# Patient Record
Sex: FEMALE | Race: WHITE | NOT HISPANIC OR LATINO | Employment: FULL TIME | URBAN - METROPOLITAN AREA
[De-identification: names, ages, dates, MRNs, and addresses within clinical notes are randomized per-mention and may not be internally consistent; named-entity substitution may affect disease eponyms.]

---
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
## 2018-01-10 NOTE — PROGRESS NOTES
Chief Complaint    1  Hand Problem    Post-Op  Post-Op Hand Finger [facility]:   Ana Rodriguez is status post Ganglion Cyst Excision of the left ring finger  History of Present Illness: The patient reports no nausea, no fever, no pain, no abnormal bleeding, no swelling, no numbness and no paresthesias  Physical Examination:   Surgical incision site is clean, dry and intact  The hand demonstrates no warmth, no erythema, no swelling, no induration, no ecchymosis and no tenderness  Evaluation of sensation distal to the operative site demonstrates sensation intact to light touch and motor function grossly intact  Capillary refill distal to the operative site is within normal limits (approx  2 seconds)  Assessment:   Post-op, the patient is doing well, with good pain control and without signs of an infection  Plan:     Done this visit: remove sutures/staples and steristrip wound  Medication(s): no medications were prescribed  Patient instructed to follow up if needed  Active Problems    1  Ankle Sprain (845 00)   2  Mass of hand, left (782 2) (R22 32)   3  Shoulder Sprain (840 9)   4  Tenosynovitis Of Ankle/Foot (727 06)   5  Wrist tendonitis (727 05) (M77 8)    Social History    · Daily Tea Consumption (___ Cups/Day)   · Never A Smoker   · Never Drank Alcohol    Current Meds   1  No Reported Medications Recorded    Allergies    1  No Known Drug Allergies    Future Appointments    Date/Time Provider Specialty Site   01/18/2016 08:40 AM SIOBHAN Castelan   Orthopedic [address] [facility]     Signatures   Electronically signed by : SIOBHAN Mathis ; Vijay 15 2016  6:48PM EST                       (Author)

## 2018-01-11 NOTE — PROGRESS NOTES
Assessment    1  Encounter for preventive health examination (V70 0) (Z00 00)   2  Encounter for PPD test (V74 1) (Z11 1)    Discussion/Summary    Urine screen done  rto prn  Chief Complaint  Annual PE w/ARC forms  ksd,cma      History of Present Illness  HM, 12-18 years Female (Brief): Ron Krishna presents today for routine health maintenance with her ARC PE  General Health:   Caregiver concerns:   Nutrition/Elimination:   Sleep:   Behavior:   Health Risks:   Childcare/School:   Sports Participation Questions:      Review of Systems    Constitutional: No complaints of fever or chills, feels well, no tiredness, no recent weight gain or loss  Eyes: No complaints of eye pain, no discharge, no eyesight problems, eyes do not itch, no red or dry eyes  ENT: no complaints of nasal discharge, no hoarseness, no earache, no nosebleeds, no loss of hearing, no sore throat  Cardiovascular: No complaints of chest pain, no palpitations, normal heart rate, no lower extremity edema  Respiratory: No complaints of cough, no shortness of breath, no wheezing, no leg claudication  Gastrointestinal: No complaints of abdominal pain, no nausea or vomiting, no constipation, no diarrhea or bloody stools  Genitourinary: No complaints of incontinence, no pelvic pain, no dysuria or dysmenorrhea, no abnormal vaginal bleeding or vaginal discharge  Musculoskeletal: No complaints of limb swelling or limb pain, no myalgias, no joint swelling or joint stiffness  Integumentary: No complaints of skin rash, no skin lesions or wounds, no itching, no breast pain, no breast lump  Neurological: No complaints of headache, no numbness or tingling, no confusion, no dizziness, no limb weakness, no convulsions or fainting, no difficulty walking  Psychiatric: No complaints of feeling depressed, no suicidal thoughts, no emotional problems, no anxiety, no sleep disturbances, no change in personality     Endocrine: No complaints of feeling weak, no muscle weakness, no deepening of voice, no hot flashes or proptosis  Hematologic/Lymphatic: No complaints of swollen glands, no neck swollen glands, does not bleed or bruise easily  ROS reported by the patient  Family History  Mother    · Family history of lupus anticoagulant disorder (V18 3) (Z83 2)  Father    · Family history of essential hypertension (V17 49) (Z82 49)  Paternal Grandmother    · Family history of Type 2 diabetes mellitus without complication, unspecified long term  insulin use status  Maternal Grandfather    · Family history of Type 2 diabetes mellitus without complication, unspecified long term  insulin use status  Family History    · Family history of Arthritis (V17 7)    Social History    · Daily Tea Consumption (___ Cups/Day)   · Never A Smoker   · Never Drank Alcohol    Current Meds   1  No Reported Medications Recorded    Allergies    1  No Known Drug Allergies    Vitals   Recorded: 55LYL2024 01:51LI   Systolic 606   Diastolic 80   Heart Rate 88   Respiration 16   Temperature 97 6 F   Height 5 ft 9 in   Weight 278 lb    BMI Calculated 41 05   BSA Calculated 2 38   BMI Percentile 99 %   2-20 Stature Percentile 97 %   2-20 Weight Percentile 99 %     Physical Exam    Constitutional - General appearance: No acute distress, well appearing and well nourished  morbidly obese  Head and Face - Head and face: Normocephalic, atraumatic  Eyes - Conjunctiva and lids: No injection, edema or discharge  Pupils and irises: Equal, round, reactive to light bilaterally  Ears, Nose, Mouth, and Throat - Otoscopic examination: Tympanic membranes gray, translucent with good bony landmarks and light reflex  Canals patent without erythema  Oropharynx: Moist mucosa, normal tongue and tonsils without lesions  Neck - Neck: Supple, symmetric, no masses  Pulmonary - Respiratory effort: Normal respiratory rate and rhythm, no increased work of breathing   Auscultation of lungs: Clear bilaterally  Cardiovascular - Auscultation of heart: Regular rate and rhythm, normal S1 and S2, no murmur  Examination of extremities for edema and/or varicosities: Normal    Abdomen - Abdomen: Normal bowel sounds, soft, non-tender, no masses  Musculoskeletal - Gait and station: Normal gait   Inspection/palpation of joints, bones, and muscles: Normal  Muscle strength/tone: Normal    Skin - Skin and subcutaneous tissue: Normal    Neurologic - Cranial nerves: Normal    Psychiatric - judgment and insight: Normal  Mood and affect: Normal       Signatures   Electronically signed by : SIOBHAN Winslow ; Oct 12 2016  2:23PM EST                       (Author)

## 2018-01-12 NOTE — PROGRESS NOTES
Assessment    1  Difficulty walking (719 7) (R26 2)   2  Plantar fibromatosis (728 71) (M72 2)   3  Congenital pes planus of left foot (754 61) (Q66 52)   4  Congenital pes planus of right foot (754 61) (Q66 51)    Plan    · Follow-up PRN Evaluation and Treatment  Follow-up  Status: Complete  Done:  84IVF5761 03:52PM   · We recommend that you stretch your plantar fascia using a step or stair  Do this exercise  10 times in a row, 3 times a day , and hold the stretch for 10 seconds each  time ; Status:Complete;   Done: 93ICN1267 03:52PM   · Orthotics B/L; Status:Complete;   Done: 39BQP6002 03:52PM    Discussion/Summary  Possible side effects of new medications were reviewed with the patient/guardian today  The treatment plan was reviewed with the patient/guardian  The patient/guardian understands and agrees with the treatment plan   The patient was counseled regarding instructions for management, patient and family education, importance of compliance with treatment  Chief Complaint  Feet and ankles have been painful  History of Present Illness  HPI: Asian complains of pain in her feet and arches with ambulation  Patient has no history of trauma      Review of Systems    Constitutional: No fever, no chills, feels well, no tiredness, no recent weight gain or loss  Eyes: No complaints of eyesight problems, no red eyes  ENT: no loss of hearing, no nosebleeds, no sore throat  Cardiovascular: No complaints of chest pain, no palpitations, no leg claudication or lower extremity edema  Respiratory: no compliants of shortness of breath, no wheezing, no cough  Gastrointestinal: no complaints of abdominal pain, no constipation, no nausea or diarrhea, no vomiting, no bloody stools  Genitourinary: no complaints of dysuria, no incontinence  Musculoskeletal: as noted in HPI  Integumentary: no complaints of skin rash or lesion, no itching or dry skin, no skin wounds     Neurological: no complaints of headache, no confusion, no numbness or tingling, no dizziness  Endocrine: No complaints of muscle weakness, no feelings of weakness, no frequent urination, no excessive thirst    Psychiatric: No suicidal thoughts, no anxiety, no feelings of depression  Active Problems    1  Ankle Sprain (845 00)   2  Mass of hand, left (782 2) (R22 32)   3  Shoulder Sprain (840 9)   4  Tenosynovitis Of Ankle/Foot (727 06)   5  Wrist tendonitis (727 05) (M77 8)    Past Medical History    The active problems and past medical history were reviewed and updated today  Surgical History    The surgical history was reviewed and updated today  Family History    · Family history of Arthritis (V17 7)    The family history was reviewed and updated today  Social History    · Daily Tea Consumption (___ Cups/Day)   · Never A Smoker   · Never Drank Alcohol  The social history was reviewed and updated today  Current Meds   1  No Reported Medications Recorded    The medication list was reviewed and updated today  Allergies    1  No Known Drug Allergies    Vitals   Recorded: 35XPP1692 03:20PM   Height 5 ft 10 in   2-20 Stature Percentile 99 %   Weight 232 lb    2-20 Weight Percentile 99 %   BMI Calculated 33 29   BMI Percentile 97 %   BSA Calculated 2 22     Physical Exam  Left Foot: Appearance: Normal except as noted: excessive pronation and pes planus  Tenderness: None except the medial longitudinal arch  Right Foot: Appearance: Normal except as noted: excessive pronation and pes planus  Tenderness: None except the medial longitudinal arch  Left Ankle: ROM: Full  Motor: Normal    Right Ankle: ROM: Full  Motor: Normal    Neurological Exam: performed  Light touch was intact bilaterally  Vibratory sensation was intact bilaterally  Response to monofilament test was intact bilaterally  Deep tendon reflexes: patellar reflex present bilaterally and achilles reflex present bilaterally     Vascular Exam: performed Dorsalis pedis pulses were present bilaterally  Posterior tibial pulses were present bilaterally  Elevation Pallor: absent bilaterally  Dependence rubor was absent bilaterally  Edema: none  Hyperkeratosis: present on both first toes  Shoe Gear Evaluation: performed ()  Recommendation(s): custom inlays and athletic shoes  Results/Data  Apparent limb length discrepancy noted  The right lower extremity is longer than the left  Patient will need an orthotic with a left heel lift      Procedure  Patient was examined  Patient was educated on her condition   Her feet were casted for custom molded foot orthotics      Signatures   Electronically signed by : Janel Pearson DPM; Jan 28 2016  3:52PM EST                       (Author)

## 2018-01-13 VITALS
SYSTOLIC BLOOD PRESSURE: 140 MMHG | HEIGHT: 69 IN | TEMPERATURE: 98.7 F | HEART RATE: 80 BPM | DIASTOLIC BLOOD PRESSURE: 80 MMHG | BODY MASS INDEX: 42.51 KG/M2 | WEIGHT: 287 LBS | RESPIRATION RATE: 16 BRPM

## 2018-01-15 NOTE — PROGRESS NOTES
Chief Complaint  pt here for ppd read  tc/cma      Active Problems    1  Ankle Sprain (845 00)   2  Body mass index (BMI) of 40 0 to 44 9 in adult (V85 41) (Z68 41)   3  Congenital pes planus of left foot (754 61) (Q66 52)   4  Congenital pes planus of right foot (754 61) (Q66 51)   5  Difficulty walking (719 7) (R26 2)   6  Elevated TSH (794 5) (R94 6)   7  Encounter for PPD test (V74 1) (Z11 1)   8  Mass of hand, left (782 2) (R22 32)   9  Obesity (278 00) (E66 9)   10  Pain in both feet (729 5) (M79 671,M79 672)   11  Plantar fibromatosis (728 71) (M72 2)   12  Shoulder Sprain (840 9)   13  Tenosynovitis Of Ankle/Foot (727 06)   14  Vitiligo (709 01) (L80)   15  Wrist tendonitis (727 05) (M77 8)    Current Meds   1  No Reported Medications Recorded    Allergies    1   No Known Drug Allergies    Signatures   Electronically signed by : SIOBHAN Goodrich ; Oct 14 2016  2:07PM EST                       (Author)

## 2018-09-28 ENCOUNTER — OFFICE VISIT (OUTPATIENT)
Dept: URGENT CARE | Facility: CLINIC | Age: 18
End: 2018-09-28
Payer: COMMERCIAL

## 2018-09-28 VITALS
HEIGHT: 70 IN | DIASTOLIC BLOOD PRESSURE: 80 MMHG | OXYGEN SATURATION: 100 % | BODY MASS INDEX: 39.94 KG/M2 | WEIGHT: 279 LBS | HEART RATE: 106 BPM | RESPIRATION RATE: 18 BRPM | SYSTOLIC BLOOD PRESSURE: 118 MMHG | TEMPERATURE: 98.4 F

## 2018-09-28 DIAGNOSIS — M54.9 ACUTE BILATERAL BACK PAIN, UNSPECIFIED BACK LOCATION: Primary | ICD-10-CM

## 2018-09-28 PROBLEM — R51.9 HEADACHE: Status: ACTIVE | Noted: 2017-01-31

## 2018-09-28 PROBLEM — S09.90XA RECENT HEAD TRAUMA: Status: ACTIVE | Noted: 2017-01-31

## 2018-09-28 PROCEDURE — 99213 OFFICE O/P EST LOW 20 MIN: CPT | Performed by: PHYSICIAN ASSISTANT

## 2018-09-28 RX ORDER — CYCLOBENZAPRINE HCL 10 MG
10 TABLET ORAL 3 TIMES DAILY PRN
Qty: 20 TABLET | Refills: 0 | Status: SHIPPED | OUTPATIENT
Start: 2018-09-28 | End: 2019-03-27 | Stop reason: SDUPTHER

## 2018-09-28 NOTE — PATIENT INSTRUCTIONS
Take muscle relaxant as directed  This medication will make you drowsy and should not be taken prior to performing activities that will require full attention  Do not take within 12 hours of driving  Begin ibuprofen as directed  Apply heat or ice to the area for 20-30 minutes at a time at least 3-4 times per day  Continue stretching at least twice daily  Follow up with an orthopedist in the next 3-5 days  Proceed to the ER if symptoms worsen

## 2018-10-01 ENCOUNTER — OFFICE VISIT (OUTPATIENT)
Dept: FAMILY MEDICINE CLINIC | Facility: CLINIC | Age: 18
End: 2018-10-01
Payer: COMMERCIAL

## 2018-10-01 VITALS
HEIGHT: 70 IN | WEIGHT: 276.2 LBS | SYSTOLIC BLOOD PRESSURE: 142 MMHG | BODY MASS INDEX: 39.54 KG/M2 | TEMPERATURE: 97.4 F | DIASTOLIC BLOOD PRESSURE: 80 MMHG | RESPIRATION RATE: 16 BRPM | HEART RATE: 100 BPM

## 2018-10-01 DIAGNOSIS — M25.552 LEFT HIP PAIN: Primary | ICD-10-CM

## 2018-10-01 DIAGNOSIS — M25.40 JOINT SWELLING: ICD-10-CM

## 2018-10-01 DIAGNOSIS — M54.50 LEFT-SIDED LOW BACK PAIN WITHOUT SCIATICA, UNSPECIFIED CHRONICITY: ICD-10-CM

## 2018-10-01 PROCEDURE — 99213 OFFICE O/P EST LOW 20 MIN: CPT | Performed by: FAMILY MEDICINE

## 2018-10-01 NOTE — PROGRESS NOTES
[address] [facility]        NAME: Kaleb Fuentes is a 19 y o  female  : 2000    MRN: [id number]  DATE: 2018  TIME: 3:30 PM    Assessment and Plan   Acute bilateral back pain, unspecified back location [M54 9]  1  Acute bilateral back pain, unspecified back location  cyclobenzaprine (FLEXERIL) 10 mg tablet     Patient Instructions   Take muscle relaxant as directed  This medication will make you drowsy and should not be taken prior to performing activities that will require full attention  Do not take within 12 hours of driving  Begin ibuprofen as directed  Apply heat or ice to the area for 20-30 minutes at a time at least 3-4 times per day  Continue stretching at least twice daily  Follow up with an orthopedist in the next 3-5 days  Proceed to the ER if symptoms worsen  Chief Complaint     Chief Complaint   Patient presents with    Back Pain     Pt here for back pain x 2 weeks pt states she has a hx of back  tightness,   pt states no injury  Pain   7/10  Pt used no meds,     History of Present Illness      64-year-old female presenting with complaint of back tightness x2 weeks  Patient notes that tightness initially 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  She notes she has had difficulty with her back becoming tight periodically over the past few years  She reportedly stretches twice daily, applies heat or sometimes sleeps on a heating pad, and has attempted to treat with ibuprofen and Tylenol  Pain is rated as a 7/10, is reportedly sharp, and is reportedly disrupting patient's sleep  She notes she has tried to use Benadryl and supplemental melatonin to aid in sleep but states these are not working  She notes primary problem is not falling asleep but rather staying asleep  She notes that movements in her sleep cause her to awaken out of discomfort and then she is not able to get back to sleep   She has reportedly seen a chiropractor in the past but is not currently following with this type of doctor  She denies any numbness, tingling, weakness, saddle anesthesia, or incontinence of bowel or bladder  No recent injury or trauma  Review of Systems   Review of Systems   Constitutional: Negative for appetite change, chills, diaphoresis, fatigue and fever  Respiratory: Negative for cough, chest tightness, shortness of breath and wheezing  Cardiovascular: Negative for chest pain and palpitations  Gastrointestinal: Negative for abdominal pain, diarrhea, nausea and vomiting  Musculoskeletal: Negative for arthralgias, gait problem, joint swelling, myalgias, neck pain and neck stiffness  Skin: Negative for color change, pallor, rash and wound  Neurological: Negative for dizziness, weakness, light-headedness, numbness and headaches  Current Medications       Current Outpatient Prescriptions:     cyclobenzaprine (FLEXERIL) 10 mg tablet, Take 1 tablet (10 mg total) by mouth 3 (three) times a day as needed for muscle spasms for up to 20 doses, Disp: 20 tablet, Rfl: 0    Current Allergies     Allergies as of 09/28/2018    (No Known Allergies)            The following portions of the patient's history were reviewed and updated as appropriate: allergies, current medications, past family history, past medical history, past social history, past surgical history and problem list      Past Medical History:   Diagnosis Date    Obesity 8/11/2016       Past Surgical History:   Procedure Laterality Date    HAND SURGERY      HAND SURGERY      WISDOM TOOTH EXTRACTION         Family History   Problem Relation Age of Onset    Hypertension Mother     Heart disease Mother     Heart disease Father     Hypertension Father      Medications have been verified      Objective   /80 (BP Location: Right arm, Patient Position: Sitting, Cuff Size: Large)   Pulse (!) 106   Temp 98 4 °F (36 9 °C) (Tympanic)   Resp 18   Ht 5' 10" (1 778 m)   Wt 127 kg (279 lb)   SpO2 100%   BMI 40 03 kg/m²      Physical Exam     Physical Exam   Constitutional: She is oriented to person, place, and time  She appears well-developed and well-nourished  No distress  HENT:   Head: Normocephalic and atraumatic  Eyes: Conjunctivae are normal    Cardiovascular: Normal rate, regular rhythm and normal heart sounds  Pulmonary/Chest: Effort normal and breath sounds normal  No respiratory distress  She has no decreased breath sounds  She has no wheezes  She has no rhonchi  She has no rales  Abdominal: Soft  Bowel sounds are normal  She exhibits no distension and no mass  There is no tenderness  There is no rebound and no guarding  Musculoskeletal:    No overlying skin abnormality or visible deformity of the back  No vertebral TTP  There is PSM TTP throughout the thoracic and lumbar areas  Tenderness is reportedly worse on the right  Increased PSM tone of b/l lumbar areas  No cervical paraspinal muscle TTP  FROM of the cervical and thoracic spine  Near complete ROM of the lumbar spine  Negative straight leg raise  UE and LE strength 5/5  Distal sensation intact  UE and LE reflexes +2, symmetrical    Neurological: She is alert and oriented to person, place, and time  No cranial nerve deficit  She exhibits normal muscle tone  Coordination normal    Skin: Skin is warm and dry  No rash noted  She is not diaphoretic  Psychiatric: She has a normal mood and affect  Her behavior is normal    Nursing note and vitals reviewed

## 2018-10-01 NOTE — PROGRESS NOTES
Charna Heimlich 2000 female MRN: [id number]    FAMILY PRACTICE ACUTE OFFICE VISIT  [facility] - [address]      ASSESSMENT/PLAN  Charna Heimlich is a 19 y o  female presents to the office for    Left hip pain  - pt at baseline has chronic pain, however this is an acute on chronic hip pain she feeling  Continue flexeril 10mg  (+) test for labral tear  Would encourage the patient to start PT; if symptoms worsen or don't improve, will obtain an MRI  -     Ambulatory referral to Physical Therapy; Future    Left-sided low back pain without sciatica, unspecified chronicity  - Didn't appreciate any limited ROM/ or bony tenderness  Patient is likely compensating 2/2 to the hip pain  -     Ambulatory referral to Physical Therapy; Future    Joint swelling:-     - Given mother that is (+) for cardiolipin; concern for underlining autoimmune disease given symptoms since 6years of age  KEELY Screen w/ Reflex to Titer/Pattern; Future  -     Sedimentation rate, automated; Future  -     Cardiolipin antibody; Future  -     Sedimentation rate, automated  -     Cardiolipin antibody        Disposition: RTC after results     No future appointments  SUBJECTIVE  CC: Follow-up (from [facility] for [facility] pain)      HPI:  Charna Heimlich is a 19 y o  female pmhx of plantar fibromatosis, obesity and chronic joint swelling who presents for a follow-up after being seen at [facility]  Patient was seen there for bilateral back pain however the patient explains that it is mostly on her left side  - Back pain x 2 weeks w/ left hip pain, feeling like twinging of the hip  Pain Sharp/ stabbing pain with sitting/to standing    - left foot numbness time to time  -patient states that she has chronic joint swelling of the left knee to the right knee at times since the age of 6years of age  She has chronic problems in always has been worked up for possible Lyme    She is an active  and currently has taken some time off given her pains have been worsening  Mother recently diagnosed with Cardiolite been positive and was placed on blood thinners  No other family history of lupus/autoimmune        Review of Systems   Constitutional: Negative for activity change, appetite change, chills, fatigue and fever  HENT: Negative for congestion  Eyes: Negative for visual disturbance  Respiratory: Negative for cough, chest tightness and shortness of breath  Cardiovascular: Negative for chest pain and leg swelling  Gastrointestinal: Negative for abdominal distention, abdominal pain, constipation, diarrhea, nausea and vomiting  Musculoskeletal: Positive for arthralgias, back pain, gait problem and joint swelling  Allergic/Immunologic: Negative for environmental allergies  Neurological: Negative for dizziness, light-headedness and headaches  All other systems reviewed and are negative        Historical Information   The patient history was reviewed as follows:  Past Medical History:   Diagnosis Date    Obesity 8/11/2016         Past Surgical History:   Procedure Laterality Date    HAND SURGERY      HAND SURGERY      WISDOM TOOTH EXTRACTION       Family History   Problem Relation Age of Onset    Hypertension Mother     Heart disease Mother     Heart disease Father     Hypertension Father       Social History   History   Alcohol Use No     History   Drug Use No     History   Smoking Status    Never Smoker   Smokeless Tobacco    Never Used       Medications:     Current Outpatient Prescriptions:     cyclobenzaprine (FLEXERIL) 10 mg tablet, Take 1 tablet (10 mg total) by mouth 3 (three) times a day as needed for muscle spasms for up to 20 doses, Disp: 20 tablet, Rfl: 0    No Known Allergies    OBJECTIVE  Vitals:   Vitals:    10/01/18 1017   BP: 142/80   BP Location: Left arm   Patient Position: Sitting   Cuff Size: Standard   Pulse: 100   Resp: 16   Temp: (!) 97 4 °F (36 3 °C) Weight: 125 kg (276 lb 3 2 oz)   Height: 5' 10" (1 778 m)         Physical Exam   Constitutional: She is oriented to person, place, and time  Vital signs are normal  She appears well-developed and well-nourished  HENT:   Head: Normocephalic and atraumatic  Right Ear: Hearing normal    Left Ear: Hearing normal    Eyes: Pupils are equal, round, and reactive to light  Conjunctivae and EOM are normal    Neck: Normal range of motion  Neck supple  Cardiovascular: Normal rate, regular rhythm, S1 normal, S2 normal, normal heart sounds and intact distal pulses  No murmur heard  Pulmonary/Chest: Effort normal and breath sounds normal  No respiratory distress  She has no wheezes  Musculoskeletal: She exhibits no edema  Left hip: She exhibits decreased range of motion and tenderness (In the groin area on adduction)  She exhibits no swelling  Lumbar back: Normal  She exhibits normal range of motion, no tenderness, no bony tenderness, no swelling, no edema, no pain and no spasm  Bilateral knees no swelling appreciated good range of motion  Neurological: She is alert and oriented to person, place, and time  She has normal strength  Skin: Skin is warm  No rash noted  Psychiatric: She has a normal mood and affect  Her speech is normal and behavior is normal  Judgment and thought content normal    Vitals reviewed                   Chandrakant Whitten MD,   [facility]  10/1/2018

## 2018-10-02 ENCOUNTER — EVALUATION (OUTPATIENT)
Dept: PHYSICAL THERAPY | Facility: CLINIC | Age: 18
End: 2018-10-02
Payer: COMMERCIAL

## 2018-10-02 DIAGNOSIS — M54.50 LEFT-SIDED LOW BACK PAIN WITHOUT SCIATICA, UNSPECIFIED CHRONICITY: ICD-10-CM

## 2018-10-02 DIAGNOSIS — M25.552 LEFT HIP PAIN: Primary | ICD-10-CM

## 2018-10-02 LAB
CARDIOLIPIN IGA SER IA-ACNC: <9 APL U/ML (ref 0–11)
CARDIOLIPIN IGG SER IA-ACNC: <9 GPL U/ML (ref 0–14)
CARDIOLIPIN IGM SER IA-ACNC: <9 MPL U/ML (ref 0–12)
ERYTHROCYTE [SEDIMENTATION RATE] IN BLOOD BY WESTERGREN METHOD: 16 MM/HR (ref 0–32)

## 2018-10-02 PROCEDURE — G8979 MOBILITY GOAL STATUS: HCPCS

## 2018-10-02 PROCEDURE — G8978 MOBILITY CURRENT STATUS: HCPCS

## 2018-10-02 PROCEDURE — 97161 PT EVAL LOW COMPLEX 20 MIN: CPT

## 2018-10-02 NOTE — PROGRESS NOTES
PT Evaluation     Today's date: 10/2/2018  Patient name: Tyrone Feldman  : 2000  MRN: [id number]  Referring provider: Shelby Pierce  Dx:   Encounter Diagnosis     ICD-10-CM    1  Left hip pain M25 552 Ambulatory referral to Physical Therapy   2  Left-sided low back pain without sciatica, unspecified chronicity M54 5 Ambulatory referral to Physical Therapy                  Assessment  Impairments: abnormal gait, abnormal or restricted ROM, abnormal movement, activity intolerance, impaired balance, impaired physical strength, lacks appropriate home exercise program, pain with function and poor body mechanics    Assessment details: Tyrone Feldman is a 19 y o  female who presents to physical therapy with pain, decreased LE range of motion, decreased LE strength, fair balance, impaired function and decreased activity tolerance  Patient's clinical presentation is consistent with their referring diagnosis of Left hip pain  (primary encounter diagnosis), and Left-sided low back pain without sciatica, unspecified chronicity  The pt presents with functional limitations of ADLs, recreational activities and work-related activities  Pt would benefit from physical therapy services to address these limitations and maximize function  Pt was instructed and educated on home exercise program and good sitting posture today and demonstrates understanding     Understanding of Dx/Px/POC: good   Prognosis: good    Goals  Short Term Goals (4 weeks)  Pt will be independent in basic Home Exercise Program  Pt will demonstrate good hip hinge and squat form  Pt will demonstrate improved left hip ROM by 10 degrees in all directions  Pt will be able to perform ADLs with pain no more than 3/10    Long Term Goals (6-8 weeks)  Pt will be independent in comprehensive Home Exercise Program  Pt will demonstrate improved FOTO status score by 10 points  Pt will demonstrate improved hip MMT strength to at least 4+/5  Pt will be able to return to gym routine with minimal to no limitations    Plan  Patient would benefit from: skilled PT  Referral necessary: No  Planned modality interventions: cryotherapy and thermotherapy: hydrocollator packs  Planned therapy interventions: ADL training, activity modification, balance, body mechanics training, flexibility, functional ROM exercises, gait training, graded exercise, home exercise program, transfer training, therapeutic exercise, therapeutic activities, stretching, strengthening, postural training, patient education, neuromuscular re-education, manual therapy and joint mobilization  Frequency: 2x week  Duration in weeks: 8  Treatment plan discussed with: patient        Subjective Evaluation    History of Present Illness  Mechanism of injury: Pt reports that she has been suffering from left hip and left sided LBP for the past couple of weeks  States she had an acute flare up in her low back last week and had to go to urgent care  Pt was given a script for Flexeril and instructed to follow up with PCP  She saw her PCP yesterday and was referred to PT  No imaging taken  Pt reports her pain will increase with prolonged sitting, getting up after prolonged rest, standing for several hours, lifting heavy objects, and is waking up several times a night with pain  Pt reports that her back will twinge occasionally and feels like a muscle spasm  Occasionally radiates to lateral hip and down to knee  States that her left foot can go numb after 30 min on a cardio machine     Not a recurrent problem   Pain  Current pain ratin  At best pain ratin  At worst pain ratin  Location: left hip  Quality: sharp, radiating, tight, dull ache and discomfort  Relieving factors: medications, ice and heat  Aggravating factors: standing, walking, sitting and lifting  Progression: no change    Social Support    Employment status: working (Full time student- internship social work major, and part time at [employer])  Exercise history: everyday, decreased to 1-2 times      Diagnostic Tests  No diagnostic tests performed  Treatments  Previous treatment: medication  Patient Goals  Patient goals for therapy: decreased pain and return to sport/leisure activities          Objective     Special Questions  Negative for bladder dysfunction and bowel dysfunction    Palpation   Left   No palpable tenderness to the iliopsoas  Hypertonic in the quadratus lumborum  Tenderness of the gluteus medius, lumbar paraspinals, quadratus lumborum and TFL  Right   No palpable tenderness to the iliopsoas  Tenderness     Lumbar Spine  No tenderness in the spinous process  Left Hip   Tenderness in the PSIS  Right Hip   No tenderness in the PSIS  Neurological Testing     Sensation     Lumbar   Left   Intact: light touch    Right   Intact: light touch    Active Range of Motion     Lumbar   Flexion: 90 (%) degrees   Extension: 100 (%) degrees   Left lateral flexion: 90 (%) degrees   Right lateral flexion: 90 (%) degrees   Left rotation: 90 (%) degrees   Right rotation: 90 (%) degrees   Left Hip   Flexion: 93 degrees   External rotation (90/90): 40 degrees with pain  Internal rotation (90/90): 40 degrees with pain    Right Hip   Flexion: 100 degrees   External rotation (90/90): 45 degrees   Internal rotation (90/90): 50 degrees     Additional Active Range of Motion Details  "Pulling" reported with end range flexion  Unable to reproduce radicular symptoms with repeated motions    Strength/Myotome Testing     Left Hip   Planes of Motion   Flexion: 4-  Extension: 4  Abduction: 4  Adduction: 4    Right Hip   Planes of Motion   Flexion: 4+  Extension: 4  Abduction: 4+  Adduction: 4+    Left Knee   Extension: 4+    Right Knee   Extension: 5    Left Ankle/Foot   Dorsiflexion: 5    Muscle Activation   Patient unable to activate left transverse abdominals and right transverse abdominals  Tests       Thoracic   Negative slump  Lumbar   Negative repeated flexion, repeated extension and slump  Left   Negative passive SLR  Right   Negative passive SLR  Left Hip   Positive GEORGE and scour  Negative Ely's and sign of the buttock  Modified Kota: Positive  Additional Tests Details  Flexibility: min/ mod tightness in bilateral hamstrings    Ambulation     Ambulation: Stairs   Ascend stairs: independent  Pattern: reciprocal  Railings: without rails  Descend stairs: independent  Pattern: reciprocal  Railings: without rails  Curbs: independent    Observational Gait   Decreased walking speed, left stance time and right step length  Left foot contact pattern: heel to toe  Right foot contact pattern: heel to toe    Quality of Movement During Gait     Knee    Knee (Left): Positive valgus  Knee (Right): Positive valgus  Ankle    Ankle (Left): Positive pronated  Ankle (Right): Positive pronated  Functional Assessment   Squat   Pain, left tibial anterior translation beyond toes and right tibial anterior translation beyond toes  Forward Step Up 6"   Left Leg  Valgus  Right Leg  Valgus  Single Leg Stance   Left: 30 (decreased ankle stability) seconds  Right: 30 seconds      Flowsheet Rows      Most Recent Value   PT/OT G-Codes   Current Score  56   Projected Score  71   FOTO information reviewed  Yes   Assessment Type  Evaluation   G code set  Mobility: Walking & Moving Around   Mobility: Walking and Moving Around Current Status ()  CK   Mobility: Walking and Moving Around Goal Status ()  CJ          Pt was given initial Home Exercise Program today and demonstrates understanding   Clickslide access code: [facility]    Precautions standard    Specialty Daily Treatment Diary     Manual         PROM hip        Hamstring stretch        STM                            Exercise Diary         Rec bike        clamshells        Ball squeeze        Hip abduction        bridges        Hip hinge        Heel slides Modalities        CP/MHP

## 2018-10-04 ENCOUNTER — OFFICE VISIT (OUTPATIENT)
Dept: PHYSICAL THERAPY | Facility: CLINIC | Age: 18
End: 2018-10-04
Payer: COMMERCIAL

## 2018-10-04 DIAGNOSIS — M25.552 LEFT HIP PAIN: Primary | ICD-10-CM

## 2018-10-04 DIAGNOSIS — M54.50 LEFT-SIDED LOW BACK PAIN WITHOUT SCIATICA, UNSPECIFIED CHRONICITY: ICD-10-CM

## 2018-10-04 PROCEDURE — 97110 THERAPEUTIC EXERCISES: CPT

## 2018-10-04 PROCEDURE — 97140 MANUAL THERAPY 1/> REGIONS: CPT

## 2018-10-04 NOTE — PROGRESS NOTES
Daily Note     Today's date: 10/4/2018  Patient name: Roberto Seth  : 2000  MRN: [id number]  Referring provider: Carlos Grant*  Dx:   Encounter Diagnoses   Name Primary?  Left hip pain Yes    Left-sided low back pain without sciatica, unspecified chronicity                   Subjective: Pt reports 6/10 in left knee today  States that she was at work last night and was leaning against a stool and started getting shooting pain in lateral thigh down to knee  She was unable to change symptoms with a change in position  Objective: See treatment diary below    Precautions standard    Specialty Daily Treatment Diary     Manual  10/4       PROM hip All directions       Hamstring stretch 30 sec, 1x3       STM Left QL and glute med, IASTM left quad and ITB       Hip distraction Lateral and long, gr III,IV                   Exercise Diary         Upright bike 5 min, lv 3       clamshells Black, Hold 5, 2x10        Ball squeeze Hold 5, 2x10        Hip abduction Hold 5, 3x5        bridges        HS stretch with strap  30 sec, 1x3 left       Heel slides        Dead bugs                                                                    Modalities        CP/MHP                            Assessment: Pt tolerated treatment well with minimal complaints of pain  Reviewed initial HEP with patient and demonstrates independence  Pt presents with tenderness upon palpation of left QL and glute med, decreased with STM and accupressure  Plan: Continue with plan of care to decrease pain, improve mobility, strength, and function  Progress treatment as tolerated

## 2018-10-08 ENCOUNTER — OFFICE VISIT (OUTPATIENT)
Dept: PHYSICAL THERAPY | Facility: CLINIC | Age: 18
End: 2018-10-08
Payer: COMMERCIAL

## 2018-10-08 DIAGNOSIS — M54.50 LEFT-SIDED LOW BACK PAIN WITHOUT SCIATICA, UNSPECIFIED CHRONICITY: ICD-10-CM

## 2018-10-08 DIAGNOSIS — M25.552 LEFT HIP PAIN: Primary | ICD-10-CM

## 2018-10-08 PROCEDURE — 97140 MANUAL THERAPY 1/> REGIONS: CPT

## 2018-10-08 PROCEDURE — 97110 THERAPEUTIC EXERCISES: CPT

## 2018-10-08 NOTE — PROGRESS NOTES
Daily Note     Today's date: 10/8/2018  Patient name: Torrey Rashid  : 2000  MRN: [id number]  Referring provider: Ariela Varner*  Dx:   Encounter Diagnoses   Name Primary?  Left hip pain Yes    Left-sided low back pain without sciatica, unspecified chronicity                   Subjective: Pt reports she has noticed she has been getting more frequent tingling in her left foot with sitting for about 15 min  Pain currently 2/10 in left side of low back  Objective: See treatment diary below    Precautions standard    Specialty Daily Treatment Diary     Manual  10/4 10/8      PROM hip All directions All directions      Hamstring stretch 30 sec, 1x3 30 sec, 1x3      STM Left QL and glute med, IASTM left quad and ITB Left QL and glute med, accupressure left piriformis      Hip distraction Lateral and long, gr III,IV                   Exercise Diary         Upright bike 5 min, lv 3 5 min, lv 3      clamshells Black, Hold 5, 2x10  Black, Hold 5, 2x10       Ball squeeze Hold 5, 2x10  Hold 5, 2x10      Hip abduction Hold 5, 3x5        bridges        HS stretch with strap  30 sec, 1x3 left 30 sec, 1x3 left      Heel slides  Hold 5, 2x10       Dead bugs  2x10      Left piriformis stretch  Hold 30, 1x2                                                          Modalities        CP/MHP                            Assessment: Pt taken through repeated motions in attempts to bring on radicular symptoms but unable to reproduce  Pt with significant TTP a left piriformis  Pt was instructed and educated on self muscular release for piriformis and given piriformis stretch for HEP, demonstrates understanding  Plan: Continue with plan of care to decrease pain, improve mobility, strength, and function  Progress treatment as tolerated

## 2018-10-11 ENCOUNTER — OFFICE VISIT (OUTPATIENT)
Dept: PHYSICAL THERAPY | Facility: CLINIC | Age: 18
End: 2018-10-11
Payer: COMMERCIAL

## 2018-10-11 DIAGNOSIS — M54.50 LEFT-SIDED LOW BACK PAIN WITHOUT SCIATICA, UNSPECIFIED CHRONICITY: ICD-10-CM

## 2018-10-11 DIAGNOSIS — M25.552 LEFT HIP PAIN: Primary | ICD-10-CM

## 2018-10-11 PROCEDURE — 97110 THERAPEUTIC EXERCISES: CPT

## 2018-10-11 PROCEDURE — 97140 MANUAL THERAPY 1/> REGIONS: CPT

## 2018-10-11 NOTE — PROGRESS NOTES
Daily Note     Today's date: 10/11/2018  Patient name: Jina Mendiola  : 2000  MRN: [id number]  Referring provider: Michelle Lopez*  Dx:   Encounter Diagnosis     ICD-10-CM    1  Left hip pain M25 552    2  Left-sided low back pain without sciatica, unspecified chronicity M54 5                   Subjective: Patient reports that she still gets the numbness in the L leg intermittently but it is not quite as often as when she was first evaluated by PT  No numbness on arrival to session today  Dull, achey pain in L groin, rated at 6/10  Objective: See treatment diary below  Precautions standard    Specialty Daily Treatment Diary     Manual  10/4 10/8 10/11     PROM hip All directions All directions All directions     Hamstring stretch 30 sec, 1x3 30 sec, 1x3 30 sec, 1x3     STM Left QL and glute med, IASTM left quad and ITB Left QL and glute med, accupressure left piriformis Left QL and glute med, accupressure left piriformis     Hip distraction Lateral and long, gr III,IV                   Exercise Diary         Upright bike 5 min, lv 3 5 min, lv 3 5 min, lv 5     clamshells Black, Hold 5, 2x10  Black, Hold 5, 2x10  Black, Hold 5, 2x10      Ball squeeze Hold 5, 2x10  Hold 5, 2x10 Hold 5, 2x10     Hip abduction Hold 5, 3x5        bridges   5"x20     HS stretch with strap  30 sec, 1x3 left 30 sec, 1x3 left 30 sec, 1x3 left     Heel slides  Hold 5, 2x10  Hold 5, 2x10     Dead bugs  2x10 2x10     Left piriformis stretch  Hold 30, 1x2 Hold 30, 1x2     Thoracic ext, thoracic rotation stretches   On chair for ext,                                                  Modalities        CP/MHP                      Assessment: Tolerated treatment well and without distress  She demo's weakness with hip external rotation as compared to her internal rotation and TTP over her L TFL    Encouraged her to continue with core stabilization and L hip strengthening exercises regularly for her HEP in addition to adding the thoracic extension  She verbalizes understanding  Patient demonstrated fatigue post treatment, exhibited good technique with therapeutic exercises and would benefit from continued PT in this setting  Plan: Continue per plan of care

## 2018-10-15 ENCOUNTER — OFFICE VISIT (OUTPATIENT)
Dept: PHYSICAL THERAPY | Facility: CLINIC | Age: 18
End: 2018-10-15
Payer: COMMERCIAL

## 2018-10-15 DIAGNOSIS — M25.552 LEFT HIP PAIN: Primary | ICD-10-CM

## 2018-10-15 DIAGNOSIS — M54.50 LEFT-SIDED LOW BACK PAIN WITHOUT SCIATICA, UNSPECIFIED CHRONICITY: ICD-10-CM

## 2018-10-15 PROCEDURE — 97110 THERAPEUTIC EXERCISES: CPT

## 2018-10-15 PROCEDURE — 97140 MANUAL THERAPY 1/> REGIONS: CPT

## 2018-10-15 NOTE — PROGRESS NOTES
Daily Note     Today's date: 10/15/2018  Patient name: Vanita Puentes  : 2000  MRN: [id number]  Referring provider: Joselyn Montoya*  Dx:   Encounter Diagnoses   Name Primary?  Left hip pain Yes    Left-sided low back pain without sciatica, unspecified chronicity                   Subjective: Pt reports 7/10 anterior hip pain  States that she gets an increase in pain in anterior hip with getting up from prolonged sitting  Reports she has walk with her hip in IR to relieve the pain  Objective: See treatment diary below    Precautions standard    Specialty Daily Treatment Diary     Manual  10/4 10/8 10/11 10/15    PROM hip All directions All directions All directions All directions    Hamstring stretch 30 sec, 1x3 30 sec, 1x3 30 sec, 1x3 30 sec, 1x3    STM Left QL and glute med, IASTM left quad and ITB Left QL and glute med, accupressure left piriformis Left QL and glute med, accupressure left piriformis Left TFL and glute med    Hip distraction Lateral and long, gr III,IV   Lateral and long, gr III,IV                Exercise Diary         Upright bike 5 min, lv 3 5 min, lv 3 5 min, lv 5 5 min, lv 5    clamshells Black, Hold 5, 2x10  Black, Hold 5, 2x10  Black, Hold 5, 2x10  Black, Hold 5, 2x10    Ball squeeze Hold 5, 2x10  Hold 5, 2x10 Hold 5, 2x10 Hold 5, 2x10    Hip abduction Hold 5, 3x5         bridges   5"x20 5"x20    HS stretch with strap  30 sec, 1x3 left 30 sec, 1x3 left 30 sec, 1x3 left 30 sec, 1x3    Heel slides  Hold 5, 2x10  Hold 5, 2x10 Hold 5, 2x10    Dead bugs  2x10 2x10 2x10    Left piriformis stretch  Hold 30, 1x2 Hold 30, 1x2     Thoracic ext, thoracic rotation stretches   On chair for ext,  On chair for ext,     Bent knee fall outs    Hold 5, 2x10                                         Modalities        CP/MHP                    Assessment: Pt demonstrating mechanical symptoms in hip   End range pain into flexion and ER  (+) GEORGE test  Pt easily fatigue with glute strengthening activities  Plan: Continue with plan of care to decrease pain, improve mobility, strength, and function  Progress treatment as tolerated

## 2018-10-18 ENCOUNTER — OFFICE VISIT (OUTPATIENT)
Dept: PHYSICAL THERAPY | Facility: CLINIC | Age: 18
End: 2018-10-18
Payer: COMMERCIAL

## 2018-10-18 DIAGNOSIS — M54.50 LEFT-SIDED LOW BACK PAIN WITHOUT SCIATICA, UNSPECIFIED CHRONICITY: ICD-10-CM

## 2018-10-18 DIAGNOSIS — M25.552 LEFT HIP PAIN: Primary | ICD-10-CM

## 2018-10-18 PROCEDURE — 97140 MANUAL THERAPY 1/> REGIONS: CPT

## 2018-10-18 PROCEDURE — 97110 THERAPEUTIC EXERCISES: CPT

## 2018-10-18 NOTE — PROGRESS NOTES
Daily Note     Today's date: 10/18/2018  Patient name: Tyrone Feldman  : 2000  MRN: [id number]  Referring provider: Paty Pierce Child*  Dx:   Encounter Diagnoses   Name Primary?  Left hip pain Yes    Left-sided low back pain without sciatica, unspecified chronicity                   Subjective: Pt reports 6-7/10 anterior hip pain today  Reports her pain isnt decreasing but she has noticed an increase in mobility and strength  States she is taking a leave of absence at school to focus on getting better         Objective: See treatment diary below    Precautions standard    Specialty Daily Treatment Diary     Manual  10/4 10/8 10/11 10/15 10/18   PROM hip All directions All directions All directions All directions All directions   Hamstring stretch 30 sec, 1x3 30 sec, 1x3 30 sec, 1x3 30 sec, 1x3 30 sec, 1x3   STM Left QL and glute med, IASTM left quad and ITB Left QL and glute med, accupressure left piriformis Left QL and glute med, accupressure left piriformis Left TFL and glute med Left TFL and glute med   Hip distraction Lateral and long, gr III,IV   Lateral and long, gr III,IV Lateral and long, gr III,IV               Exercise Diary         Upright bike 5 min, lv 3 5 min, lv 3 5 min, lv 5 5 min, lv 5 5 min, lv 5   clamshells Black, Hold 5, 2x10  Black, Hold 5, 2x10  Black, Hold 5, 2x10  Black, Hold 5, 2x10 Black, Hold 5, 2x10   Ball squeeze Hold 5, 2x10  Hold 5, 2x10 Hold 5, 2x10 Hold 5, 2x10 Hold 5, 2x10   Hip abduction Hold 5, 3x5         bridges   5"x20 5"x20 5"x20   HS stretch with strap  30 sec, 1x3 left 30 sec, 1x3 left 30 sec, 1x3 left 30 sec, 1x3 30 sec, 1x3   Heel slides  Hold 5, 2x10  Hold 5, 2x10 Hold 5, 2x10 Hold 5, 2x10   Dead bugs  2x10 2x10 2x10 2x10   Left piriformis stretch  Hold 30, 1x2 Hold 30, 1x2     Thoracic ext, thoracic rotation stretches   On chair for ext,  On chair for ext,     Bent knee fall outs    Hold 5, 2x10  Hold 5, 2x10 Modalities        CP/MHP                    Assessment: Pt tolerated treatment well with no complaints of pain  Pt demonstrating improved hip PROM into ER and flexion  Continued tightness in hamstrings  Cueing given for TA activation with core strengthening  Plan: Continue with plan of care to decrease pain, improve mobility, strength, and function  Progress treatment as tolerated

## 2018-10-22 ENCOUNTER — APPOINTMENT (OUTPATIENT)
Dept: PHYSICAL THERAPY | Facility: CLINIC | Age: 18
End: 2018-10-22
Payer: COMMERCIAL

## 2018-10-23 ENCOUNTER — OFFICE VISIT (OUTPATIENT)
Dept: PHYSICAL THERAPY | Facility: CLINIC | Age: 18
End: 2018-10-23
Payer: COMMERCIAL

## 2018-10-23 DIAGNOSIS — M25.552 LEFT HIP PAIN: Primary | ICD-10-CM

## 2018-10-23 DIAGNOSIS — M54.50 LEFT-SIDED LOW BACK PAIN WITHOUT SCIATICA, UNSPECIFIED CHRONICITY: ICD-10-CM

## 2018-10-23 PROCEDURE — 97140 MANUAL THERAPY 1/> REGIONS: CPT

## 2018-10-23 PROCEDURE — 97112 NEUROMUSCULAR REEDUCATION: CPT

## 2018-10-23 PROCEDURE — 97110 THERAPEUTIC EXERCISES: CPT

## 2018-10-23 NOTE — PROGRESS NOTES
Daily Note     Today's date: 10/23/2018  Patient name: Meg Rogers  : 2000  MRN: [id number]  Referring provider: Ashley Valiente  Dx:   Encounter Diagnoses   Name Primary?  Left hip pain Yes    Left-sided low back pain without sciatica, unspecified chronicity                   Subjective: Pt reports she feels the same  Pain is currently rated 4/10  C/o continued difficulties getting up after prolonged sitting  Objective: See treatment diary below    Precautions standard    Specialty Daily Treatment Diary     Manual  10/23  10/11 10/15 10/18   PROM hip All directions  All directions All directions All directions   Hamstring and quad stretch 30 sec, 1x3  30 sec, 1x3 30 sec, 1x3 30 sec, 1x3   STM   Left QL and glute med, accupressure left piriformis Left TFL and glute med Left TFL and glute med   Hip distraction Lateral and long, gr III,IV   Lateral and long, gr III,IV Lateral and long, gr III,IV               Exercise Diary         Upright bike 5 min, lv 8  5 min, lv 5 5 min, lv 5 5 min, lv 5   clamshells Black, Hold 5, 2x10  Black, Hold 5, 2x10  Black, Hold 5, 2x10 Black, Hold 5, 2x10   Ball squeeze Hold 5, 2x10   Hold 5, 2x10 Hold 5, 2x10 Hold 5, 2x10   Hip abduction Sidelying, Hold 5, 3x5 bilateral         bridges Hold 5, 2x10   5"x20 5"x20 5"x20   HS stretch with strap  30 sec, 1x3 left  30 sec, 1x3 left 30 sec, 1x3 30 sec, 1x3   Heel slides   Hold 5, 2x10 Hold 5, 2x10 Hold 5, 2x10   Dead bugs 2x10  2x10 2x10 2x10   Left piriformis stretch   Hold 30, 1x2     Thoracic ext, thoracic rotation stretches   On chair for ext,  On chair for ext,     Bent knee fall outs Hold 5, 2x10   Hold 5, 2x10  Hold 5, 2x10    Squats off low mat 2x10       Side stepping Red, 2x10                           Modalities        CP/MHP                    Assessment: Pt tolerated treatment well with minimal complaints of pain  Pt was educated on proper squatting mechanics   Pt is demonstrating improved hip mobility and strength but continues to c/o continued significant pain with functional activities and ADLs  Recommended pt consult orthopedist secondary to minimal relief of symptoms and pt in agreement  Plan: Continue with plan of care to decrease pain, improve mobility, strength, and function  Progress treatment as tolerated

## 2018-10-25 ENCOUNTER — OFFICE VISIT (OUTPATIENT)
Dept: PHYSICAL THERAPY | Facility: CLINIC | Age: 18
End: 2018-10-25
Payer: COMMERCIAL

## 2018-10-25 DIAGNOSIS — M54.50 LEFT-SIDED LOW BACK PAIN WITHOUT SCIATICA, UNSPECIFIED CHRONICITY: ICD-10-CM

## 2018-10-25 DIAGNOSIS — M25.552 LEFT HIP PAIN: Primary | ICD-10-CM

## 2018-10-25 PROCEDURE — 97110 THERAPEUTIC EXERCISES: CPT

## 2018-10-25 PROCEDURE — 97112 NEUROMUSCULAR REEDUCATION: CPT

## 2018-10-25 PROCEDURE — 97140 MANUAL THERAPY 1/> REGIONS: CPT

## 2018-10-25 NOTE — PROGRESS NOTES
Daily Note     Today's date: 10/25/2018  Patient name: Wolf Ribeiro  : 2000  MRN: [id number]  Referring provider: Kamar Zhao*  Dx:   Encounter Diagnoses   Name Primary?  Left hip pain Yes    Left-sided low back pain without sciatica, unspecified chronicity                   Subjective: Pt reports her lateral left knee started bothering her from walking in hip IR as a compensation for pain  Pain currently 3/10, more tightness in anterior thigh  Objective: See treatment diary below    Precautions standard    Specialty Daily Treatment Diary     Manual  10/23 10/25  10/15 10/18   PROM hip All directions All directions  All directions All directions   Hamstring and quad stretch 30 sec, 1x3 30 sec, 1x3  30 sec, 1x3 30 sec, 1x3   STM  Left TFL and IASTM along ITB  Left TFL and glute med Left TFL and glute med   Hip distraction Lateral and long, gr III,IV   Lateral and long, gr III,IV Lateral and long, gr III,IV               Exercise Diary         Upright bike 5 min, lv 8 5 min, lv 8  5 min, lv 5 5 min, lv 5   clamshells Black, Hold 5, 2x10 Black, Hold 5, 2x10  Black, Hold 5, 2x10 Black, Hold 5, 2x10   Ball squeeze Hold 5, 2x10  Hold 5, 2x10   Hold 5, 2x10 Hold 5, 2x10   Hip abduction Sidelying, Hold 5, 3x5 bilateral  Sidelying, Hold 5, 3x5 bilatera       bridges Hold 5, 2x10  SL Hold 5, 2x10   5"x20 5"x20   HS stretch with strap  30 sec, 1x3 left 30 sec, 1x3 left  30 sec, 1x3 30 sec, 1x3   Heel slides    Hold 5, 2x10 Hold 5, 2x10   Dead bugs 2x10 2x10 with UE  2x10 2x10   Thoracic ext, thoracic rotation stretches    On chair for ext,     Bent knee fall outs Hold 5, 2x10 Hold 5, 2x10  Hold 5, 2x10  Hold 5, 2x10    Squats off low mat 2x10 2x10      Side stepping Red, 2x10 Red, 2x10                          Modalities        CP/MHP                    Assessment: Pt tolerated treatment well with minimal complaints of pain  Moderate TTP at left TFL, improved with STM   Pt given cueing for hip hinge with squatting and to minimize dynamic knee valgus  Plan: Continue with plan of care to decrease pain, improve mobility, strength, and function

## 2018-10-29 ENCOUNTER — OFFICE VISIT (OUTPATIENT)
Dept: PHYSICAL THERAPY | Facility: CLINIC | Age: 18
End: 2018-10-29
Payer: COMMERCIAL

## 2018-10-29 DIAGNOSIS — M25.552 LEFT HIP PAIN: Primary | ICD-10-CM

## 2018-10-29 DIAGNOSIS — M54.50 LEFT-SIDED LOW BACK PAIN WITHOUT SCIATICA, UNSPECIFIED CHRONICITY: ICD-10-CM

## 2018-10-29 PROCEDURE — 97140 MANUAL THERAPY 1/> REGIONS: CPT

## 2018-10-29 PROCEDURE — 97112 NEUROMUSCULAR REEDUCATION: CPT

## 2018-10-29 PROCEDURE — 97110 THERAPEUTIC EXERCISES: CPT

## 2018-10-29 NOTE — PROGRESS NOTES
Daily Note     Today's date: 10/29/2018  Patient name: Telly Martin  : 2000  MRN: [id number]  Referring provider: Teresa Zamorano*  Dx:   Encounter Diagnoses   Name Primary?  Left hip pain Yes    Left-sided low back pain without sciatica, unspecified chronicity                   Subjective: Pt reports 5-6/10 anterior knee pain  States she continues to ambulate with her hip in IR for relief  Objective: See treatment diary below    Precautions standard    Specialty Daily Treatment Diary     Manual  10/23 10/25 10/29  10/18   PROM hip All directions All directions All directions  All directions   Hamstring and quad stretch 30 sec, 1x3 30 sec, 1x3 30 sec, 1x3  30 sec, 1x3   STM  Left TFL and IASTM along ITB Left TFL  Left TFL and glute med   Hip distraction Lateral and long, gr III,IV    Lateral and long, gr III,IV               Exercise Diary         Upright bike 5 min, lv 8 5 min, lv 8 5 min, lv 6  5 min, lv 5   clamshells Black, Hold 5, 2x10 Black, Hold 5, 2x10 Black, Hold 5, 2x10  Black, Hold 5, 2x10   Ball squeeze Hold 5, 2x10  Hold 5, 2x10  Hold 5, 2x10   Hold 5, 2x10   Hip abduction Sidelying, Hold 5, 3x5 bilateral  Sidelying, Hold 5, 3x5 bilatera Sidelying, Hold 5, 3x5 bilateral     bridges Hold 5, 2x10  SL Hold 5, 2x10  SL Hold 5, 2x10   5"x20   HS stretch with strap  30 sec, 1x3 left 30 sec, 1x3 left 30 sec, 1x3 left  30 sec, 1x3   Heel slides     Hold 5, 2x10   Dead bugs 2x10 2x10 with UE 2x10 with UE  2x10   Thoracic ext, thoracic rotation stretches        Bent knee fall outs Hold 5, 2x10 Hold 5, 2x10 Hold 5, 2x10  Hold 5, 2x10    Squats off low mat 2x10 2x10 2x10     Side stepping Red, 2x10 Red, 2x10 Green, 2x10                         Modalities        CP/MHP                    Assessment: Pt tolerated treatment well with minimal complaints of pain  Pt continues to present with complaints of anterior groin pain with extremes of flexion and ER   Progressing well with hip stability and strength  Plan: Continue with plan of care to decrease pain, improve mobility, strength, and function  Progress treatment as tolerated

## 2018-10-30 ENCOUNTER — APPOINTMENT (OUTPATIENT)
Dept: RADIOLOGY | Facility: CLINIC | Age: 18
End: 2018-10-30
Payer: COMMERCIAL

## 2018-10-30 ENCOUNTER — OFFICE VISIT (OUTPATIENT)
Dept: OBGYN CLINIC | Facility: CLINIC | Age: 18
End: 2018-10-30
Payer: COMMERCIAL

## 2018-10-30 VITALS
SYSTOLIC BLOOD PRESSURE: 129 MMHG | BODY MASS INDEX: 39.45 KG/M2 | WEIGHT: 281.8 LBS | HEIGHT: 71 IN | HEART RATE: 114 BPM | DIASTOLIC BLOOD PRESSURE: 83 MMHG

## 2018-10-30 DIAGNOSIS — M25.552 PAIN IN LEFT HIP: Primary | ICD-10-CM

## 2018-10-30 DIAGNOSIS — M25.552 PAIN IN LEFT HIP: ICD-10-CM

## 2018-10-30 PROCEDURE — 99243 OFF/OP CNSLTJ NEW/EST LOW 30: CPT | Performed by: ORTHOPAEDIC SURGERY

## 2018-10-30 PROCEDURE — 73502 X-RAY EXAM HIP UNI 2-3 VIEWS: CPT

## 2018-10-30 NOTE — PROGRESS NOTES
Assessment/Plan:  1  Pain in left hip  XR hip/pelv 2-3 vws left if performed    MRI arthrogram left hip    FL injection left hip (arthrogram)       Scribe Attestation    I,:   Zach Alegria am acting as a scribe while in the presence of the attending physician :        I,:   Cheryle Jennings MD personally performed the services described in this documentation    as scribed in my presence :              Gerardo Renetta upon examination and review of x-ray of her left hip today demonstrates signs and symptoms that give me concern for possible tear of the labrum  I did note to her that this is a common occurrence in softball pitchers  Additionally she has completed a month worth of physical therapy with lisa Faust a fletcher to her knee symptoms  I would like to order an MRI with arthrogram of the left hip to question labral tear  I provided a prescription for this today  In the meantime she may continue with her previously prescribed physical therapy  I noted to Loco that would like her to follow up with me when she has the MRI of her left hip complete  Subjective:   Michael Meléndez is a 19 y o  female who presents for initial evaluation of her left hip  She states she has been dealing with intermittent and mild to moderate sharp pain about the anterior aspect of her hip for approximately 3-4 years  She states that 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 and 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  She states that approximately 3-4 years ago she noticed with felt like a strain of the hip at a 20 minutes and was seen by doctors as suspected a strain as well  She continue to play softball following this injury  She states that she will experience an intermittent clicking that does not necessarily exacerbate her pain  She was seen approximately 1 month ago by her primary care doctor that recommended physical therapy    She has completed about 4 weeks of physical therapy and notes that she does feel more flexible in her hips and lumbar spine  However feels that the discomfort has not alleviated  She also notes that internally rotating her hip while weight-bearing or ambulating alleviates her symptoms  Today she denies any distal paresthesias  However notes that if she is seated for long period of time she will experience a mild numbness into the small toe that subsides with movement of the ankle  Review of Systems   Constitutional: Negative for chills, fever and unexpected weight change  HENT: Negative for hearing loss, nosebleeds and sore throat  Eyes: Negative for pain, redness and visual disturbance  Respiratory: Negative for cough, shortness of breath and wheezing  Cardiovascular: Negative for chest pain, palpitations and leg swelling  Gastrointestinal: Negative for abdominal pain, nausea and vomiting  Endocrine: Negative for polydipsia and polyuria  Genitourinary: Negative for dysuria and hematuria  Musculoskeletal: Positive for arthralgias and back pain  See HPI   Skin: Negative for rash and wound  Neurological: Negative for dizziness, numbness and headaches  Psychiatric/Behavioral: Negative for decreased concentration and suicidal ideas  The patient is not nervous/anxious  Past Medical History:   Diagnosis Date    Obesity 8/11/2016       Past Surgical History:   Procedure Laterality Date    HAND SURGERY      HAND SURGERY      WISDOM TOOTH EXTRACTION         Family History   Problem Relation Age of Onset    Hypertension Mother     Heart disease Mother     Heart disease Father     Hypertension Father        Social History     Occupational History    Not on file       Social History Main Topics    Smoking status: Never Smoker    Smokeless tobacco: Never Used    Alcohol use No    Drug use: No    Sexual activity: Not on file         Current Outpatient Prescriptions:     cyclobenzaprine (FLEXERIL) 10 mg tablet, Take 1 tablet (10 mg total) by mouth 3 (three) times a day as needed for muscle spasms for up to 20 doses, Disp: 20 tablet, Rfl: 0    No Known Allergies    Objective:  Vitals:    10/30/18 0758   BP: 129/83   Pulse: (!) 114       Left Hip Exam     Tenderness   The patient is experiencing tenderness in the anterior (Iliopsoas)  Range of Motion   Extension: 20   Flexion: 140 (pain with hyperflexion)   Internal Rotation: 30   External Rotation: 70   Abduction: 45   Adduction: 25     Muscle Strength   Flexion: 5/5     Other   Erythema: absent  Scars: absent  Sensation: normal  Pulse: present    Comments:  Log roll:  Negative  SI compression test:  Negative            Physical Exam   Constitutional: She is oriented to person, place, and time  She appears well-developed and well-nourished  HENT:   Head: Normocephalic and atraumatic  Eyes: Conjunctivae are normal    Neck: Normal range of motion  Cardiovascular: Normal rate  Pulmonary/Chest: Effort normal    Musculoskeletal:   As noted in HPI   Neurological: She is alert and oriented to person, place, and time  Skin: Skin is warm and dry  Psychiatric: She has a normal mood and affect  Her behavior is normal  Judgment and thought content normal    Nursing note and vitals reviewed  I have personally reviewed pertinent films in PACS and my interpretation is as follows:    X-rays of the left hip demonstrates slight beaking of the left acetabulum    No acute fracture or other osseous abnormalities

## 2018-11-01 ENCOUNTER — TRANSCRIBE ORDERS (OUTPATIENT)
Dept: PHYSICAL THERAPY | Facility: CLINIC | Age: 18
End: 2018-11-01

## 2018-11-01 ENCOUNTER — OFFICE VISIT (OUTPATIENT)
Dept: PHYSICAL THERAPY | Facility: CLINIC | Age: 18
End: 2018-11-01
Payer: COMMERCIAL

## 2018-11-01 DIAGNOSIS — M54.50 LEFT-SIDED LOW BACK PAIN WITHOUT SCIATICA, UNSPECIFIED CHRONICITY: ICD-10-CM

## 2018-11-01 DIAGNOSIS — M25.552 LEFT HIP PAIN: Primary | ICD-10-CM

## 2018-11-01 PROCEDURE — G8979 MOBILITY GOAL STATUS: HCPCS

## 2018-11-01 PROCEDURE — G8978 MOBILITY CURRENT STATUS: HCPCS

## 2018-11-01 PROCEDURE — 97140 MANUAL THERAPY 1/> REGIONS: CPT

## 2018-11-01 PROCEDURE — 97112 NEUROMUSCULAR REEDUCATION: CPT

## 2018-11-01 PROCEDURE — 97110 THERAPEUTIC EXERCISES: CPT

## 2018-11-01 NOTE — LETTER
2018    Obey Rangel, [address]    Patient: Vicki Mayfield   YOB: 2000   Date of Visit: 2018     Encounter Diagnosis     ICD-10-CM    1  Left hip pain M25 552    2  Left-sided low back pain without sciatica, unspecified chronicity M54 5        Dear Dr Jc Chapa:    Please review the attached Plan of Care from [facility] recent visit  Please verify that you agree therapy should continue by signing the attached document and sending it back to our office  If you have any questions or concerns, please don't hesitate to call  Sincerely,    Kirill Rubio, PT      Referring Provider:      I certify that I have read the below Plan of Care and certify the need for these services furnished under this plan of treatment while under my care  Obey Rangel, [address]  [address] [place]          PT Re-Evaluation     Today's date: 2018  Patient name: Vicki Mayfield  : 2000  MRN: [id number]  Referring provider: Michelle Hayden*  Dx:   Encounter Diagnosis     ICD-10-CM    1  Left hip pain M25 552    2  Left-sided low back pain without sciatica, unspecified chronicity M54 5                   Assessment  Impairments: abnormal gait, abnormal or restricted ROM, abnormal movement, activity intolerance, impaired balance, impaired physical strength, lacks appropriate home exercise program, pain with function and poor body mechanics    Assessment details: Vicki Mayfield is a 19 y o  female who has been seen in physical therapy for 10 visits secondary to the diagnosis of Left hip pain  (primary encounter diagnosis)  Left-sided low back pain without sciatica, unspecified chronicity and is making steady gains towards established goals   The patient has demonstrated decreased pain level, improved lower extremity strength, improved core stabilization, improved lower extremity ROM and improved flexibility  She would benefit from continued PT services to address remaining impairments outlined in Progress Note and to maximize function  Understanding of Dx/Px/POC: good   Prognosis: good    Goals  Short Term Goals (4 weeks)  Pt will be independent in basic Home Exercise Program -MET  Pt will demonstrate good hip hinge and squat form-MET  Pt will demonstrate improved left hip ROM by 10 degrees in all directions-MET  Pt will be able to perform ADLs with pain no more than 3/10- NOT MET    Long Term Goals (6-8 weeks)  Pt will be independent in comprehensive Home Exercise Program- NOT MET  Pt will demonstrate improved FOTO status score by 10 points- NOT MET  Pt will demonstrate improved hip MMT strength to at least 4+/5- NOT MET  Pt will be able to return to gym routine with minimal to no limitations- NOT MET    Plan  Patient would benefit from: skilled PT  Referral necessary: No  Planned modality interventions: cryotherapy and thermotherapy: hydrocollator packs  Planned therapy interventions: ADL training, activity modification, balance, body mechanics training, flexibility, functional ROM exercises, gait training, graded exercise, home exercise program, transfer training, therapeutic exercise, therapeutic activities, stretching, strengthening, postural training, patient education, neuromuscular re-education, manual therapy and joint mobilization  Frequency: 2x week  Duration in weeks: 8  Treatment plan discussed with: patient        Subjective Evaluation    History of Present Illness  Mechanism of injury: Pt reports she feels PT is helping in regards to her strength and mobility but her pain levels remain the same  Pt with continued difficulties standing greater than 1 hour, walking greater than 30-45 min, and lifting  Pt states that she continues to get relief with IR of the hip with ambulation but it increases her knee pain    Pt reports that she has not been suffering from any tingling or numbness down her leg or in toes anymore  Pt recently had a consult with Dr Mason Thompson and was referred to get an MRI on the   Not a recurrent problem   Pain  No pain reported  Current pain ratin  At best pain ratin  At worst pain ratin  Location: left hip  Quality: sharp, radiating, tight, dull ache and discomfort  Relieving factors: medications, ice and heat  Aggravating factors: standing, walking, sitting and lifting  Progression: no change    Social Support    Employment status: working (Full time student- internship social work major, and part time at [employer])  Exercise history: everyday, decreased to 1-2 times      Diagnostic Tests  No diagnostic tests performed  Treatments  Previous treatment: medication  Patient Goals  Patient goals for therapy: decreased pain and return to sport/leisure activities (Ongoing)          Objective     Special Questions  Negative for bladder dysfunction and bowel dysfunction    Palpation   Left   No palpable tenderness to the iliopsoas and lumbar paraspinals  Tenderness of the gluteus medius, lumbar paraspinals, quadratus lumborum and TFL  Right   No palpable tenderness to the iliopsoas  Tenderness     Lumbar Spine  No tenderness in the spinous process  Left Hip   Tenderness in the greater trochanter  No tenderness in the PSIS  Right Hip   No tenderness in the PSIS       Neurological Testing     Sensation     Lumbar   Left   Intact: light touch    Right   Intact: light touch    Active Range of Motion     Lumbar   Flexion: 90 (%) degrees   Extension: 100 (%) degrees   Left lateral flexion: 90 (%) degrees   Right lateral flexion: 90 (%) degrees   Left rotation: 90 (%) degrees   Right rotation: 90 (%) degrees   Left Hip   Flexion: 105 degrees   External rotation (90/90): 40 degrees with pain  Internal rotation (90/90): 45 degrees with pain    Right Hip   Flexion: 100 degrees   External rotation (90/90): 45 degrees   Internal rotation (90/90): 50 degrees     Strength/Myotome Testing     Left Hip   Planes of Motion   Flexion: 4+  Extension: 5  Abduction: 4+  Adduction: 5 and WFL    Right Hip   Planes of Motion   Flexion: 5  Extension: 5  Abduction: 5  Adduction: 5    Left Knee   Extension: 4+    Right Knee   Extension: 5    Left Ankle/Foot   Dorsiflexion: 5    Muscle Activation   Patient unable to activate left transverse abdominals and right transverse abdominals  Tests       Thoracic   Negative slump  Lumbar   Negative repeated flexion, repeated extension and slump  Left   Negative passive SLR  Right   Negative passive SLR  Left Hip   Positive GEORGE  Negative Ely's, scour and sign of the buttock  Modified Kota: Positive  Additional Tests Details  Flexibility: min/ mod tightness in bilateral hamstrings    Ambulation     Ambulation: Stairs   Ascend stairs: independent  Pattern: reciprocal  Railings: without rails  Descend stairs: independent  Pattern: reciprocal  Railings: without rails  Curbs: independent    Observational Gait   Decreased walking speed, left stance time and right step length  Left foot contact pattern: heel to toe  Right foot contact pattern: heel to toe    Quality of Movement During Gait     Knee    Knee (Left): Positive valgus  Knee (Right): Positive valgus  Ankle    Ankle (Left): Positive pronated  Ankle (Right): Positive pronated  Functional Assessment   Squat   Pain, left tibial anterior translation beyond toes and right tibial anterior translation beyond toes  Forward Step Up 6"   Left Leg  Valgus  Right Leg  Valgus       Single Leg Stance   Left: 30 (decreased ankle stability) seconds  Right: 30 seconds      Flowsheet Rows      Most Recent Value   PT/OT G-Codes   Current Score  62   Projected Score  71   FOTO information reviewed  Yes   Assessment Type  Re-evaluation   G code set  Mobility: Walking & Moving Around   Mobility: Walking and Moving Around Current Status ()     Mobility: Walking and Moving Around Goal Status ()           MedBridge access code: [email or web address]    Precautions standard    Specialty Daily Treatment Diary     Manual  10/23 10/25 10/29 11/1    PROM hip All directions All directions All directions All directions    Hamstring and quad stretch 30 sec, 1x3 30 sec, 1x3 30 sec, 1x3 30 sec, 1x3    STM  Left TFL and IASTM along ITB Left TFL Left TFL    Hip distraction Lateral and long, gr III,IV                   Exercise Diary         Upright bike 5 min, lv 8 5 min, lv 8 5 min, lv 6 5 min, lv 6    clamshells Black, Hold 5, 2x10 Black, Hold 5, 2x10 Black, Hold 5, 2x10 Black, Hold 5, 2x10    Ball squeeze Hold 5, 2x10  Hold 5, 2x10  Hold 5, 2x10  Hold 5, 2x10     Hip abduction Sidelying, Hold 5, 3x5 bilateral  Sidelying, Hold 5, 3x5 bilatera Sidelying, Hold 5, 3x5 bilateral Sidelying, Hold 5, 3x5 bilateral    bridges Hold 5, 2x10  SL Hold 5, 2x10  SL Hold 5, 2x10  SL Hold 5, 2x10     HS stretch with strap  30 sec, 1x3 left 30 sec, 1x3 left 30 sec, 1x3 left 30 sec, 1x3 left    Heel slides        Dead bugs 2x10 2x10 with UE 2x10 with UE 2x10 with UE    Thoracic ext, thoracic rotation stretches        Bent knee fall outs Hold 5, 2x10 Hold 5, 2x10 Hold 5, 2x10 Hold 5, 2x10     Squats off low mat 2x10 2x10 2x10 2x10    Side stepping Red, 2x10 Red, 2x10 Green, 2x10 Green, 2x10                        Modalities        CP/MHP

## 2018-11-01 NOTE — PROGRESS NOTES
PT Re-Evaluation     Today's date: 2018  Patient name: Torres Mari  : 2000  MRN: [id number]  Referring provider: Michelle Boland*  Dx:   Encounter Diagnosis     ICD-10-CM    1  Left hip pain M25 552    2  Left-sided low back pain without sciatica, unspecified chronicity M54 5                   Assessment  Impairments: abnormal gait, abnormal or restricted ROM, abnormal movement, activity intolerance, impaired balance, impaired physical strength, lacks appropriate home exercise program, pain with function and poor body mechanics    Assessment details: Torres Mari is a 19 y o  female who has been seen in physical therapy for 10 visits secondary to the diagnosis of Left hip pain  (primary encounter diagnosis)  Left-sided low back pain without sciatica, unspecified chronicity and is making steady gains towards established goals  The patient has demonstrated decreased pain level, improved lower extremity strength, improved core stabilization, improved lower extremity ROM and improved flexibility  She would benefit from continued PT services to address remaining impairments outlined in Progress Note and to maximize function     Understanding of Dx/Px/POC: good   Prognosis: good    Goals  Short Term Goals (4 weeks)  Pt will be independent in basic Home Exercise Program -MET  Pt will demonstrate good hip hinge and squat form-MET  Pt will demonstrate improved left hip ROM by 10 degrees in all directions-MET  Pt will be able to perform ADLs with pain no more than 3/10- NOT MET    Long Term Goals (6-8 weeks)  Pt will be independent in comprehensive Home Exercise Program- NOT MET  Pt will demonstrate improved FOTO status score by 10 points- NOT MET  Pt will demonstrate improved hip MMT strength to at least 4+/5- NOT MET  Pt will be able to return to gym routine with minimal to no limitations- NOT MET    Plan  Patient would benefit from: skilled PT  Referral necessary: No  Planned modality interventions: cryotherapy and thermotherapy: hydrocollator packs  Planned therapy interventions: ADL training, activity modification, balance, body mechanics training, flexibility, functional ROM exercises, gait training, graded exercise, home exercise program, transfer training, therapeutic exercise, therapeutic activities, stretching, strengthening, postural training, patient education, neuromuscular re-education, manual therapy and joint mobilization  Frequency: 2x week  Duration in weeks: 8  Treatment plan discussed with: patient        Subjective Evaluation    History of Present Illness  Mechanism of injury: Pt reports she feels PT is helping in regards to her strength and mobility but her pain levels remain the same  Pt with continued difficulties standing greater than 1 hour, walking greater than 30-45 min, and lifting  Pt states that she continues to get relief with IR of the hip with ambulation but it increases her knee pain  Pt reports that she has not been suffering from any tingling or numbness down her leg or in toes anymore  Pt recently had a consult with Dr Diana Amado and was referred to get an MRI on the      Not a recurrent problem   Pain  No pain reported  Current pain ratin  At best pain ratin  At worst pain ratin  Location: left hip  Quality: sharp, radiating, tight, dull ache and discomfort  Relieving factors: medications, ice and heat  Aggravating factors: standing, walking, sitting and lifting  Progression: no change    Social Support    Employment status: working (Full time student- internship social work major, and part time at [employer])  Exercise history: everyday, decreased to 1-2 times      Diagnostic Tests  No diagnostic tests performed  Treatments  Previous treatment: medication  Patient Goals  Patient goals for therapy: decreased pain and return to sport/leisure activities (Ongoing)          Objective     Special Questions  Negative for bladder dysfunction and bowel dysfunction    Palpation   Left   No palpable tenderness to the iliopsoas and lumbar paraspinals  Tenderness of the gluteus medius, lumbar paraspinals, quadratus lumborum and TFL  Right   No palpable tenderness to the iliopsoas  Tenderness     Lumbar Spine  No tenderness in the spinous process  Left Hip   Tenderness in the greater trochanter  No tenderness in the PSIS  Right Hip   No tenderness in the PSIS  Neurological Testing     Sensation     Lumbar   Left   Intact: light touch    Right   Intact: light touch    Active Range of Motion     Lumbar   Flexion: 90 (%) degrees   Extension: 100 (%) degrees   Left lateral flexion: 90 (%) degrees   Right lateral flexion: 90 (%) degrees   Left rotation: 90 (%) degrees   Right rotation: 90 (%) degrees   Left Hip   Flexion: 105 degrees   External rotation (90/90): 40 degrees with pain  Internal rotation (90/90): 45 degrees with pain    Right Hip   Flexion: 100 degrees   External rotation (90/90): 45 degrees   Internal rotation (90/90): 50 degrees     Strength/Myotome Testing     Left Hip   Planes of Motion   Flexion: 4+  Extension: 5  Abduction: 4+  Adduction: 5 and WFL    Right Hip   Planes of Motion   Flexion: 5  Extension: 5  Abduction: 5  Adduction: 5    Left Knee   Extension: 4+    Right Knee   Extension: 5    Left Ankle/Foot   Dorsiflexion: 5    Muscle Activation   Patient unable to activate left transverse abdominals and right transverse abdominals  Tests       Thoracic   Negative slump  Lumbar   Negative repeated flexion, repeated extension and slump  Left   Negative passive SLR  Right   Negative passive SLR  Left Hip   Positive GEORGE  Negative Ely's, scour and sign of the buttock  Modified Kota: Positive       Additional Tests Details  Flexibility: min/ mod tightness in bilateral hamstrings    Ambulation     Ambulation: Stairs   Ascend stairs: independent  Pattern: reciprocal  Railings: without rails  Descend stairs: independent  Pattern: reciprocal  Railings: without rails  Curbs: independent    Observational Gait   Decreased walking speed, left stance time and right step length  Left foot contact pattern: heel to toe  Right foot contact pattern: heel to toe    Quality of Movement During Gait     Knee    Knee (Left): Positive valgus  Knee (Right): Positive valgus  Ankle    Ankle (Left): Positive pronated  Ankle (Right): Positive pronated  Functional Assessment   Squat   Pain, left tibial anterior translation beyond toes and right tibial anterior translation beyond toes  Forward Step Up 6"   Left Leg  Valgus  Right Leg  Valgus       Single Leg Stance   Left: 30 (decreased ankle stability) seconds  Right: 30 seconds      Flowsheet Rows      Most Recent Value   PT/OT G-Codes   Current Score  62   Projected Score  71   FOTO information reviewed  Yes   Assessment Type  Re-evaluation   G code set  Mobility: Walking & Moving Around   Mobility: Walking and Moving Around Current Status ()     Mobility: Walking and Moving Around Goal Status ()           [place] access code: [facility]    Precautions standard    Specialty Daily Treatment Diary     Manual  10/23 10/25 10/29 11/1    PROM hip All directions All directions All directions All directions    Hamstring and quad stretch 30 sec, 1x3 30 sec, 1x3 30 sec, 1x3 30 sec, 1x3    STM  Left TFL and IASTM along ITB Left TFL Left TFL    Hip distraction Lateral and long, gr III,IV                   Exercise Diary         Upright bike 5 min, lv 8 5 min, lv 8 5 min, lv 6 5 min, lv 6    clamshells Black, Hold 5, 2x10 Black, Hold 5, 2x10 Black, Hold 5, 2x10 Black, Hold 5, 2x10    Ball squeeze Hold 5, 2x10  Hold 5, 2x10  Hold 5, 2x10  Hold 5, 2x10     Hip abduction Sidelying, Hold 5, 3x5 bilateral  Sidelying, Hold 5, 3x5 bilatera Sidelying, Hold 5, 3x5 bilateral Sidelying, Hold 5, 3x5 bilateral    bridges Hold 5, 2x10  SL Hold 5, 2x10  SL Hold 5, 2x10  SL Hold 5, 2x10     HS stretch with strap  30 sec, 1x3 left 30 sec, 1x3 left 30 sec, 1x3 left 30 sec, 1x3 left    Heel slides        Dead bugs 2x10 2x10 with UE 2x10 with UE 2x10 with UE    Thoracic ext, thoracic rotation stretches        Bent knee fall outs Hold 5, 2x10 Hold 5, 2x10 Hold 5, 2x10 Hold 5, 2x10     Squats off low mat 2x10 2x10 2x10 2x10    Side stepping Red, 2x10 Red, 2x10 Green, 2x10 Green, 2x10                        Modalities        CP/MHP

## 2018-11-05 ENCOUNTER — APPOINTMENT (OUTPATIENT)
Dept: PHYSICAL THERAPY | Facility: CLINIC | Age: 18
End: 2018-11-05
Payer: COMMERCIAL

## 2018-11-08 ENCOUNTER — APPOINTMENT (OUTPATIENT)
Dept: PHYSICAL THERAPY | Facility: CLINIC | Age: 18
End: 2018-11-08
Payer: COMMERCIAL

## 2018-11-09 ENCOUNTER — OFFICE VISIT (OUTPATIENT)
Dept: PHYSICAL THERAPY | Facility: CLINIC | Age: 18
End: 2018-11-09
Payer: COMMERCIAL

## 2018-11-09 DIAGNOSIS — M25.552 LEFT HIP PAIN: Primary | ICD-10-CM

## 2018-11-09 DIAGNOSIS — M54.50 LEFT-SIDED LOW BACK PAIN WITHOUT SCIATICA, UNSPECIFIED CHRONICITY: ICD-10-CM

## 2018-11-09 PROCEDURE — 97140 MANUAL THERAPY 1/> REGIONS: CPT

## 2018-11-09 PROCEDURE — 97112 NEUROMUSCULAR REEDUCATION: CPT

## 2018-11-09 PROCEDURE — 97110 THERAPEUTIC EXERCISES: CPT

## 2018-11-09 NOTE — PROGRESS NOTES
Daily Note     Today's date: 2018  Patient name: Vanita Puentes  : 2000  MRN: [id number]  Referring provider: Joselyn Montoya*  Dx:   Encounter Diagnoses   Name Primary?  Left hip pain Yes    Left-sided low back pain without sciatica, unspecified chronicity                   Subjective: Pt reports 6-7/10 pain in hip today  States pain has remained the same  Feels her hip is getting tighter         Objective: See treatment diary below    Precautions standard    Specialty Daily Treatment Diary     Manual  10/23 10/25 10/29 11/1 11/9   PROM hip All directions All directions All directions All directions All directions   Hamstring and quad stretch 30 sec, 1x3 30 sec, 1x3 30 sec, 1x3 30 sec, 1x3 30 sec, 1x3   STM  Left TFL and IASTM along ITB Left TFL Left TFL Left TFL   Hip distraction Lateral and long, gr III,IV                   Exercise Diary         Upright bike 5 min, lv 8 5 min, lv 8 5 min, lv 6 5 min, lv 6 5 min, lv 6   clamshells Black, Hold 5, 2x10 Black, Hold 5, 2x10 Black, Hold 5, 2x10 Black, Hold 5, 2x10 SL green, Hold 5, 2x10 ea   Ball squeeze Hold 5, 2x10  Hold 5, 2x10  Hold 5, 2x10  Hold 5, 2x10  Hold 5, 2x10    Hip abduction Sidelying, Hold 5, 3x5 bilateral  Sidelying, Hold 5, 3x5 bilatera Sidelying, Hold 5, 3x5 bilateral Sidelying, Hold 5, 3x5 bilateral Sidelying, Hold 5, 3x5 bilateral   bridges Hold 5, 2x10  SL Hold 5, 2x10  SL Hold 5, 2x10  SL Hold 5, 2x10  SL Hold 5, 2x10    HS stretch with strap  30 sec, 1x3 left 30 sec, 1x3 left 30 sec, 1x3 left 30 sec, 1x3 left    Heel slides        Dead bugs 2x10 2x10 with UE 2x10 with UE 2x10 with UE 2x10 with UE   Thoracic ext, thoracic rotation stretches        Bent knee fall outs Hold 5, 2x10 Hold 5, 2x10 Hold 5, 2x10 Hold 5, 2x10  Hold 5, 2x10    Squats off low mat 2x10 2x10 2x10 2x10 2x10   Side stepping Red, 2x10 Red, 2x10 Green, 2x10 Green, 2x10    [place] 4 way hip     3 0 1x15 ea               Modalities        CP/MHP Assessment: Pt tolerated treatment well with minimal complaints of pain  Pt demonstrating improved LE strength, minimal fatigue reported  Pt continues to present with left hip in IR during ambulation for symptom relief  Plan: Continue with plan of care to decrease pain, improve mobility, strength, and function

## 2018-11-12 ENCOUNTER — APPOINTMENT (OUTPATIENT)
Dept: PHYSICAL THERAPY | Facility: CLINIC | Age: 18
End: 2018-11-12
Payer: COMMERCIAL

## 2018-11-12 NOTE — PROGRESS NOTES
Daily Note     Today's date: 2018  Patient name: Dusty Leonard  : 2000  MRN: [id number]  Referring provider: Betty Pinto*  Dx:   Encounter Diagnoses   Name Primary?  Left hip pain Yes    Left-sided low back pain without sciatica, unspecified chronicity                   Subjective: Pt reports       Objective: See treatment diary below    Precautions standard    Specialty Daily Treatment Diary     Manual  11/12  10/29 11/1 11/9   PROM hip All directions  All directions All directions All directions   Hamstring and quad stretch 30 sec, 1x3  30 sec, 1x3 30 sec, 1x3 30 sec, 1x3   STM   Left TFL Left TFL Left TFL   Hip distraction                    Exercise Diary         Upright bike 5 min, lv 6  5 min, lv 6 5 min, lv 6 5 min, lv 6   clamshells SL green, Hold 5, 2x10 ea  Black, Hold 5, 2x10 Black, Hold 5, 2x10 SL green, Hold 5, 2x10 ea   Ball squeeze Hold 5, 2x10   Hold 5, 2x10  Hold 5, 2x10  Hold 5, 2x10    Hip abduction Sidelying, Hold 5, 3x5 bilateral   Sidelying, Hold 5, 3x5 bilateral Sidelying, Hold 5, 3x5 bilateral Sidelying, Hold 5, 3x5 bilateral   bridges SL Hold 5, 2x10   SL Hold 5, 2x10  SL Hold 5, 2x10  SL Hold 5, 2x10    HS stretch with strap  30 sec, 1x3 left  30 sec, 1x3 left 30 sec, 1x3 left    Heel slides        Dead bugs 2x10 with UE  2x10 with UE 2x10 with UE 2x10 with UE   Thoracic ext, thoracic rotation stretches        Bent knee fall outs Hold 5, 2x10  Hold 5, 2x10 Hold 5, 2x10  Hold 5, 2x10    Squats off low mat 2x10  2x10 2x10 2x10   Side stepping Red, 2x10  Green, 2x10 Green, 2x10    [place] 4 way hip 3 0 1x15 ea    3 0 1x15 ea               Modalities        CP/MHP                    Assessment: {ASSESSMENT:24084}        Plan: Continue with plan of care to decrease pain, improve mobility, strength, and function

## 2018-11-15 ENCOUNTER — APPOINTMENT (OUTPATIENT)
Dept: PHYSICAL THERAPY | Facility: CLINIC | Age: 18
End: 2018-11-15
Payer: COMMERCIAL

## 2018-11-15 ENCOUNTER — HOSPITAL ENCOUNTER (OUTPATIENT)
Dept: RADIOLOGY | Facility: HOSPITAL | Age: 18
Discharge: HOME | End: 2018-11-15
Attending: ORTHOPAEDIC SURGERY

## 2018-11-15 DIAGNOSIS — M25.552 PAIN IN LEFT HIP: ICD-10-CM

## 2018-11-16 ENCOUNTER — TRANSCRIBE ORDERS (OUTPATIENT)
Dept: ADMINISTRATIVE | Facility: HOSPITAL | Age: 18
End: 2018-11-16

## 2018-11-16 DIAGNOSIS — M25.552 LEFT HIP PAIN: Primary | ICD-10-CM

## 2018-11-30 ENCOUNTER — HOSPITAL ENCOUNTER (OUTPATIENT)
Dept: RADIOLOGY | Facility: HOSPITAL | Age: 18
Discharge: HOME | End: 2018-11-30
Attending: ORTHOPAEDIC SURGERY | Admitting: RADIOLOGY
Payer: COMMERCIAL

## 2018-11-30 ENCOUNTER — HOSPITAL ENCOUNTER (OUTPATIENT)
Dept: RADIOLOGY | Facility: HOSPITAL | Age: 18
Discharge: HOME | End: 2018-11-30
Attending: ORTHOPAEDIC SURGERY
Payer: COMMERCIAL

## 2018-11-30 DIAGNOSIS — M25.552 LEFT HIP PAIN: ICD-10-CM

## 2018-11-30 DIAGNOSIS — M25.552 PAIN IN LEFT HIP: ICD-10-CM

## 2018-11-30 PROCEDURE — 27095 INJECTION FOR HIP X-RAY: CPT

## 2018-11-30 PROCEDURE — A9585 GADOBUTROL INJECTION: HCPCS | Performed by: ORTHOPAEDIC SURGERY

## 2018-11-30 PROCEDURE — 73722 MRI JOINT OF LWR EXTR W/DYE: CPT

## 2018-11-30 PROCEDURE — 77002 NEEDLE LOCALIZATION BY XRAY: CPT

## 2018-11-30 RX ORDER — LIDOCAINE HYDROCHLORIDE 10 MG/ML
2 INJECTION, SOLUTION EPIDURAL; INFILTRATION; INTRACAUDAL; PERINEURAL ONCE
Status: COMPLETED | OUTPATIENT
Start: 2018-11-30 | End: 2018-11-30

## 2018-11-30 RX ADMIN — IOHEXOL 3 ML: 300 INJECTION, SOLUTION INTRAVENOUS at 15:20

## 2018-11-30 RX ADMIN — LIDOCAINE HYDROCHLORIDE 2 ML: 10 INJECTION, SOLUTION EPIDURAL; INFILTRATION; INTRACAUDAL; PERINEURAL at 15:20

## 2018-11-30 RX ADMIN — GADOBUTROL 2 ML: 604.72 INJECTION INTRAVENOUS at 15:52

## 2018-12-04 ENCOUNTER — OFFICE VISIT (OUTPATIENT)
Dept: OBGYN CLINIC | Facility: CLINIC | Age: 18
End: 2018-12-04
Payer: COMMERCIAL

## 2018-12-04 VITALS
BODY MASS INDEX: 39.28 KG/M2 | WEIGHT: 280.6 LBS | HEIGHT: 71 IN | SYSTOLIC BLOOD PRESSURE: 145 MMHG | HEART RATE: 134 BPM | DIASTOLIC BLOOD PRESSURE: 112 MMHG

## 2018-12-04 DIAGNOSIS — M24.852 SNAPPING HIP SYNDROME, LEFT: Primary | ICD-10-CM

## 2018-12-04 PROCEDURE — 99214 OFFICE O/P EST MOD 30 MIN: CPT | Performed by: ORTHOPAEDIC SURGERY

## 2018-12-04 NOTE — PROGRESS NOTES
Assessment/Plan:  1  Snapping hip syndrome, left  Ambulatory referral to Orthopedic Surgery       The patient had a normal MRI she appears to have left internal snapping hip syndrome  She is limited to who she can see for this given her insurance  I did advise her to look for an orthopedic surgeon who takes her insurance who specializes in hip arthroscopy to perform possible release for this  We will see her back as needed for this  Subjective:   Sherri Heart is a 19 y o  female who presents today for follow-up of her left hip  We had ordered an MR arthrogram of the hip to rule out a labral tear which was negative  We are able to view these images today  She notes ongoing pain about the left groin, which is worse with activity and prolonged sitting and slightly better with rest   She notes an occasional snapping/popping sensation about the hip as well  She notes no significant improvement with physical therapy thus far  She states heat does help at times  She denies any pain radiating down the left lower extremity  She notes good sensation of the left lower extremity  She does have a history of some low back problems and has seen a chiropractor for this in the past     Review of Systems   Constitutional: Negative for chills, fever and unexpected weight change  HENT: Negative for hearing loss, nosebleeds and sore throat  Eyes: Negative for pain, redness and visual disturbance  Respiratory: Negative for cough, shortness of breath and wheezing  Cardiovascular: Negative for chest pain, palpitations and leg swelling  Gastrointestinal: Negative for abdominal pain, nausea and vomiting  Endocrine: Negative for polydipsia and polyuria  Genitourinary: Negative for dysuria and hematuria  Musculoskeletal:        See HPI   Skin: Negative for rash and wound  Neurological: Negative for dizziness, numbness and headaches     Psychiatric/Behavioral: Negative for decreased concentration and suicidal ideas  The patient is not nervous/anxious  Past Medical History:   Diagnosis Date    Obesity 8/11/2016       Past Surgical History:   Procedure Laterality Date    FL INJECTION LEFT HIP (ARTHROGRAM)  11/30/2018    HAND SURGERY      HAND SURGERY      WISDOM TOOTH EXTRACTION         Family History   Problem Relation Age of Onset    Hypertension Mother     Heart disease Mother     Heart disease Father     Hypertension Father        Social History     Occupational History    Not on file  Social History Main Topics    Smoking status: Never Smoker    Smokeless tobacco: Never Used    Alcohol use No    Drug use: No    Sexual activity: Not on file         Current Outpatient Prescriptions:     cyclobenzaprine (FLEXERIL) 10 mg tablet, Take 1 tablet (10 mg total) by mouth 3 (three) times a day as needed for muscle spasms for up to 20 doses, Disp: 20 tablet, Rfl: 0    No Known Allergies    Objective:  Vitals:    12/04/18 1035   BP: (!) 145/112   Pulse: (!) 134       Ortho Exam    Physical Exam   Constitutional: She is oriented to person, place, and time  She appears well-developed and well-nourished  No distress  HENT:   Head: Normocephalic and atraumatic  Eyes: Conjunctivae and EOM are normal  No scleral icterus  Neck: No JVD present  Cardiovascular: Normal rate and intact distal pulses  Pulmonary/Chest: Effort normal  No respiratory distress  Abdominal: Soft  She exhibits no distension  Neurological: She is alert and oriented to person, place, and time  Coordination normal    Skin: Skin is warm  Psychiatric: She has a normal mood and affect  I have personally reviewed pertinent films in PACS and my interpretation is as follows:  MR arthrogram left hip:  Negative    Left hip:  Benign appearance  Tenderness over the iliopsoas tendon  Pain with flexion of the hip as well as internal and external rotation  4+/5 strength hip flexion with mild discomfort    Palpable snap about the hip

## 2019-01-02 NOTE — PROGRESS NOTES
Pt was attending physical therapy secondary to a diagnosis of   1  Left hip pain    2  Left-sided low back pain without sciatica, unspecified chronicity      Pt was seen for initial evaluation on 10/2/18 and 10 follow up visits  Pt was last seen on 11/1/18 and has not since returned to physical therapy  Therefore, PT services are being discontinued  Discharge status and goal status are as per most recent progress note

## 2019-01-31 ENCOUNTER — APPOINTMENT (OUTPATIENT)
Dept: RADIOLOGY | Facility: CLINIC | Age: 19
End: 2019-01-31
Attending: FAMILY MEDICINE
Payer: COMMERCIAL

## 2019-01-31 ENCOUNTER — OFFICE VISIT (OUTPATIENT)
Dept: URGENT CARE | Facility: CLINIC | Age: 19
End: 2019-01-31
Payer: COMMERCIAL

## 2019-01-31 VITALS
RESPIRATION RATE: 16 BRPM | BODY MASS INDEX: 40.09 KG/M2 | DIASTOLIC BLOOD PRESSURE: 66 MMHG | SYSTOLIC BLOOD PRESSURE: 144 MMHG | HEIGHT: 70 IN | TEMPERATURE: 99.1 F | WEIGHT: 280 LBS | HEART RATE: 100 BPM | OXYGEN SATURATION: 100 %

## 2019-01-31 DIAGNOSIS — S50.02XA CONTUSION OF LEFT ELBOW, INITIAL ENCOUNTER: Primary | ICD-10-CM

## 2019-01-31 DIAGNOSIS — S40.012A CONTUSION OF LEFT SHOULDER, INITIAL ENCOUNTER: ICD-10-CM

## 2019-01-31 DIAGNOSIS — T14.90XA INJURY: ICD-10-CM

## 2019-01-31 PROCEDURE — 73080 X-RAY EXAM OF ELBOW: CPT

## 2019-01-31 PROCEDURE — 73030 X-RAY EXAM OF SHOULDER: CPT

## 2019-01-31 PROCEDURE — 99213 OFFICE O/P EST LOW 20 MIN: CPT | Performed by: FAMILY MEDICINE

## 2019-01-31 NOTE — PATIENT INSTRUCTIONS
1  Right shoulder contusion  - xray shows no acute abnormalities  - arm has been placed in a sling for comfort and support, it has been emphasized to the patient that the arm is not to be kept in the sling at all times due to risk of frozen joint   - rest the arm and apply ice to the shoulder   - take Tylenol or Motrin as needed for pain  - referral provided to Dr Junaid Nolasco in Orthopedics for further evaluation and treatment   2   Left elbow contusion   - xray shows no acute abnormalities  - elbow has been ace wrapped for comfort and support   - rest the arm and apply ice to the elbow  - take Tylenol or Motrin as needed for pain  - referral provided to Dr Junaid Nolasco in Orthopedics for further evaluation and treatment

## 2019-02-04 ENCOUNTER — OFFICE VISIT (OUTPATIENT)
Dept: OBGYN CLINIC | Facility: CLINIC | Age: 19
End: 2019-02-04
Payer: COMMERCIAL

## 2019-02-04 VITALS
HEIGHT: 71 IN | HEART RATE: 100 BPM | BODY MASS INDEX: 38.5 KG/M2 | WEIGHT: 275 LBS | SYSTOLIC BLOOD PRESSURE: 148 MMHG | DIASTOLIC BLOOD PRESSURE: 81 MMHG

## 2019-02-04 DIAGNOSIS — M25.512 ACUTE PAIN OF LEFT SHOULDER: Primary | ICD-10-CM

## 2019-02-04 PROCEDURE — 99214 OFFICE O/P EST MOD 30 MIN: CPT | Performed by: ORTHOPAEDIC SURGERY

## 2019-02-04 NOTE — PROGRESS NOTES
Assessment/Plan:  1  Acute pain of left shoulder  MRI shoulder left wo contrast     Fazal Purcell has left-sided shoulder pain after her fall 1 week ago  She does have weakness on examination today that is concerning for possible rotator cuff injury  I would like to obtain an MRI of her left shoulder to evaluate the rotator cuff itself  I will call her with results of the MRI and discuss appropriate follow-up at that time  She also has left-sided elbow pain which appears to be a contusion  She has full range of motion and diffuse tenderness  She should continue to ice and rest the elbow and may continue lifting as tolerated  We will follow up on her elbow pain once the MRI is complete  Subjective:   Rony Woo is a 22 y o  female who presents for evaluation of left shoulder and elbow pain  She had a slip and fall injury 1 week ago where she landed on her left elbow and shoulder  She had immediate pain and swelling and presented to urgent care  She had xrays of both joints which were negative for fracture  She was given a sling and advised to follow up with me  Today she has pain in the left shoulder that is constant and moderate and radiates posteriorly  She feels very weak in the shoulder and is having trouble lifting her arm  She denies any numbness or tingling  She denies a history of shoulder pain  She also is having discomfort in the left elbow  She describes a constant ache that worsens with movement  She has been using a ace wrap that is helping  Review of Systems   Constitutional: Negative for chills, fever and unexpected weight change  HENT: Negative for hearing loss, nosebleeds and sore throat  Eyes: Negative for pain, redness and visual disturbance  Respiratory: Negative for cough, shortness of breath and wheezing  Cardiovascular: Negative for chest pain, palpitations and leg swelling  Gastrointestinal: Negative for abdominal pain, nausea and vomiting     Endocrine: Negative for polydipsia and polyuria  Genitourinary: Negative for dysuria and hematuria  Musculoskeletal:        See HPI   Skin: Negative for rash and wound  Neurological: Negative for dizziness, numbness and headaches  Psychiatric/Behavioral: Negative for decreased concentration and suicidal ideas  The patient is not nervous/anxious  Past Medical History:   Diagnosis Date    Obesity 8/11/2016       Past Surgical History:   Procedure Laterality Date    FL INJECTION LEFT HIP (ARTHROGRAM)  11/30/2018    HAND SURGERY      HAND SURGERY      WISDOM TOOTH EXTRACTION         Family History   Problem Relation Age of Onset    Hypertension Mother     Heart disease Mother     Heart disease Father     Hypertension Father        Social History     Occupational History    Not on file  Social History Main Topics    Smoking status: Never Smoker    Smokeless tobacco: Never Used    Alcohol use No    Drug use: No    Sexual activity: Not on file         Current Outpatient Prescriptions:     cyclobenzaprine (FLEXERIL) 10 mg tablet, Take 1 tablet (10 mg total) by mouth 3 (three) times a day as needed for muscle spasms for up to 20 doses, Disp: 20 tablet, Rfl: 0    No Known Allergies    Objective:  Vitals:    02/04/19 0922   BP: 148/81   Pulse: 100       Left Elbow Exam     Tenderness   The patient is experiencing tenderness in the lateral epicondyle, radial head and radial capitellar joint  Range of Motion   The patient has normal left elbow ROM  Muscle Strength   The patient has normal left elbow strength  Tests Varus: negative  Valgus: negative  Tinel's Sign (cubital tunnel): negative    Other   Erythema: absent  Sensation: normal  Pulse: present      Left Shoulder Exam     Tenderness   Left shoulder tenderness location: supraspinatus and infraspinatus insertional tenderness      Range of Motion   Active Abduction:  90 abnormal   Passive Abduction:  160 normal   Extension: normal   Forward Flexion: normal   External Rotation: normal   Internal Rotation 0 degrees:  Sacrum abnormal     Muscle Strength   Abduction: 3/5   Internal Rotation: 5/5   External Rotation: 4/5   Supraspinatus: 3/5   Subscapularis: 5/5   Biceps: 5/5     Tests   Drop Arm: positive  Hawkin's test: negative  Impingement: negative    Other   Erythema: absent  Sensation: normal  Pulse: present             Physical Exam   Constitutional: She is oriented to person, place, and time  She appears well-developed and well-nourished  HENT:   Head: Normocephalic and atraumatic  Eyes: Pupils are equal, round, and reactive to light  Conjunctivae are normal    Neck: Normal range of motion  Neck supple  Cardiovascular: Normal rate and intact distal pulses  Pulmonary/Chest: Effort normal  No respiratory distress  Musculoskeletal:   As noted in HPI   Neurological: She is alert and oriented to person, place, and time  Skin: Skin is warm and dry  Psychiatric: She has a normal mood and affect  Her behavior is normal    Vitals reviewed  I have personally reviewed pertinent films in PACS and my interpretation is as follows: Three-view x-rays of the left shoulder demonstrate no evidence of acute fracture or other abnormality  Three-view x-rays of the left elbow demonstrates soft tissue swelling laterally without evidence of fracture

## 2019-02-06 NOTE — PROGRESS NOTES
[facility] [facility]        NAME: Tab Oro is a 22 y o  female  : 2000    MRN: [id number]  DATE: 2019  TIME: 5:12 PM    Assessment and Plan   Contusion of left elbow, initial encounter [S50 02XA]  1  Contusion of left elbow, initial encounter  XR shoulder 2+ vw left    XR elbow 3+ vw left    Ambulatory referral to Orthopedic Surgery    Comfort Arm Sling   2  Contusion of left shoulder, initial encounter  Ambulatory referral to Orthopedic Surgery    Comfort Arm Sling         Patient Instructions     Patient Instructions   1  Right shoulder contusion  - xray shows no acute abnormalities  - arm has been placed in a sling for comfort and support, it has been emphasized to the patient that the arm is not to be kept in the sling at all times due to risk of frozen joint   - rest the arm and apply ice to the shoulder   - take Tylenol or Motrin as needed for pain  - referral provided to Dr Amrit Espinosa in Orthopedics for further evaluation and treatment   2  Left elbow contusion   - xray shows no acute abnormalities  - elbow has been ace wrapped for comfort and support   - rest the arm and apply ice to the elbow  - take Tylenol or Motrin as needed for pain  - referral provided to Dr Amrit Espinosa in Orthopedics for further evaluation and treatment    Follow up with PCP in 3-5 days  Proceed to  ER if symptoms worsen  Chief Complaint     Chief Complaint   Patient presents with    Injury     pt states she slipped and fell on ice landing on left arm; limited ROM of left arm; occurred 2-3 days ago         History of Present Illness       23 yo female, states she slipped and fell on ice at home 2 days ago  She states she fell onto her left side landing on her left arm  She denies any hitting of head or LOC  She is now c/o pain of the left elbow and shoulder  She denies any swelling or bruising  No numbness/tingling or weakness of the arm  No neck or back pain  No hand/wrist complaints  No chest pain or SOB  She has no other injuries or complaints related to the fall  She has pain and difficulty w/ ROM at the elbow and shoulder  She has been applying ice, no other treatment attempted  Review of Systems   Review of Systems   Constitutional: Negative  Musculoskeletal:        As noted in HPI   Skin: Negative  Neurological: Negative  Current Medications       Current Outpatient Prescriptions:     cyclobenzaprine (FLEXERIL) 10 mg tablet, Take 1 tablet (10 mg total) by mouth 3 (three) times a day as needed for muscle spasms for up to 20 doses, Disp: 20 tablet, Rfl: 0    Current Allergies     Allergies as of 01/31/2019    (No Known Allergies)            The following portions of the patient's history were reviewed and updated as appropriate: allergies, current medications, past family history, past medical history, past social history, past surgical history and problem list      Past Medical History:   Diagnosis Date    Obesity 8/11/2016       Past Surgical History:   Procedure Laterality Date    FL INJECTION LEFT HIP (ARTHROGRAM)  11/30/2018    HAND SURGERY      HAND SURGERY      WISDOM TOOTH EXTRACTION         Family History   Problem Relation Age of Onset    Hypertension Mother     Heart disease Mother     Heart disease Father     Hypertension Father          Medications have been verified  Objective   /66   Pulse 100   Temp 99 1 °F (37 3 °C)   Resp 16   Ht 5' 10" (1 778 m)   Wt 127 kg (280 lb)   LMP 01/25/2019 (Exact Date)   SpO2 100%   Breastfeeding? No   BMI 40 18 kg/m²        Physical Exam     Physical Exam   Constitutional: She is oriented to person, place, and time  Vital signs are normal  She appears well-developed and well-nourished  She is active and cooperative  Non-toxic appearance  She does not have a sickly appearance  She does not appear ill  No distress  Musculoskeletal:   Left shoulder: no swelling, erythema, or bruising  No tenderness to palpation  Shoulder w/ full ROM, but c/o pain w/ movement in all planes  Strength and sensations intact  Skin is appropriately warm and pink  Left elbow: no swelling, erythema, or bruising  There is mild tenderness to palpation of the radial head region  Elbow w/ full ROM in all planes, but c/o pain w/ movement in all planes  Strength and sensations intact  Skin is appropriately warm and pink  Neurological: She is alert and oriented to person, place, and time  Skin: Skin is warm, dry and intact  No bruising, no ecchymosis and no rash noted  She is not diaphoretic  No erythema  No pallor  Psychiatric: She has a normal mood and affect  Her behavior is normal  Judgment and thought content normal    Nursing note and vitals reviewed

## 2019-02-13 ENCOUNTER — HOSPITAL ENCOUNTER (OUTPATIENT)
Dept: RADIOLOGY | Facility: HOSPITAL | Age: 19
Discharge: HOME | End: 2019-02-13
Attending: ORTHOPAEDIC SURGERY
Payer: COMMERCIAL

## 2019-02-13 DIAGNOSIS — M25.512 ACUTE PAIN OF LEFT SHOULDER: ICD-10-CM

## 2019-02-13 PROCEDURE — 73221 MRI JOINT UPR EXTREM W/O DYE: CPT

## 2019-02-15 ENCOUNTER — TELEPHONE (OUTPATIENT)
Dept: OBGYN CLINIC | Facility: CLINIC | Age: 19
End: 2019-02-15

## 2019-02-15 NOTE — TELEPHONE ENCOUNTER
----- Message from [place] DO sent at 2/14/2019  4:02 PM EST -----  Madi Ruano had an MRI of her left shoulder  There was no evidence of rotator cuff tear after her fall  They did see some abnormality in her labrum but the radiologist thinks it may just be a normal variant  I do not think this is what is causing her pain however if it would persist we could get a special MRI to look at this  Hopefully she is feeling better with rest and ice  I would certainly try physical therapy before repeating another MRI  We can talk about the office if she wants

## 2019-03-27 ENCOUNTER — OFFICE VISIT (OUTPATIENT)
Dept: FAMILY MEDICINE CLINIC | Facility: CLINIC | Age: 19
End: 2019-03-27
Payer: COMMERCIAL

## 2019-03-27 VITALS
WEIGHT: 283 LBS | BODY MASS INDEX: 39.62 KG/M2 | HEIGHT: 71 IN | HEART RATE: 117 BPM | RESPIRATION RATE: 16 BRPM | DIASTOLIC BLOOD PRESSURE: 80 MMHG | TEMPERATURE: 98.7 F | SYSTOLIC BLOOD PRESSURE: 140 MMHG

## 2019-03-27 DIAGNOSIS — M54.9 ACUTE BILATERAL BACK PAIN, UNSPECIFIED BACK LOCATION: ICD-10-CM

## 2019-03-27 DIAGNOSIS — M54.50 LUMBAR BACK PAIN: Primary | ICD-10-CM

## 2019-03-27 DIAGNOSIS — M25.552 LEFT HIP PAIN: ICD-10-CM

## 2019-03-27 PROCEDURE — 99213 OFFICE O/P EST LOW 20 MIN: CPT | Performed by: FAMILY MEDICINE

## 2019-03-27 RX ORDER — MELOXICAM 15 MG/1
15 TABLET ORAL DAILY PRN
Qty: 30 TABLET | Refills: 0 | Status: SHIPPED | OUTPATIENT
Start: 2019-03-27 | End: 2019-07-31 | Stop reason: SDUPTHER

## 2019-03-27 RX ORDER — CYCLOBENZAPRINE HCL 10 MG
10 TABLET ORAL 3 TIMES DAILY PRN
Qty: 20 TABLET | Refills: 0 | Status: SHIPPED | OUTPATIENT
Start: 2019-03-27 | End: 2019-07-31 | Stop reason: SDUPTHER

## 2019-03-27 NOTE — PROGRESS NOTES
Clarissa Woodard 2000 female MRN: [id number]    FAMILY PRACTICE OFFICE VISIT  [facility] - [address]      ASSESSMENT/PLAN  Clarissa Woodard is a 22 y o  female presents to the office for    Diagnoses and all orders for this visit:    Lumbar back pain  -     Ambulatory referral to Physical Therapy; Future  -     Ambulatory referral to Orthopedic Surgery; Future  -     XR spine lumbar minimum 4 views non injury; Future  -     meloxicam (MOBIC) 15 mg tablet; Take 1 tablet (15 mg total) by mouth daily as needed for moderate pain    Left hip pain    Acute bilateral back pain, unspecified back location  -     cyclobenzaprine (FLEXERIL) 10 mg tablet; Take 1 tablet (10 mg total) by mouth 3 (three) times a day as needed for muscle spasms for up to 20 doses         Disposition: Return to the office after seen by Ortho, however most of these symptoms are likely 2/2 to chronic issues  No future appointments  SUBJECTIVE  CC: Back Pain      HPI:  Clarissa Woodard is a 22 y o  female who presents for an acute appointment  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  Seen in softball pitchers  Did PT for 6-8 weeks with minimal relief  Back pain-> tight feeling; saw chiropracter and said her hips were off when evaluated in highschool  B/l knee swelling come and goes  7-8/10 pain level today, but other days 0  No OTC meds with relief  Review of Systems   Constitutional: Negative for activity change, appetite change, chills, fatigue and fever  HENT: Negative for congestion  Respiratory: Negative for cough, chest tightness and shortness of breath  Cardiovascular: Negative for chest pain and leg swelling  Gastrointestinal: Negative for abdominal distention, abdominal pain, constipation, diarrhea, nausea and vomiting  Musculoskeletal: Positive for arthralgias, back pain and gait problem  All other systems reviewed and are negative        Historical Information   The patient history was reviewed as follows:  Past Medical History:   Diagnosis Date    Congenital pes planus of both feet     Resolved 1/312017     Difficulty walking     Resolved 1/31/2017     Mass of hand, left     Resolved 1/31/2017     Obesity 8/11/2016         Medications:     Current Outpatient Medications:     cyclobenzaprine (FLEXERIL) 10 mg tablet, Take 1 tablet (10 mg total) by mouth 3 (three) times a day as needed for muscle spasms for up to 20 doses, Disp: 20 tablet, Rfl: 0    No Known Allergies    OBJECTIVE  Vitals:   Vitals:    03/27/19 1510   BP: 140/80   BP Location: Left arm   Patient Position: Sitting   Cuff Size: Standard   Pulse: (!) 117   Resp: 16   Temp: 98 7 °F (37 1 °C)   Weight: 128 kg (283 lb)   Height: 5' 11" (1 803 m)         Physical Exam   Constitutional: She is oriented to person, place, and time  Vital signs are normal  She appears well-developed and well-nourished  HENT:   Head: Normocephalic and atraumatic  Right Ear: Tympanic membrane, external ear and ear canal normal    Left Ear: Tympanic membrane, external ear and ear canal normal    Nose: Nose normal    Mouth/Throat: Oropharynx is clear and moist    Eyes: Pupils are equal, round, and reactive to light  Conjunctivae and EOM are normal    Neck: Normal range of motion  Neck supple  Cardiovascular: Normal rate, regular rhythm, S1 normal, S2 normal, normal heart sounds and intact distal pulses  No murmur heard  Pulmonary/Chest: Effort normal and breath sounds normal  No respiratory distress  She has no wheezes  Musculoskeletal: Normal range of motion  She exhibits no edema  Thoracic back: She exhibits tenderness  Lumbar back: She exhibits tenderness  Walks with a left gait, with foot invertedno spasm    Neurological: She is alert and oriented to person, place, and time  She has normal strength  Skin: Skin is warm  Psychiatric: She has a normal mood and affect   Her speech is normal and behavior is normal  Judgment and thought content normal    Vitals reviewed                   Maik Ross MD,   [facility]  3/27/2019

## 2019-04-04 ENCOUNTER — APPOINTMENT (OUTPATIENT)
Dept: RADIOLOGY | Facility: CLINIC | Age: 19
End: 2019-04-04
Payer: COMMERCIAL

## 2019-04-04 ENCOUNTER — TRANSCRIBE ORDERS (OUTPATIENT)
Dept: RADIOLOGY | Facility: CLINIC | Age: 19
End: 2019-04-04

## 2019-04-04 ENCOUNTER — OFFICE VISIT (OUTPATIENT)
Dept: OBGYN CLINIC | Facility: CLINIC | Age: 19
End: 2019-04-04
Payer: COMMERCIAL

## 2019-04-04 VITALS
WEIGHT: 275 LBS | BODY MASS INDEX: 38.5 KG/M2 | HEIGHT: 71 IN | SYSTOLIC BLOOD PRESSURE: 159 MMHG | HEART RATE: 100 BPM | DIASTOLIC BLOOD PRESSURE: 98 MMHG

## 2019-04-04 DIAGNOSIS — M54.89 MIDLINE BACK PAIN, UNSPECIFIED BACK LOCATION, UNSPECIFIED CHRONICITY: Primary | ICD-10-CM

## 2019-04-04 DIAGNOSIS — M54.89 MIDLINE BACK PAIN, UNSPECIFIED BACK LOCATION, UNSPECIFIED CHRONICITY: ICD-10-CM

## 2019-04-04 DIAGNOSIS — M54.50 LUMBAR BACK PAIN: ICD-10-CM

## 2019-04-04 DIAGNOSIS — M24.852 SNAPPING HIP SYNDROME, LEFT: Primary | ICD-10-CM

## 2019-04-04 PROCEDURE — 99213 OFFICE O/P EST LOW 20 MIN: CPT | Performed by: ORTHOPAEDIC SURGERY

## 2019-04-04 PROCEDURE — 72072 X-RAY EXAM THORAC SPINE 3VWS: CPT

## 2019-04-04 PROCEDURE — 72110 X-RAY EXAM L-2 SPINE 4/>VWS: CPT

## 2019-07-09 ENCOUNTER — OFFICE VISIT (OUTPATIENT)
Dept: URGENT CARE | Facility: CLINIC | Age: 19
End: 2019-07-09
Payer: COMMERCIAL

## 2019-07-09 ENCOUNTER — APPOINTMENT (OUTPATIENT)
Dept: RADIOLOGY | Facility: CLINIC | Age: 19
End: 2019-07-09
Payer: COMMERCIAL

## 2019-07-09 VITALS
OXYGEN SATURATION: 100 % | DIASTOLIC BLOOD PRESSURE: 90 MMHG | SYSTOLIC BLOOD PRESSURE: 146 MMHG | HEART RATE: 95 BPM | RESPIRATION RATE: 16 BRPM | WEIGHT: 293 LBS | TEMPERATURE: 99.6 F | BODY MASS INDEX: 42.72 KG/M2

## 2019-07-09 DIAGNOSIS — M25.561 ACUTE PAIN OF RIGHT KNEE: ICD-10-CM

## 2019-07-09 DIAGNOSIS — M25.561 ACUTE PAIN OF RIGHT KNEE: Primary | ICD-10-CM

## 2019-07-09 PROCEDURE — 99214 OFFICE O/P EST MOD 30 MIN: CPT | Performed by: PHYSICIAN ASSISTANT

## 2019-07-09 PROCEDURE — 73562 X-RAY EXAM OF KNEE 3: CPT

## 2019-07-09 NOTE — PATIENT INSTRUCTIONS
Right knee pain:   -There is no obvious sign of dislocation or fracture on X-ray  Awaiting official read  The etiology of the patients pain is unclear  We did discuss the diagnosis of fibromyalgia as she has intermittent back, left hip and joint pains and states that she has "tender spots"  We discussed that she should discuss this with her PCP  We also discussed IT band syndrome    -Wear a knee brace for comfort and support  Will ace wrap today  -Ice the area for 20 minutes 3-4 times a day  -Elevate the area and rest   -You can take your mobic for the pain   -Avoid strenuous activity/sports or gym until your symptoms improve  -Follow up with Dr Saldivar For further evaluation and management of the knee pain

## 2019-07-09 NOTE — LETTER
July 9, 2019     Patient: Edward Kemp   YOB: 2000   Date of Visit: 7/9/2019       To Whom It May Concern:    Edward Kemp was seen in my office today 7/9/2019  If you have any questions or concerns, please don't hesitate to call           Sincerely,        Lita Briones PA-C    CC: No Recipients

## 2019-07-09 NOTE — PROGRESS NOTES
[facility]        NAME: Margaret Evans is a 22 y o  female  : 2000    MRN: [id number]  DATE: 2019  TIME: 10:53 AM    Assessment and Plan   Acute pain of right knee [M25 561]  1  Acute pain of right knee  XR knee 3 vw right non injury         Patient Instructions   Right knee pain:   -There is no obvious sign of dislocation or fracture on X-ray  Awaiting official read  The etiology of the patients pain is unclear  We did discuss the diagnosis of fibromyalgia as she has intermittent back, left hip and joint pains and states that she has "tender spots"  We discussed that she should discuss this with her PCP  We also discussed IT band syndrome    -Wear a knee brace for comfort and support  Will ace wrap today  -Ice the area for 20 minutes 3-4 times a day  -Elevate the area and rest   -You can take your mobic for the pain   -Avoid strenuous activity/sports or gym until your symptoms improve  -Follow up with Dr Saldivar For further evaluation and management of the knee pain  Follow up with PCP in 3-5 days  Proceed to  ER if symptoms worsen  Chief Complaint     Chief Complaint   Patient presents with    Pain     pt presents with pain of the right knee lateral side; pt denies injury; states there was swelling at sight; History of Present Illness       The patient presents today for acute right knee pain  The patient states that the pain is over the lateral joint line  She denies trauma or known injury, there was no inciting event  She states that she has mild edema over the lateral aspect of the knee  She states that there is pain with ambulation and squatting  She denies mechanical abnormalities like popping, clicking or catching  She states that she does have full ROM but that there is pain with extension  She states that when she was in second grade she had valgus knee alignment of her knees and had corrective shoes to alleviate the problem   She states that she has had knee issues "my whole life" she states that she has been tested for lyme disease multiple times  She states that she has not had knee pain in 4-5 years  She states that she has a prescription for mobic and flexeril for intermittent low back pain  She also notes a recent diagnosis of "snapping hip syndrome" and has undergone PT  She has been seen by Nallely López and Shahida Clifton for management  She denies fever, chills, erythema, ecchymosis, rash  She states that she has been tested for RA, SLA and autoimmune diseases in the past which were negative  Review of Systems   Review of Systems   Constitutional: Negative for activity change, appetite change, chills, fatigue and fever  Musculoskeletal: Positive for arthralgias, gait problem and joint swelling  Negative for back pain, myalgias, neck pain and neck stiffness  Skin: Negative for color change, pallor, rash and wound  Allergic/Immunologic: Negative for immunocompromised state  Neurological: Negative for dizziness, weakness, light-headedness and numbness  Hematological: Does not bruise/bleed easily           Current Medications       Current Outpatient Medications:     cyclobenzaprine (FLEXERIL) 10 mg tablet, Take 1 tablet (10 mg total) by mouth 3 (three) times a day as needed for muscle spasms for up to 20 doses, Disp: 20 tablet, Rfl: 0    meloxicam (MOBIC) 15 mg tablet, Take 1 tablet (15 mg total) by mouth daily as needed for moderate pain, Disp: 30 tablet, Rfl: 0    Current Allergies     Allergies as of 07/09/2019    (No Known Allergies)            The following portions of the patient's history were reviewed and updated as appropriate: allergies, current medications, past family history, past medical history, past social history, past surgical history and problem list      Past Medical History:   Diagnosis Date    Congenital pes planus of both feet     Resolved 1/312017     Difficulty walking     Resolved 1/31/2017     Mass of hand, left Resolved 1/31/2017     Obesity 8/11/2016    Pain     chronic back pain       Past Surgical History:   Procedure Laterality Date    FL INJECTION LEFT HIP (ARTHROGRAM)  11/30/2018    HAND SURGERY      HAND SURGERY      WISDOM TOOTH EXTRACTION         Family History   Problem Relation Age of Onset    Hypertension Mother     Heart disease Mother     Other Mother     Heart disease Father     Hypertension Father     Diabetes type II Maternal Grandfather     Diabetes type II Paternal Grandmother     Arthritis Family          Medications have been verified  Objective   /90   Pulse 95   Temp 99 6 °F (37 6 °C)   Resp 16   Wt (!) 137 kg (302 lb)   LMP 06/29/2019   SpO2 100%   BMI 42 72 kg/m²        Physical Exam     Physical Exam   Constitutional: She appears well-developed and well-nourished  No distress  Cardiovascular: Normal rate, regular rhythm and normal heart sounds  Pulmonary/Chest: Effort normal and breath sounds normal    Musculoskeletal:        Right knee: She exhibits swelling  She exhibits normal range of motion, no effusion, no ecchymosis, no deformity, no laceration, no erythema, normal alignment, no LCL laxity, no bony tenderness, normal meniscus and no MCL laxity  Tenderness found  Lateral joint line and LCL tenderness noted  No medial joint line, no MCL and no patellar tendon tenderness noted  The patient has full ROM of the knee but states that there is pain with extension  She states that there is pain with forced flexion and extension over the lateral and medial joint line  There is also tenderness and tightness over the distal attachment of the IT band  There is mild diffuse edema of the knee when compared to the left side  No erythema or ecchymosis  Strength is 5/5 of the lower extremities bilaterally  Patient observed with an abnormal gait-limping  Neurological: She has normal strength and normal reflexes  No sensory deficit  She exhibits normal muscle tone  Gait abnormal    Reflex Scores:       Patellar reflexes are 2+ on the right side and 2+ on the left side  Skin: Skin is warm, dry and intact  Capillary refill takes less than 2 seconds  No bruising and no ecchymosis noted  She is not diaphoretic  No erythema  Psychiatric: She has a normal mood and affect  Her behavior is normal    Nursing note and vitals reviewed

## 2019-07-10 ENCOUNTER — OFFICE VISIT (OUTPATIENT)
Dept: FAMILY MEDICINE CLINIC | Facility: CLINIC | Age: 19
End: 2019-07-10
Payer: COMMERCIAL

## 2019-07-10 VITALS
BODY MASS INDEX: 41.02 KG/M2 | RESPIRATION RATE: 18 BRPM | HEIGHT: 71 IN | HEART RATE: 114 BPM | WEIGHT: 293 LBS | DIASTOLIC BLOOD PRESSURE: 90 MMHG | SYSTOLIC BLOOD PRESSURE: 160 MMHG | TEMPERATURE: 97.9 F

## 2019-07-10 DIAGNOSIS — M25.561 RIGHT KNEE PAIN, UNSPECIFIED CHRONICITY: ICD-10-CM

## 2019-07-10 DIAGNOSIS — M25.50 MULTIPLE JOINT PAIN: ICD-10-CM

## 2019-07-10 DIAGNOSIS — I10 ESSENTIAL HYPERTENSION: Primary | ICD-10-CM

## 2019-07-10 DIAGNOSIS — E66.9 OBESITY (BMI 35.0-39.9 WITHOUT COMORBIDITY): ICD-10-CM

## 2019-07-10 DIAGNOSIS — R00.0 TACHYCARDIA: ICD-10-CM

## 2019-07-10 PROCEDURE — 99214 OFFICE O/P EST MOD 30 MIN: CPT | Performed by: FAMILY MEDICINE

## 2019-07-10 NOTE — PROGRESS NOTES
Cathie Strickland 2000 female MRN: [id number]    FAMILY PRACTICE OFFICE VISIT  [facility] - [address]      ASSESSMENT/PLAN  Cathie Strickland is a 22 y o  female presents to the office for    Diagnoses and all orders for this visit:    Essential hypertension  -     Comprehensive metabolic panel; Future  -     CBC and differential; Future  -     Comprehensive metabolic panel  -     CBC and differential    Multiple joint pain  -     Ambulatory referral to Chiropractic; Future  -     Ambulatory referral to Rheumatology; Future  -     Lyme Antibody Profile with reflex to WB; Future  -     TSH, 3rd generation with Free T4 reflex; Future  -     KEELY Screen w/ Reflex to Titer/Pattern; Future  -     Lyme Antibody Profile with reflex to WB  -     TSH, 3rd generation with Free T4 reflex    Right knee pain, unspecified chronicity  -     Ambulatory referral to Chiropractic; Future  -     Ambulatory referral to Rheumatology; Future  -     TSH, 3rd generation with Free T4 reflex; Future  -     KEELY Screen w/ Reflex to Titer/Pattern; Future  -     TSH, 3rd generation with Free T4 reflex    Tachycardia  -     CBC and differential; Future  -     CBC and differential    Obesity (BMI 35 0-39 9 without comorbidity)  -     Lipid panel; Future  -     Lipid panel       Hypertension:  New diagnosis for this patient  Education given on low salt intake  Education pamphlet given  Recommend the patient to take weekly blood pressures and schedule physical so that we can follow up on this  Education given that she might need medications in the future  Would like the patient to start exercising and dieting appropriately  She is seems that she is exercising regularly but would focus more on cardio    Multiple joint pain with an acute right knee pain    Mild swelling appreciated on exam   Recommend her to see Ortho for IT band tightness however I do believe that the patient is demonstrating signs of fibromyalgia however would like her to be diagnosed by a rheumatologist   If she agrees will start the patient likely on Cymbalta  Discussed with patient in great detail  I did send out an KEELY in the past but no results ever came through    [address] will repeat her TSH levels    BMI Counseling: Body mass index is 43 kg/m²  Discussed the patient's BMI with her  The BMI is above average  BMI counseling and education was provided to the patient  Exercise recommendations include moderate aerobic physical activity for 150 minutes/week  Low salt diet      Disposition: Return to the office in 1 week if symptoms worsen  No future appointments  SUBJECTIVE  CC: Follow-up (knee pain )      HPI:  Fabián Kent is a 22 y o  female who presents for an acute appointment  She has had multiple years of tender joints that resolve within 24-48 hours  Patient states that she does appreciate joint stiffness in the morning and it improves as the days goes on  Patient states that she be doing great for couple weeks and then has flares for 1 week and then it resolves  She states that the swelling in her knee only lasted less than 24 hours is now currently improving  Mobic is not having any relief  Patient states that the joints that he usually affected her her lower back, bilateral hip, bilateral knee  She has a history of sports and multiple visits secondary to swelling of joints  Mom has a family history of cardiolipin positive but no autoimmune disease  Has been tested for Lyme multiple times always negative  Patient states that her blood pressure was noted to be elevated in urgent care as well at home her BP is are normal   Does not write them down  Does limit her salt intake  Understands that her weight needs improvement  Review of Systems   Constitutional: Negative for activity change, appetite change, chills, fatigue and fever  HENT: Negative for congestion      Respiratory: Negative for cough, chest tightness and shortness of breath  Cardiovascular: Negative for chest pain and leg swelling  Gastrointestinal: Negative for abdominal distention, abdominal pain, constipation, diarrhea, nausea and vomiting  Musculoskeletal: Positive for arthralgias, back pain and joint swelling  All other systems reviewed and are negative  Historical Information   The patient history was reviewed as follows:  Past Medical History:   Diagnosis Date    Congenital pes planus of both feet     Resolved 1/312017     Difficulty walking     Resolved 1/31/2017     Mass of hand, left     Resolved 1/31/2017     Obesity 8/11/2016    Pain     chronic back pain         Medications:     Current Outpatient Medications:     cyclobenzaprine (FLEXERIL) 10 mg tablet, Take 1 tablet (10 mg total) by mouth 3 (three) times a day as needed for muscle spasms for up to 20 doses, Disp: 20 tablet, Rfl: 0    meloxicam (MOBIC) 15 mg tablet, Take 1 tablet (15 mg total) by mouth daily as needed for moderate pain, Disp: 30 tablet, Rfl: 0    Not on File    OBJECTIVE  Vitals:   Vitals:    07/10/19 0925   BP: 160/90   BP Location: Left arm   Patient Position: Sitting   Cuff Size: Extra-Large   Pulse: (!) 114   Resp: 18   Temp: 97 9 °F (36 6 °C)   Weight: (!) 138 kg (304 lb)   Height: 5' 10 5" (1 791 m)         Physical Exam   Constitutional: She is oriented to person, place, and time  Vital signs are normal  She appears well-developed and well-nourished  HENT:   Head: Normocephalic and atraumatic  Eyes: Pupils are equal, round, and reactive to light  Conjunctivae and EOM are normal    Neck: Normal range of motion  Neck supple  Cardiovascular: Normal rate, regular rhythm, S1 normal, S2 normal, normal heart sounds and intact distal pulses  No murmur heard  Pulmonary/Chest: Effort normal and breath sounds normal  No respiratory distress  She has no wheezes  Musculoskeletal: Normal range of motion  She exhibits no edema          Right knee: She exhibits swelling (Mild swelling appreciated)  Neurological: She is alert and oriented to person, place, and time  She has normal strength  Skin: Skin is warm  Psychiatric: She has a normal mood and affect  Her speech is normal and behavior is normal  Judgment and thought content normal    Vitals reviewed                   aRmone Lyon MD,   [facility]  7/10/2019

## 2019-07-10 NOTE — PATIENT INSTRUCTIONS

## 2019-07-17 ENCOUNTER — TELEPHONE (OUTPATIENT)
Dept: RHEUMATOLOGY | Facility: CLINIC | Age: 19
End: 2019-07-17

## 2019-07-17 ENCOUNTER — TELEPHONE (OUTPATIENT)
Dept: FAMILY MEDICINE CLINIC | Facility: CLINIC | Age: 19
End: 2019-07-17

## 2019-07-17 NOTE — TELEPHONE ENCOUNTER
Luz is being referred to Rheumatology from her PCP  She is scheduled for 07/29 but she will be on vacation  Her mother is calling to ask if she can be squeezed onto the schedule after the first week of August but before the end of August because she goes back to college   Her mother can be reached at #[phone]

## 2019-07-17 NOTE — TELEPHONE ENCOUNTER
Pt was informed that there is nothing we can do to get her appt closer I did tell her that she could call member services on the back of her card to find somewhere else she can go but unfortunately if she did not keep the July appt she will have to wait until Oct

## 2019-07-17 NOTE — TELEPHONE ENCOUNTER
Patient of Dr Linda Duarte was told to follow up with rheumatologist for possible fibromyalgia  She called Dr Rodo Bauer office and they gave her appointment 7/29, but patient is away on vacation then  They told her next available appointment is 10/15 in both [place] and [place] offices  Please advise is there any way patient can be seen sooner? Patient is aware that Dr Linda Duarte is out on leave

## 2019-07-18 NOTE — TELEPHONE ENCOUNTER
No, please do not use same day slots  If patient will be away for college maybe it would be more beneficial for her to establish care closer to there, as otherwise she will not be able to follow up with me  Thanks

## 2019-07-18 NOTE — TELEPHONE ENCOUNTER
Unfortunately, the only appointment slots are samedays on Thursday Aug 8th, 15th, or 22nd  Dr Harish Mckoy would it be okay to use any of these slots?

## 2019-07-19 LAB
ALBUMIN SERPL-MCNC: 4.2 G/DL (ref 3.5–5.5)
ALBUMIN/GLOB SERPL: 1.6 {RATIO} (ref 1.2–2.2)
ALP SERPL-CCNC: 72 IU/L (ref 39–117)
ALT SERPL-CCNC: 16 IU/L (ref 0–32)
ANA SPECKLED TITR SER: ABNORMAL {TITER}
ANA TITR SER IF: POSITIVE {TITER}
AST SERPL-CCNC: 22 IU/L (ref 0–40)
B BURGDOR IGG+IGM SER-ACNC: <0.91 ISR (ref 0–0.9)
BASOPHILS # BLD AUTO: 0 X10E3/UL (ref 0–0.2)
BASOPHILS NFR BLD AUTO: 0 %
BILIRUB SERPL-MCNC: 0.2 MG/DL (ref 0–1.2)
BUN SERPL-MCNC: 15 MG/DL (ref 6–20)
BUN/CREAT SERPL: 15 (ref 9–23)
CALCIUM SERPL-MCNC: 9 MG/DL (ref 8.7–10.2)
CHLORIDE SERPL-SCNC: 103 MMOL/L (ref 96–106)
CHOLEST SERPL-MCNC: 206 MG/DL (ref 100–199)
CHOLEST/HDLC SERPL: 4.1 RATIO (ref 0–4.4)
CO2 SERPL-SCNC: 20 MMOL/L (ref 20–29)
CREAT SERPL-MCNC: 0.99 MG/DL (ref 0.57–1)
EOSINOPHIL # BLD AUTO: 0.1 X10E3/UL (ref 0–0.4)
EOSINOPHIL NFR BLD AUTO: 1 %
ERYTHROCYTE [DISTWIDTH] IN BLOOD BY AUTOMATED COUNT: 13.5 % (ref 12.3–15.4)
GLOBULIN SER-MCNC: 2.7 G/DL (ref 1.5–4.5)
GLUCOSE SERPL-MCNC: 81 MG/DL (ref 65–99)
HCT VFR BLD AUTO: 36.8 % (ref 34–46.6)
HDLC SERPL-MCNC: 50 MG/DL
HGB BLD-MCNC: 11.8 G/DL (ref 11.1–15.9)
IMM GRANULOCYTES # BLD: 0 X10E3/UL (ref 0–0.1)
IMM GRANULOCYTES NFR BLD: 0 %
LDLC SERPL CALC-MCNC: 133 MG/DL (ref 0–99)
LYMPHOCYTES # BLD AUTO: 1.7 X10E3/UL (ref 0.7–3.1)
LYMPHOCYTES NFR BLD AUTO: 24 %
MCH RBC QN AUTO: 26.2 PG (ref 26.6–33)
MCHC RBC AUTO-ENTMCNC: 32.1 G/DL (ref 31.5–35.7)
MCV RBC AUTO: 82 FL (ref 79–97)
MICRODELETION SYND BLD/T FISH: NORMAL
MONOCYTES # BLD AUTO: 0.4 X10E3/UL (ref 0.1–0.9)
MONOCYTES NFR BLD AUTO: 5 %
NEUTROPHILS # BLD AUTO: 4.9 X10E3/UL (ref 1.4–7)
NEUTROPHILS NFR BLD AUTO: 70 %
PLATELET # BLD AUTO: 323 X10E3/UL (ref 150–450)
POTASSIUM SERPL-SCNC: 4.5 MMOL/L (ref 3.5–5.2)
PROT SERPL-MCNC: 6.9 G/DL (ref 6–8.5)
RBC # BLD AUTO: 4.51 X10E6/UL (ref 3.77–5.28)
SL AMB EGFR AFRICAN AMERICAN: 94 ML/MIN/1.73
SL AMB EGFR NON AFRICAN AMERICAN: 82 ML/MIN/1.73
SL AMB NOTE:: ABNORMAL
SL AMB T4, FREE (DIRECT): 1.23 NG/DL (ref 0.82–1.77)
SL AMB VLDL CHOLESTEROL CALC: 23 MG/DL (ref 5–40)
SODIUM SERPL-SCNC: 138 MMOL/L (ref 134–144)
TRIGL SERPL-MCNC: 113 MG/DL (ref 0–149)
TSH SERPL DL<=0.005 MIU/L-ACNC: 5.66 UIU/ML (ref 0.45–4.5)
WBC # BLD AUTO: 7.1 X10E3/UL (ref 3.4–10.8)

## 2019-07-20 ENCOUNTER — TELEPHONE (OUTPATIENT)
Dept: FAMILY MEDICINE CLINIC | Facility: CLINIC | Age: 19
End: 2019-07-20

## 2019-07-20 NOTE — TELEPHONE ENCOUNTER
Labs (cholesterol) is borderline  Improve diet  Cbc, bmp, tsh is ok  She has ov with rheum on 7/29 who will review other labs

## 2019-07-31 DIAGNOSIS — M54.50 LUMBAR BACK PAIN: ICD-10-CM

## 2019-07-31 DIAGNOSIS — M54.9 ACUTE BILATERAL BACK PAIN, UNSPECIFIED BACK LOCATION: ICD-10-CM

## 2019-07-31 RX ORDER — MELOXICAM 15 MG/1
15 TABLET ORAL DAILY PRN
Qty: 30 TABLET | Refills: 0 | Status: SHIPPED | OUTPATIENT
Start: 2019-07-31 | End: 2019-09-05 | Stop reason: SDUPTHER

## 2019-07-31 RX ORDER — CYCLOBENZAPRINE HCL 10 MG
10 TABLET ORAL 3 TIMES DAILY PRN
Qty: 20 TABLET | Refills: 0 | Status: SHIPPED | OUTPATIENT
Start: 2019-07-31 | End: 2019-08-07 | Stop reason: ALTCHOICE

## 2019-07-31 NOTE — TELEPHONE ENCOUNTER
[place]   Please send to [facility] [place]) [place] and call when done

## 2019-08-06 ENCOUNTER — TELEPHONE (OUTPATIENT)
Dept: FAMILY MEDICINE CLINIC | Facility: CLINIC | Age: 19
End: 2019-08-06

## 2019-08-06 NOTE — TELEPHONE ENCOUNTER
Dr Kei Edge,     Patient called this morning to let you know that her pain has been bad and she had to call off of work again today  Unfortunately, she doesn't have her rheumatoid appointment until Friday, 8/9 and she doesn't know what to do with work until then  She won't be able to go into work with her condition  Please advise her on what to do for the next few days until her appointment   TY

## 2019-08-07 ENCOUNTER — OFFICE VISIT (OUTPATIENT)
Dept: FAMILY MEDICINE CLINIC | Facility: CLINIC | Age: 19
End: 2019-08-07
Payer: COMMERCIAL

## 2019-08-07 VITALS
DIASTOLIC BLOOD PRESSURE: 84 MMHG | HEIGHT: 71 IN | RESPIRATION RATE: 16 BRPM | TEMPERATURE: 98.6 F | WEIGHT: 293 LBS | HEART RATE: 86 BPM | SYSTOLIC BLOOD PRESSURE: 124 MMHG | BODY MASS INDEX: 41.02 KG/M2

## 2019-08-07 DIAGNOSIS — G89.29 CHRONIC BILATERAL LOW BACK PAIN, WITH SCIATICA PRESENCE UNSPECIFIED: ICD-10-CM

## 2019-08-07 DIAGNOSIS — M54.5 CHRONIC BILATERAL LOW BACK PAIN, WITH SCIATICA PRESENCE UNSPECIFIED: ICD-10-CM

## 2019-08-07 PROCEDURE — 1036F TOBACCO NON-USER: CPT | Performed by: FAMILY MEDICINE

## 2019-08-07 PROCEDURE — 99213 OFFICE O/P EST LOW 20 MIN: CPT | Performed by: FAMILY MEDICINE

## 2019-08-07 PROCEDURE — 3008F BODY MASS INDEX DOCD: CPT | Performed by: FAMILY MEDICINE

## 2019-08-07 RX ORDER — BACLOFEN 5 MG/1
5 TABLET ORAL 3 TIMES DAILY PRN
Qty: 21 TABLET | Refills: 0 | Status: SHIPPED | OUTPATIENT
Start: 2019-08-07 | End: 2020-02-12

## 2019-08-07 NOTE — PROGRESS NOTES
Assessment/Plan:     Diagnoses and all orders for this visit:    Chronic bilateral low back pain, with sciatica presence unspecified  -     Baclofen 5 MG TABS; Take 5 mg by mouth 3 (three) times a day as needed (back pain/tightness)    Will try baclofen instead of flexeril as flexeril is not working well for patient  Advised to follow up with rheumatologists even tightness and pain on Friday  Patient advised heating pad  Patient agrees with plan  Side effects reviewed  Subjective:      Patient ID: Delmy Hoffman is a 22 y o  female  24-year-old female with history of multiple joint pains presents due to back pain and knee pain along with shoulder pain  Patient was supposed to see from a tolerated on Monday but when she got there appointment was canceled and now is rescheduled for Friday  She does have her blood work that she needs for their  She states that yesterday she was unable to go to work because her back pain was a 9/10 along with tightness not only in her back, shoulder, and right knee  She did take the Mobic and Flexeril but she does not to take more than 1-2 pills a day of the Flexeril  She states that it makes her very drowsy  Mobic was taken last night as she was having pain and was unable to sleep  Today pain is 5/10  She denies any trauma to the area  There was concern for fibromyalgia and therefore wanted her to see rheumatologist  No numbness or tingling  The following portions of the patient's history were reviewed and updated as appropriate: allergies, current medications, past family history, past medical history, past social history, past surgical history and problem list     Review of Systems   Constitutional: Negative for appetite change and fever  HENT: Negative for ear pain and sore throat  Eyes: Negative for visual disturbance  Respiratory: Negative for shortness of breath  Cardiovascular: Negative for chest pain and leg swelling     Gastrointestinal: Negative for abdominal pain, diarrhea, nausea and vomiting  Musculoskeletal: Positive for arthralgias, back pain and joint swelling  Negative for gait problem, neck pain and neck stiffness  Skin: Negative for color change  Neurological: Negative for dizziness, tremors, light-headedness and headaches  Psychiatric/Behavioral: Negative for agitation and behavioral problems  Objective:      /84 (BP Location: Left arm, Patient Position: Sitting, Cuff Size: Standard)   Pulse 86   Temp 98 6 °F (37 °C)   Resp 16   Ht 5' 10 5" (1 791 m)   Wt 136 kg (299 lb 3 2 oz)   BMI 42 32 kg/m²          Physical Exam   Constitutional: She is oriented to person, place, and time  She appears well-developed and well-nourished  No distress  HENT:   Head: Normocephalic and atraumatic  Nose: Nose normal    Eyes: EOM are normal  Right eye exhibits no discharge  Left eye exhibits no discharge  Neck: Normal range of motion  Neck supple  Cardiovascular: Normal rate, regular rhythm, normal heart sounds and intact distal pulses  No murmur heard  Pulmonary/Chest: Effort normal and breath sounds normal  No respiratory distress  Abdominal: Soft  Bowel sounds are normal  She exhibits no distension  There is no tenderness  Musculoskeletal: Normal range of motion  She exhibits no edema  Shoulder: ROM intact, sensation intact, patient feels tightness on palpation     Lower back: tightness on palpation to paraspinal R>L, no discoloration, negative straight leg test, sensation intact, +2 Pulses     Right knee: ROM intact, no swelling noted, negative drawer, lachman, vargus  Neurological: She is alert and oriented to person, place, and time  Skin: Skin is warm  Psychiatric: She has a normal mood and affect   Her behavior is normal

## 2019-08-07 NOTE — LETTER
August 7, 2019     Patient: Kenia Rushing   YOB: 2000   Date of Visit: 8/7/2019       To Whom it May Concern:    Kenia Rushing is under my professional care  She was seen in my office on 8/7/2019  She may return to work on 8/8/19  Excuse from 8/6-8/7    If you have any questions or concerns, please don't hesitate to call           Sincerely,          Jina Ospina MD        CC: No Recipients

## 2019-09-05 DIAGNOSIS — M54.50 LUMBAR BACK PAIN: ICD-10-CM

## 2019-09-05 RX ORDER — MELOXICAM 15 MG/1
15 TABLET ORAL DAILY PRN
Qty: 30 TABLET | Refills: 0 | Status: SHIPPED | OUTPATIENT
Start: 2019-09-05 | End: 2020-10-20

## 2020-01-21 ENCOUNTER — OFFICE VISIT (OUTPATIENT)
Dept: FAMILY MEDICINE CLINIC | Facility: CLINIC | Age: 20
End: 2020-01-21
Payer: COMMERCIAL

## 2020-01-21 VITALS
TEMPERATURE: 97.6 F | BODY MASS INDEX: 39.34 KG/M2 | RESPIRATION RATE: 18 BRPM | DIASTOLIC BLOOD PRESSURE: 90 MMHG | WEIGHT: 281 LBS | SYSTOLIC BLOOD PRESSURE: 134 MMHG | HEIGHT: 71 IN | HEART RATE: 104 BPM

## 2020-01-21 DIAGNOSIS — Z20.828 EXPOSURE TO MONONUCLEOSIS SYNDROME: Primary | ICD-10-CM

## 2020-01-21 DIAGNOSIS — J06.9 UPPER RESPIRATORY TRACT INFECTION, UNSPECIFIED TYPE: ICD-10-CM

## 2020-01-21 PROCEDURE — 3008F BODY MASS INDEX DOCD: CPT | Performed by: FAMILY MEDICINE

## 2020-01-21 PROCEDURE — 3725F SCREEN DEPRESSION PERFORMED: CPT | Performed by: FAMILY MEDICINE

## 2020-01-21 PROCEDURE — 99213 OFFICE O/P EST LOW 20 MIN: CPT | Performed by: FAMILY MEDICINE

## 2020-01-21 RX ORDER — NORGESTIMATE AND ETHINYL ESTRADIOL 0.25-0.035
1 KIT ORAL DAILY
COMMUNITY
End: 2020-05-04 | Stop reason: SDUPTHER

## 2020-01-21 NOTE — PROGRESS NOTES
Favio Rodriguez 2000 female MRN: [id number]    FAMILY PRACTICE OFFICE VISIT  [facility] - [address]      ASSESSMENT/PLAN  Favio Rodriguez is a 23 y o  female presents to the office for    1  Exposure to mononucleosis syndrome  Given that the patient recently had an upper respiratory infection that has been on and off would like to be sure that the patient does not have mono especially in the setting of her sister testing positive this recent we can  - EBV acute panel; Future  - EBV acute panel     2  Upper respiratory infection:  Likely viral at this time continue supportive care     Return to the office if positive testing    No future appointments  SUBJECTIVE  CC: Fatigue (requesting order to be tested for mono ) and patient encouraged to schedule GYN exam and PE      HPI:  Favio Rodriguez is a 23 y o  female who presents for an acute appointment  Recently her sister was diagnosed with mono  Patient states that for the last couple months she has had on and off fatigue with cold symptoms coughing sneezing and sore throat  She states that currently her symptoms are at [facility] and she is only suffering from a headache and fatigue  Patient denies any recent sickness with mono/ever being exposed to mono  Denies any left upper quadrant pain  Review of Systems   Constitutional: Positive for fatigue  Negative for activity change, appetite change, chills and fever  HENT: Positive for congestion  Respiratory: Positive for cough  Negative for chest tightness and shortness of breath  Cardiovascular: Negative for chest pain and leg swelling  Gastrointestinal: Negative for abdominal distention, abdominal pain, constipation, diarrhea, nausea and vomiting  Musculoskeletal: Positive for arthralgias and myalgias  At baseline   All other systems reviewed and are negative        Historical Information   The patient history was reviewed as follows:  Past Medical History:   Diagnosis Date    Congenital pes planus of both feet     Resolved 1/312017     Difficulty walking     Resolved 1/31/2017     Mass of hand, left     Resolved 1/31/2017     Obesity 8/11/2016    Pain     chronic back pain         Medications:     Current Outpatient Medications:     norgestimate-ethinyl estradiol (ORTHO-CYCLEN) 0 25-35 MG-MCG per tablet, Take 1 tablet by mouth daily, Disp: , Rfl:     Baclofen 5 MG TABS, Take 5 mg by mouth 3 (three) times a day as needed (back pain/tightness) (Patient not taking: Reported on 1/21/2020), Disp: 21 tablet, Rfl: 0    meloxicam (MOBIC) 15 mg tablet, Take 1 tablet (15 mg total) by mouth daily as needed for moderate pain (Patient not taking: Reported on 1/21/2020), Disp: 30 tablet, Rfl: 0    No Known Allergies    OBJECTIVE  Vitals:   Vitals:    01/21/20 0916   BP: 134/90   BP Location: Left arm   Patient Position: Sitting   Cuff Size: Large   Pulse: 104   Resp: 18   Temp: 97 6 °F (36 4 °C)   TempSrc: Tympanic   Weight: 127 kg (281 lb)   Height: 5' 10 5" (1 791 m)         Physical Exam   Constitutional: She is oriented to person, place, and time  Vital signs are normal  She appears well-developed and well-nourished  HENT:   Head: Normocephalic and atraumatic  Mouth/Throat: Uvula is midline and mucous membranes are normal  Posterior oropharyngeal erythema present  Eyes: Pupils are equal, round, and reactive to light  Conjunctivae and EOM are normal    Neck: Normal range of motion  Neck supple  Cardiovascular: Normal rate, regular rhythm, S1 normal, S2 normal, normal heart sounds and intact distal pulses  No murmur heard  Pulmonary/Chest: Effort normal and breath sounds normal  No respiratory distress  She has no wheezes  Musculoskeletal: Normal range of motion  She exhibits no edema  Neurological: She is alert and oriented to person, place, and time  She has normal strength  Skin: Skin is warm  Psychiatric: She has a normal mood and affect  Her speech is normal and behavior is normal  Judgment and thought content normal    Vitals reviewed                   Micheal Goltz, MD,   [facility]  1/21/2020

## 2020-01-22 LAB
EBV EA IGG SER-ACNC: <9 U/ML (ref 0–8.9)
EBV NA IGG SER IA-ACNC: 40.4 U/ML (ref 0–17.9)
EBV PATRN SPEC IB-IMP: ABNORMAL
EBV VCA IGG SER IA-ACNC: 348 U/ML (ref 0–17.9)
EBV VCA IGM SER IA-ACNC: <36 U/ML (ref 0–35.9)

## 2020-02-12 ENCOUNTER — OFFICE VISIT (OUTPATIENT)
Dept: FAMILY MEDICINE CLINIC | Facility: CLINIC | Age: 20
End: 2020-02-12
Payer: COMMERCIAL

## 2020-02-12 VITALS
WEIGHT: 278.6 LBS | BODY MASS INDEX: 39 KG/M2 | HEIGHT: 71 IN | RESPIRATION RATE: 16 BRPM | TEMPERATURE: 98.4 F | DIASTOLIC BLOOD PRESSURE: 82 MMHG | HEART RATE: 100 BPM | SYSTOLIC BLOOD PRESSURE: 140 MMHG

## 2020-02-12 DIAGNOSIS — M62.838 MUSCLE SPASM: ICD-10-CM

## 2020-02-12 DIAGNOSIS — G47.00 INSOMNIA, UNSPECIFIED TYPE: Primary | ICD-10-CM

## 2020-02-12 PROCEDURE — 3079F DIAST BP 80-89 MM HG: CPT | Performed by: FAMILY MEDICINE

## 2020-02-12 PROCEDURE — 1036F TOBACCO NON-USER: CPT | Performed by: FAMILY MEDICINE

## 2020-02-12 PROCEDURE — 3077F SYST BP >= 140 MM HG: CPT | Performed by: FAMILY MEDICINE

## 2020-02-12 PROCEDURE — 99213 OFFICE O/P EST LOW 20 MIN: CPT | Performed by: FAMILY MEDICINE

## 2020-02-12 PROCEDURE — 3008F BODY MASS INDEX DOCD: CPT | Performed by: FAMILY MEDICINE

## 2020-02-12 RX ORDER — BACLOFEN 10 MG/1
10 TABLET ORAL 2 TIMES DAILY PRN
Qty: 30 TABLET | Refills: 0 | Status: SHIPPED | OUTPATIENT
Start: 2020-02-12 | End: 2020-03-10 | Stop reason: SDUPTHER

## 2020-02-12 RX ORDER — TRAZODONE HYDROCHLORIDE 50 MG/1
TABLET ORAL
Qty: 63 TABLET | Refills: 0 | Status: SHIPPED | OUTPATIENT
Start: 2020-02-12 | End: 2020-03-11

## 2020-02-12 NOTE — PROGRESS NOTES
Javad Turner 2000 female MRN: [id number]    FAMILY PRACTICE OFFICE VISIT  [facility] - [address]      ASSESSMENT/PLAN  Javad Turner is a 23 y o  female presents to the office for    Diagnoses and all orders for this visit:    Insomnia, unspecified type  -     traZODone (DESYREL) 50 mg tablet; Take 1 tablet (50 mg total) by mouth daily at bedtime for 3 days, THEN 2 tablets (100 mg total) daily at bedtime  Muscle spasm  -     baclofen 10 mg tablet; Take 1 tablet (10 mg total) by mouth 2 (two) times a day as needed for muscle spasms       Unfortunately this is a chronic condition of the severe muscle spasms this patient has had since childhood  Rheumatology and ortho evaluation demonstrated no significant abnormalities except for a diagnosis of snapping hip syndrome  Given the baclofen 5 mg showed no improvement will place her on 10 mg twice a day scheduled  Also will add on trazodone 50 mg with a goal of 100 mg to help with her insomnia  I did educate the patient that she is not to drive if she has not slept a solid 5-6 hours  I am worried about insomnia and sleeping conditions  Patient is to return back in 2 weeks for close follow-up         No future appointments  SUBJECTIVE  CC: Follow-up (back pain and not able to sleep )      HPI:  Javad Turner is a 23 y o  female who presents for an acute appointment  Patient states that she has had chronic lower back pain as well as multiple muscle spasms since her early teen years  She was just recently seen by rheumatology for an abnormal KEELY workup was negative for any autoimmune disease  Patient states that she was on Flexeril however made her feel drowsy for the next day and did not help with any muscle spasms  Patient states that she sees was seen here at our office and had baclofen ordered which has not helped her with any muscle spasms    Patient's boyfriend is in the room today and is very worried given that the patient has not slept in 3 days  He was worried about her driving here to this office  Patient has tried over-the-counter melatonin and night medications with no relief    Review of Systems   Constitutional: Positive for fatigue  Negative for activity change, appetite change, chills and fever  HENT: Negative for congestion  Respiratory: Negative for cough, chest tightness and shortness of breath  Cardiovascular: Negative for chest pain and leg swelling  Gastrointestinal: Negative for abdominal distention, abdominal pain, constipation, diarrhea, nausea and vomiting  Musculoskeletal: Positive for back pain, gait problem and myalgias  Psychiatric/Behavioral: Positive for sleep disturbance  All other systems reviewed and are negative  Historical Information   The patient history was reviewed as follows:  Past Medical History:   Diagnosis Date    Congenital pes planus of both feet     Resolved 1/312017     Difficulty walking     Resolved 1/31/2017     Mass of hand, left     Resolved 1/31/2017     Obesity 8/11/2016    Pain     chronic back pain         Medications:     Current Outpatient Medications:     meloxicam (MOBIC) 15 mg tablet, Take 1 tablet (15 mg total) by mouth daily as needed for moderate pain, Disp: 30 tablet, Rfl: 0    norgestimate-ethinyl estradiol (ORTHO-CYCLEN) 0 25-35 MG-MCG per tablet, Take 1 tablet by mouth daily, Disp: , Rfl:     baclofen 10 mg tablet, Take 1 tablet (10 mg total) by mouth 2 (two) times a day as needed for muscle spasms, Disp: 30 tablet, Rfl: 0    traZODone (DESYREL) 50 mg tablet, Take 1 tablet (50 mg total) by mouth daily at bedtime for 3 days, THEN 2 tablets (100 mg total) daily at bedtime  , Disp: 63 tablet, Rfl: 0    No Known Allergies    OBJECTIVE  Vitals:   Vitals:    02/12/20 1126   BP: 140/82   Pulse: 100   Resp: 16   Temp: 98 4 °F (36 9 °C)   Weight: 126 kg (278 lb 9 6 oz)   Height: 5' 10 5" (1 791 m)         Physical Exam Constitutional: She is oriented to person, place, and time  Vital signs are normal  She appears well-developed and well-nourished  HENT:   Head: Normocephalic and atraumatic  Eyes: Pupils are equal, round, and reactive to light  Conjunctivae and EOM are normal    Neck: Normal range of motion  Neck supple  Cardiovascular: Normal rate, regular rhythm, S1 normal, S2 normal, normal heart sounds and intact distal pulses  No murmur heard  Pulmonary/Chest: Effort normal and breath sounds normal  No respiratory distress  She has no wheezes  Musculoskeletal: Normal range of motion  She exhibits no edema or tenderness (No tenderness of lumbar back pain today)  No muscle spasms appreciated on exam today   Neurological: She is alert and oriented to person, place, and time  She has normal strength  Skin: Skin is warm  Psychiatric: She has a normal mood and affect  Her speech is normal and behavior is normal  Judgment and thought content normal    Vitals reviewed                   Jo Schreiber MD,   [facility]  2/12/2020

## 2020-02-25 ENCOUNTER — OFFICE VISIT (OUTPATIENT)
Dept: FAMILY MEDICINE CLINIC | Facility: CLINIC | Age: 20
End: 2020-02-25
Payer: COMMERCIAL

## 2020-02-25 VITALS
SYSTOLIC BLOOD PRESSURE: 130 MMHG | RESPIRATION RATE: 16 BRPM | BODY MASS INDEX: 40.21 KG/M2 | DIASTOLIC BLOOD PRESSURE: 80 MMHG | HEART RATE: 112 BPM | TEMPERATURE: 98.7 F | WEIGHT: 287.2 LBS | HEIGHT: 71 IN

## 2020-02-25 DIAGNOSIS — G47.00 INSOMNIA, UNSPECIFIED TYPE: Primary | ICD-10-CM

## 2020-02-25 DIAGNOSIS — R03.0 ELEVATED BP WITHOUT DIAGNOSIS OF HYPERTENSION: ICD-10-CM

## 2020-02-25 DIAGNOSIS — R00.0 TACHYCARDIA: ICD-10-CM

## 2020-02-25 DIAGNOSIS — M62.838 MUSCLE SPASM: ICD-10-CM

## 2020-02-25 DIAGNOSIS — M79.7 FIBROMYALGIA: ICD-10-CM

## 2020-02-25 PROCEDURE — 3075F SYST BP GE 130 - 139MM HG: CPT | Performed by: FAMILY MEDICINE

## 2020-02-25 PROCEDURE — 1036F TOBACCO NON-USER: CPT | Performed by: FAMILY MEDICINE

## 2020-02-25 PROCEDURE — 99214 OFFICE O/P EST MOD 30 MIN: CPT | Performed by: FAMILY MEDICINE

## 2020-02-25 PROCEDURE — 3079F DIAST BP 80-89 MM HG: CPT | Performed by: FAMILY MEDICINE

## 2020-02-25 PROCEDURE — 3008F BODY MASS INDEX DOCD: CPT | Performed by: FAMILY MEDICINE

## 2020-02-25 NOTE — PROGRESS NOTES
Moe Scott 2000 female MRN: [id number]    FAMILY PRACTICE OFFICE VISIT  [facility] - [address]      ASSESSMENT/PLAN  Moe Scott is a 23 y o  female presents to the office for    Diagnoses and all orders for this visit:    Insomnia, unspecified type    Muscle spasm    Tachycardia    Elevated BP without diagnosis of hypertension    Fibromyalgia     I do believe that there is an improvement with patient's insomnia  Restlessness through her sleep might be her deep REM  Schedule baclofen 1 tablet in the morning and 1 tablet at 2-3 o'clock in the afternoon  Only take trazodone 100 mg at bedtime  Will repeat blood pressure next appointment would benefit from metoprolol to help with tachycardia and elevated BP   In future would benefit from affects her or Cymbalta for her fibromyalgia however at this time will focus on insomnia 1st  Return to office in 2 weeks         Future Appointments   Date Time Provider Digna Issa   3/10/2020  8:45 AM Roxana Sanderson  [facility]          SUBJECTIVE  CC: Follow-up (insomnia and muscle spasms , experiencing a restless sleep )      HPI:  Moe Scott is a 23 y o  female who presents for a follow-up for insomnia and muscle spasms  Patient states that she is sleeping better well taking trazodone but does have slight drowsiness for the 1st 30 minutes in the morning  Patient states that she is very restless at nighttime  However when speaking to her boyfriend who is in the room he states that she is restless only 2-3 times a night  And is sleeping a solid 8 hours compared to nothing  Given the restlessness she wakes up with soreness of her muscles  Patient states that she is always having tachycardia but does not have symptoms of palpitations    Diagnose with fibromyalgia per her rheumatologist however not started on any medications yet        Review of Systems   Constitutional: Negative for activity change, appetite change, chills, fatigue and fever  HENT: Negative for congestion  Respiratory: Negative for cough, chest tightness and shortness of breath  Cardiovascular: Negative for chest pain and leg swelling  Gastrointestinal: Negative for abdominal distention, abdominal pain, constipation, diarrhea, nausea and vomiting  Musculoskeletal: Positive for back pain  Psychiatric/Behavioral: Positive for sleep disturbance  The patient is nervous/anxious  All other systems reviewed and are negative  Historical Information   The patient history was reviewed as follows:  Past Medical History:   Diagnosis Date    Congenital pes planus of both feet     Resolved 1/312017     Difficulty walking     Resolved 1/31/2017     Mass of hand, left     Resolved 1/31/2017     Obesity 8/11/2016    Pain     chronic back pain         Medications:     Current Outpatient Medications:     baclofen 10 mg tablet, Take 1 tablet (10 mg total) by mouth 2 (two) times a day as needed for muscle spasms, Disp: 30 tablet, Rfl: 0    meloxicam (MOBIC) 15 mg tablet, Take 1 tablet (15 mg total) by mouth daily as needed for moderate pain, Disp: 30 tablet, Rfl: 0    norgestimate-ethinyl estradiol (ORTHO-CYCLEN) 0 25-35 MG-MCG per tablet, Take 1 tablet by mouth daily, Disp: , Rfl:     traZODone (DESYREL) 50 mg tablet, Take 1 tablet (50 mg total) by mouth daily at bedtime for 3 days, THEN 2 tablets (100 mg total) daily at bedtime  , Disp: 63 tablet, Rfl: 0    No Known Allergies    OBJECTIVE  Vitals:   Vitals:    02/25/20 0911   BP: 130/80   Pulse: (!) 112   Resp: 16   Temp: 98 7 °F (37 1 °C)   Weight: 130 kg (287 lb 3 2 oz)   Height: 5' 10 5" (1 791 m)         Physical Exam   Constitutional: She is oriented to person, place, and time  Vital signs are normal  She appears well-developed and well-nourished  HENT:   Head: Normocephalic and atraumatic  Eyes: Pupils are equal, round, and reactive to light   Conjunctivae and EOM are normal  Neck: Normal range of motion  Neck supple  Cardiovascular: Regular rhythm, S1 normal, S2 normal, normal heart sounds and intact distal pulses  Tachycardia present  No murmur heard  Pulmonary/Chest: Effort normal and breath sounds normal  No respiratory distress  She has no wheezes  Musculoskeletal: Normal range of motion  She exhibits no edema  Neurological: She is alert and oriented to person, place, and time  She has normal strength  Skin: Skin is warm  Psychiatric: She has a normal mood and affect  Her speech is normal and behavior is normal  Judgment and thought content normal    Vitals reviewed                   Yaya Edwards MD,   [facility]  2/25/2020

## 2020-02-28 ENCOUNTER — APPOINTMENT (OUTPATIENT)
Dept: RADIOLOGY | Facility: CLINIC | Age: 20
End: 2020-02-28
Payer: COMMERCIAL

## 2020-02-28 ENCOUNTER — OFFICE VISIT (OUTPATIENT)
Dept: OBGYN CLINIC | Facility: CLINIC | Age: 20
End: 2020-02-28
Payer: COMMERCIAL

## 2020-02-28 VITALS
BODY MASS INDEX: 38.5 KG/M2 | DIASTOLIC BLOOD PRESSURE: 73 MMHG | SYSTOLIC BLOOD PRESSURE: 166 MMHG | WEIGHT: 275 LBS | HEIGHT: 71 IN | HEART RATE: 100 BPM

## 2020-02-28 DIAGNOSIS — S92.344A NONDISPLACED FRACTURE OF FOURTH METATARSAL BONE, RIGHT FOOT, INITIAL ENCOUNTER FOR CLOSED FRACTURE: Primary | ICD-10-CM

## 2020-02-28 DIAGNOSIS — M79.671 PAIN IN RIGHT FOOT: ICD-10-CM

## 2020-02-28 PROCEDURE — 73630 X-RAY EXAM OF FOOT: CPT

## 2020-02-28 PROCEDURE — 3078F DIAST BP <80 MM HG: CPT | Performed by: ORTHOPAEDIC SURGERY

## 2020-02-28 PROCEDURE — 3077F SYST BP >= 140 MM HG: CPT | Performed by: ORTHOPAEDIC SURGERY

## 2020-02-28 PROCEDURE — 99214 OFFICE O/P EST MOD 30 MIN: CPT | Performed by: ORTHOPAEDIC SURGERY

## 2020-02-28 PROCEDURE — 3008F BODY MASS INDEX DOCD: CPT | Performed by: ORTHOPAEDIC SURGERY

## 2020-02-28 PROCEDURE — 1036F TOBACCO NON-USER: CPT | Performed by: ORTHOPAEDIC SURGERY

## 2020-02-28 NOTE — PROGRESS NOTES
Assessment/Plan:  1  Nondisplaced fracture of fourth metatarsal bone, right foot, initial encounter for closed fracture  XR foot 3+ vw right       Alice Guevara has right foot pain and pinpoint tenderness at the base of the fourth metatarsal   Her x-rays not conclusive of fracture however there is a slight abnormality over the medial aspect of the base of the fourth metatarsal which could represent a nondisplaced fracture  Given her severity of pain and clinical setting I did place her in a short Cam boot at this time treating her for fracture  Will bring her back in 3 weeks for repeat x-ray evaluation  If there is still clinical concern or no significant improvement an MRI may be indicated  Subjective:   Manju Ornelas is a 23 y o  female who presents to the office for evaluation for a foot injury  She states that she had an injury over 1 week ago where she stepped awkwardly going down the stairs and felt a pop within her foot  She had some pain and even some bruising throughout the foot and difficulty walking  She has been trying to continue to walk throughout the week but has had increased discomfort  She felt like she was favoring her right foot prior to this due to a left hip injury  Today she has pain that is sharp and stabbing in nature over the dorsum and lateral aspect of her right foot  It worsens with walking improves with rest   She denies any numbness or burning throughout her foot  Review of Systems   Constitutional: Negative for chills, fever and unexpected weight change  HENT: Negative for hearing loss, nosebleeds and sore throat  Eyes: Negative for pain, redness and visual disturbance  Respiratory: Negative for cough, shortness of breath and wheezing  Cardiovascular: Negative for chest pain, palpitations and leg swelling  Gastrointestinal: Negative for abdominal pain, nausea and vomiting  Endocrine: Negative for polydipsia and polyuria     Genitourinary: Negative for dysuria and hematuria  Musculoskeletal:        See HPI   Skin: Negative for rash and wound  Neurological: Negative for dizziness, numbness and headaches  Psychiatric/Behavioral: Negative for decreased concentration and suicidal ideas  The patient is not nervous/anxious  Past Medical History:   Diagnosis Date    Congenital pes planus of both feet     Resolved 1/312017     Difficulty walking     Resolved 1/31/2017     Mass of hand, left     Resolved 1/31/2017     Obesity 8/11/2016    Pain     chronic back pain       Past Surgical History:   Procedure Laterality Date    FL INJECTION LEFT HIP (ARTHROGRAM)  11/30/2018    HAND SURGERY      HAND SURGERY      WISDOM TOOTH EXTRACTION         Family History   Problem Relation Age of Onset    Hypertension Mother     Heart disease Mother     Other Mother     Heart disease Father     Hypertension Father     Diabetes type II Maternal Grandfather     Diabetes type II Paternal Grandmother     Arthritis Family        Social History     Occupational History    Not on file   Tobacco Use    Smoking status: Never Smoker    Smokeless tobacco: Never Used   Substance and Sexual Activity    Alcohol use: No    Drug use: No    Sexual activity: Not on file         Current Outpatient Medications:     baclofen 10 mg tablet, Take 1 tablet (10 mg total) by mouth 2 (two) times a day as needed for muscle spasms, Disp: 30 tablet, Rfl: 0    meloxicam (MOBIC) 15 mg tablet, Take 1 tablet (15 mg total) by mouth daily as needed for moderate pain, Disp: 30 tablet, Rfl: 0    norgestimate-ethinyl estradiol (ORTHO-CYCLEN) 0 25-35 MG-MCG per tablet, Take 1 tablet by mouth daily, Disp: , Rfl:     traZODone (DESYREL) 50 mg tablet, Take 1 tablet (50 mg total) by mouth daily at bedtime for 3 days, THEN 2 tablets (100 mg total) daily at bedtime  , Disp: 63 tablet, Rfl: 0    No Known Allergies    Objective:  Vitals:    02/28/20 1347   BP: 166/73   Pulse: 100       Ortho Exam    Physical Exam   Constitutional: She is oriented to person, place, and time  She appears well-developed and well-nourished  HENT:   Head: Normocephalic and atraumatic  Eyes: Pupils are equal, round, and reactive to light  Conjunctivae are normal    Neck: Normal range of motion  Neck supple  Cardiovascular: Normal rate and intact distal pulses  Pulmonary/Chest: Effort normal  No respiratory distress  Musculoskeletal:        Right foot: There is bony tenderness and swelling  Feet:    As noted in HPI   Neurological: She is alert and oriented to person, place, and time  Skin: Skin is warm and dry  Psychiatric: She has a normal mood and affect  Her behavior is normal    Nursing note and vitals reviewed  I have personally reviewed pertinent films in PACS and my interpretation is as follows: Three-view x-ray of the right foot demonstrates a subtle abnormality at the base of the fourth metatarsal which could represent a nondisplaced fracture

## 2020-03-10 DIAGNOSIS — M62.838 MUSCLE SPASM: ICD-10-CM

## 2020-03-10 RX ORDER — BACLOFEN 10 MG/1
10 TABLET ORAL 2 TIMES DAILY PRN
Qty: 60 TABLET | Refills: 2 | Status: SHIPPED | OUTPATIENT
Start: 2020-03-10 | End: 2020-05-05 | Stop reason: SDUPTHER

## 2020-03-10 NOTE — TELEPHONE ENCOUNTER
Dr Debera Libman    As discussed with patient's mom, please send rx for metoprolol and refill on baclofen to [facility]

## 2020-03-11 DIAGNOSIS — G47.00 INSOMNIA, UNSPECIFIED TYPE: ICD-10-CM

## 2020-03-11 RX ORDER — TRAZODONE HYDROCHLORIDE 100 MG/1
100 TABLET ORAL
Qty: 90 TABLET | Refills: 0 | Status: SHIPPED | OUTPATIENT
Start: 2020-03-11 | End: 2020-05-05 | Stop reason: SDUPTHER

## 2020-03-12 ENCOUNTER — OFFICE VISIT (OUTPATIENT)
Dept: FAMILY MEDICINE CLINIC | Facility: CLINIC | Age: 20
End: 2020-03-12
Payer: COMMERCIAL

## 2020-03-12 VITALS
WEIGHT: 289 LBS | RESPIRATION RATE: 16 BRPM | DIASTOLIC BLOOD PRESSURE: 90 MMHG | BODY MASS INDEX: 40.46 KG/M2 | TEMPERATURE: 98.6 F | HEART RATE: 127 BPM | HEIGHT: 71 IN | SYSTOLIC BLOOD PRESSURE: 140 MMHG

## 2020-03-12 DIAGNOSIS — G47.00 INSOMNIA, UNSPECIFIED TYPE: ICD-10-CM

## 2020-03-12 DIAGNOSIS — I10 ESSENTIAL HYPERTENSION: ICD-10-CM

## 2020-03-12 DIAGNOSIS — R00.0 TACHYCARDIA: Primary | ICD-10-CM

## 2020-03-12 DIAGNOSIS — M62.838 MUSCLE SPASM: ICD-10-CM

## 2020-03-12 PROCEDURE — 3077F SYST BP >= 140 MM HG: CPT | Performed by: FAMILY MEDICINE

## 2020-03-12 PROCEDURE — 1036F TOBACCO NON-USER: CPT | Performed by: FAMILY MEDICINE

## 2020-03-12 PROCEDURE — 99214 OFFICE O/P EST MOD 30 MIN: CPT | Performed by: FAMILY MEDICINE

## 2020-03-12 PROCEDURE — 3080F DIAST BP >= 90 MM HG: CPT | Performed by: FAMILY MEDICINE

## 2020-03-14 NOTE — PROGRESS NOTES
Shahid Jackson 2000 female MRN: [id number]    FAMILY PRACTICE OFFICE VISIT  [facility] - [address]      ASSESSMENT/PLAN  Shahid Jackson is a 23 y o  female presents to the office for    Diagnoses and all orders for this visit:    Tachycardia  -     metoprolol tartrate (LOPRESSOR) 25 mg tablet; Take 1 tablet (25 mg total) by mouth every 12 (twelve) hours    Essential hypertension  -     metoprolol tartrate (LOPRESSOR) 25 mg tablet; Take 1 tablet (25 mg total) by mouth every 12 (twelve) hours    Insomnia, unspecified type       Tachycardia seems to be persistent with all her appointments  At this time will start the patient on Lopressor 25 mg b i d     Will aid in hypertension as well  Both new diagnosis for the patient  I advised her that diet and exercise would likely improve her blood pressure readings  Insomnia seems to be very well controlled on trazodone  Refilled yesterday  Chronic muscle spasms continue baclofen b i d  As needed    BMI Counseling: Body mass index is 40 88 kg/m²  The BMI is above normal  Nutrition recommendations include decreasing portion sizes and consuming healthier snacks  Return to the office in 1 month    Future Appointments   Date Time Provider Digna Issa   3/20/2020 10:00 AM DO IGLESIA Duran [facility]   3/24/2020  8:30 AM Ld Mensah MD [facility]          SUBJECTIVE  CC: Follow-up (Bp)      HPI:  Shahid Jackson is a 23 y o  female who presents for a follow-up appointment  Patient believes that trazodone 100 mg is her appropriate dose  She states that she is still restless however get a sufficient amount of sleep at nighttime compared to before  Patient is limited on exercise given that she has an acute right foot fracture  Patient states that she attempts to still diet and exercise  Blood pressures have been elevated at home  No log has been presented today    Do encouraged for her to take blood pressures on a regular basis  Patient states that she has been taking limited salt  Continues to have palpitations with tachycardia  Review of Systems   Constitutional: Negative for activity change, appetite change, chills, fatigue and fever  HENT: Negative for congestion  Respiratory: Negative for cough, chest tightness and shortness of breath  Cardiovascular: Positive for palpitations  Negative for chest pain and leg swelling  Gastrointestinal: Negative for abdominal distention, abdominal pain, constipation, diarrhea, nausea and vomiting  Musculoskeletal: Positive for arthralgias ( right foot pain secondary to acute injury)  Psychiatric/Behavioral: Positive for sleep disturbance (Improved)  The patient is nervous/anxious  All other systems reviewed and are negative        Historical Information   The patient history was reviewed as follows:  Past Medical History:   Diagnosis Date    Congenital pes planus of both feet     Resolved 1/312017     Difficulty walking     Resolved 1/31/2017     Mass of hand, left     Resolved 1/31/2017     Obesity 8/11/2016    Pain     chronic back pain         Medications:     Current Outpatient Medications:     baclofen 10 mg tablet, Take 1 tablet (10 mg total) by mouth 2 (two) times a day as needed for muscle spasms, Disp: 60 tablet, Rfl: 2    meloxicam (MOBIC) 15 mg tablet, Take 1 tablet (15 mg total) by mouth daily as needed for moderate pain, Disp: 30 tablet, Rfl: 0    metoprolol tartrate (LOPRESSOR) 25 mg tablet, Take 1 tablet (25 mg total) by mouth every 12 (twelve) hours, Disp: 60 tablet, Rfl: 2    norgestimate-ethinyl estradiol (ORTHO-CYCLEN) 0 25-35 MG-MCG per tablet, Take 1 tablet by mouth daily, Disp: , Rfl:     traZODone (DESYREL) 100 mg tablet, Take 1 tablet (100 mg total) by mouth daily at bedtime, Disp: 90 tablet, Rfl: 0    No Known Allergies    OBJECTIVE  Vitals:   Vitals:    03/12/20 1857   BP: 140/90   Pulse: (!) 127   Resp: 16 Temp: 98 6 °F (37 °C)   Weight: 131 kg (289 lb)   Height: 5' 10 5" (1 791 m)         Physical Exam   Constitutional: She is oriented to person, place, and time  Vital signs are normal  She appears well-developed and well-nourished  HENT:   Head: Normocephalic and atraumatic  Eyes: Pupils are equal, round, and reactive to light  Conjunctivae and EOM are normal    Neck: Normal range of motion  Neck supple  Cardiovascular: Regular rhythm, S1 normal, S2 normal, normal heart sounds and intact distal pulses  Tachycardia present  No murmur heard  Pulmonary/Chest: Effort normal and breath sounds normal  No respiratory distress  She has no wheezes  Musculoskeletal: She exhibits no edema  Right foot boot in place   Neurological: She is alert and oriented to person, place, and time  She has normal strength  Skin: Skin is warm  Psychiatric: She has a normal mood and affect  Her speech is normal and behavior is normal  Judgment and thought content normal    Vitals reviewed                   Hiral Alan MD,   [facility]  3/14/2020

## 2020-03-19 NOTE — PROGRESS NOTES
Called patient to inform her [facility] office will be closed tomorrow  Advised patient to call back and reschedule or I offered to discuss with her her symptoms over the phone and we can help guide her

## 2020-05-04 DIAGNOSIS — Z30.09 BIRTH CONTROL COUNSELING: Primary | ICD-10-CM

## 2020-05-04 RX ORDER — NORGESTIMATE AND ETHINYL ESTRADIOL 0.25-0.035
1 KIT ORAL DAILY
Qty: 28 TABLET | Refills: 11 | Status: SHIPPED | OUTPATIENT
Start: 2020-05-04 | End: 2020-05-05 | Stop reason: SDUPTHER

## 2020-05-05 DIAGNOSIS — I10 ESSENTIAL HYPERTENSION: ICD-10-CM

## 2020-05-05 DIAGNOSIS — R00.0 TACHYCARDIA: ICD-10-CM

## 2020-05-05 DIAGNOSIS — M62.838 MUSCLE SPASM: ICD-10-CM

## 2020-05-05 DIAGNOSIS — Z30.09 BIRTH CONTROL COUNSELING: ICD-10-CM

## 2020-05-05 DIAGNOSIS — G47.00 INSOMNIA, UNSPECIFIED TYPE: ICD-10-CM

## 2020-05-05 RX ORDER — NORGESTIMATE AND ETHINYL ESTRADIOL 0.25-0.035
1 KIT ORAL DAILY
Qty: 28 TABLET | Refills: 0 | Status: CANCELLED | OUTPATIENT
Start: 2020-05-05

## 2020-05-05 RX ORDER — BACLOFEN 10 MG/1
10 TABLET ORAL 2 TIMES DAILY PRN
Qty: 60 TABLET | Refills: 0 | Status: SHIPPED | OUTPATIENT
Start: 2020-05-05 | End: 2020-06-01

## 2020-05-05 RX ORDER — NORGESTIMATE AND ETHINYL ESTRADIOL 0.25-0.035
1 KIT ORAL DAILY
Qty: 28 TABLET | Refills: 11 | Status: SHIPPED | OUTPATIENT
Start: 2020-05-05 | End: 2020-06-01 | Stop reason: SDUPTHER

## 2020-05-05 RX ORDER — TRAZODONE HYDROCHLORIDE 100 MG/1
100 TABLET ORAL
Qty: 90 TABLET | Refills: 0 | Status: SHIPPED | OUTPATIENT
Start: 2020-05-05 | End: 2020-06-01 | Stop reason: SDUPTHER

## 2020-06-01 DIAGNOSIS — R00.0 TACHYCARDIA: ICD-10-CM

## 2020-06-01 DIAGNOSIS — M62.838 MUSCLE SPASM: ICD-10-CM

## 2020-06-01 DIAGNOSIS — I10 ESSENTIAL HYPERTENSION: ICD-10-CM

## 2020-06-01 DIAGNOSIS — Z30.09 BIRTH CONTROL COUNSELING: ICD-10-CM

## 2020-06-01 DIAGNOSIS — G47.00 INSOMNIA, UNSPECIFIED TYPE: ICD-10-CM

## 2020-06-01 RX ORDER — BACLOFEN 10 MG/1
TABLET ORAL
Qty: 60 TABLET | Refills: 2 | Status: SHIPPED | OUTPATIENT
Start: 2020-06-01 | End: 2020-10-20

## 2020-06-01 RX ORDER — NORGESTIMATE AND ETHINYL ESTRADIOL 0.25-0.035
1 KIT ORAL DAILY
Qty: 28 TABLET | Refills: 0 | Status: SHIPPED | OUTPATIENT
Start: 2020-06-01 | End: 2020-06-29 | Stop reason: SDUPTHER

## 2020-06-01 RX ORDER — TRAZODONE HYDROCHLORIDE 100 MG/1
100 TABLET ORAL
Qty: 90 TABLET | Refills: 0 | Status: SHIPPED | OUTPATIENT
Start: 2020-06-01 | End: 2020-06-29 | Stop reason: SDUPTHER

## 2020-06-01 RX ORDER — BACLOFEN 10 MG/1
10 TABLET ORAL 2 TIMES DAILY PRN
Qty: 60 TABLET | Refills: 0 | Status: SHIPPED | OUTPATIENT
Start: 2020-06-01 | End: 2020-06-29 | Stop reason: SDUPTHER

## 2020-06-05 ENCOUNTER — OFFICE VISIT (OUTPATIENT)
Dept: FAMILY MEDICINE CLINIC | Facility: CLINIC | Age: 20
End: 2020-06-05
Payer: COMMERCIAL

## 2020-06-05 VITALS
BODY MASS INDEX: 41.02 KG/M2 | RESPIRATION RATE: 12 BRPM | SYSTOLIC BLOOD PRESSURE: 138 MMHG | OXYGEN SATURATION: 99 % | HEIGHT: 71 IN | HEART RATE: 97 BPM | TEMPERATURE: 97.7 F | WEIGHT: 293 LBS | DIASTOLIC BLOOD PRESSURE: 84 MMHG

## 2020-06-05 DIAGNOSIS — R11.0 NAUSEA: ICD-10-CM

## 2020-06-05 DIAGNOSIS — M79.672 LEFT FOOT PAIN: ICD-10-CM

## 2020-06-05 DIAGNOSIS — I10 ESSENTIAL HYPERTENSION: Primary | ICD-10-CM

## 2020-06-05 DIAGNOSIS — R00.0 TACHYCARDIA: ICD-10-CM

## 2020-06-05 PROCEDURE — 1036F TOBACCO NON-USER: CPT | Performed by: FAMILY MEDICINE

## 2020-06-05 PROCEDURE — 3080F DIAST BP >= 90 MM HG: CPT | Performed by: FAMILY MEDICINE

## 2020-06-05 PROCEDURE — 3075F SYST BP GE 130 - 139MM HG: CPT | Performed by: FAMILY MEDICINE

## 2020-06-05 PROCEDURE — 99214 OFFICE O/P EST MOD 30 MIN: CPT | Performed by: FAMILY MEDICINE

## 2020-06-05 RX ORDER — PROPRANOLOL HYDROCHLORIDE 20 MG/1
20 TABLET ORAL EVERY 12 HOURS SCHEDULED
Qty: 48 TABLET | Refills: 0 | Status: SHIPPED | OUTPATIENT
Start: 2020-06-05 | End: 2020-06-29 | Stop reason: SDUPTHER

## 2020-06-06 ENCOUNTER — OFFICE VISIT (OUTPATIENT)
Dept: OBGYN CLINIC | Facility: CLINIC | Age: 20
End: 2020-06-06
Payer: COMMERCIAL

## 2020-06-06 ENCOUNTER — APPOINTMENT (OUTPATIENT)
Dept: RADIOLOGY | Facility: CLINIC | Age: 20
End: 2020-06-06
Payer: COMMERCIAL

## 2020-06-06 VITALS
TEMPERATURE: 99.5 F | DIASTOLIC BLOOD PRESSURE: 91 MMHG | BODY MASS INDEX: 41.02 KG/M2 | SYSTOLIC BLOOD PRESSURE: 139 MMHG | WEIGHT: 293 LBS | HEIGHT: 71 IN | HEART RATE: 81 BPM

## 2020-06-06 DIAGNOSIS — S92.344A NONDISPLACED FRACTURE OF FOURTH METATARSAL BONE, RIGHT FOOT, INITIAL ENCOUNTER FOR CLOSED FRACTURE: ICD-10-CM

## 2020-06-06 DIAGNOSIS — I10 ESSENTIAL HYPERTENSION: ICD-10-CM

## 2020-06-06 DIAGNOSIS — M72.2 PLANTAR FASCIITIS OF LEFT FOOT: Primary | ICD-10-CM

## 2020-06-06 PROCEDURE — 3008F BODY MASS INDEX DOCD: CPT | Performed by: ORTHOPAEDIC SURGERY

## 2020-06-06 PROCEDURE — 99214 OFFICE O/P EST MOD 30 MIN: CPT | Performed by: ORTHOPAEDIC SURGERY

## 2020-06-06 PROCEDURE — 3077F SYST BP >= 140 MM HG: CPT | Performed by: ORTHOPAEDIC SURGERY

## 2020-06-06 PROCEDURE — 3080F DIAST BP >= 90 MM HG: CPT | Performed by: ORTHOPAEDIC SURGERY

## 2020-06-06 PROCEDURE — 1036F TOBACCO NON-USER: CPT | Performed by: ORTHOPAEDIC SURGERY

## 2020-06-06 PROCEDURE — 73630 X-RAY EXAM OF FOOT: CPT

## 2020-06-27 DIAGNOSIS — Z30.09 BIRTH CONTROL COUNSELING: ICD-10-CM

## 2020-06-27 DIAGNOSIS — R00.0 TACHYCARDIA: ICD-10-CM

## 2020-06-27 DIAGNOSIS — G47.00 INSOMNIA, UNSPECIFIED TYPE: ICD-10-CM

## 2020-06-27 DIAGNOSIS — M62.838 MUSCLE SPASM: ICD-10-CM

## 2020-06-27 DIAGNOSIS — I10 ESSENTIAL HYPERTENSION: ICD-10-CM

## 2020-06-27 RX ORDER — TRAZODONE HYDROCHLORIDE 100 MG/1
100 TABLET ORAL
Qty: 90 TABLET | Refills: 0 | Status: CANCELLED | OUTPATIENT
Start: 2020-06-27

## 2020-06-27 RX ORDER — NORGESTIMATE AND ETHINYL ESTRADIOL 0.25-0.035
1 KIT ORAL DAILY
Qty: 28 TABLET | Refills: 0 | Status: CANCELLED | OUTPATIENT
Start: 2020-06-27

## 2020-06-27 RX ORDER — PROPRANOLOL HYDROCHLORIDE 20 MG/1
20 TABLET ORAL EVERY 12 HOURS SCHEDULED
Qty: 48 TABLET | Refills: 0 | Status: CANCELLED | OUTPATIENT
Start: 2020-06-27 | End: 2020-07-21

## 2020-06-27 RX ORDER — BACLOFEN 10 MG/1
10 TABLET ORAL 2 TIMES DAILY PRN
Qty: 60 TABLET | Refills: 0 | Status: CANCELLED | OUTPATIENT
Start: 2020-06-27

## 2020-06-29 DIAGNOSIS — Z30.09 BIRTH CONTROL COUNSELING: ICD-10-CM

## 2020-06-29 DIAGNOSIS — R00.0 TACHYCARDIA: ICD-10-CM

## 2020-06-29 DIAGNOSIS — M62.838 MUSCLE SPASM: ICD-10-CM

## 2020-06-29 DIAGNOSIS — G47.00 INSOMNIA, UNSPECIFIED TYPE: ICD-10-CM

## 2020-06-29 DIAGNOSIS — I10 ESSENTIAL HYPERTENSION: ICD-10-CM

## 2020-06-29 RX ORDER — NORGESTIMATE AND ETHINYL ESTRADIOL 0.25-0.035
1 KIT ORAL DAILY
Qty: 28 TABLET | Refills: 0 | Status: SHIPPED | OUTPATIENT
Start: 2020-06-29 | End: 2020-07-28 | Stop reason: SDUPTHER

## 2020-06-29 RX ORDER — TRAZODONE HYDROCHLORIDE 100 MG/1
100 TABLET ORAL
Qty: 90 TABLET | Refills: 0 | Status: SHIPPED | OUTPATIENT
Start: 2020-06-29 | End: 2020-07-28 | Stop reason: SDUPTHER

## 2020-06-29 RX ORDER — PROPRANOLOL HYDROCHLORIDE 20 MG/1
20 TABLET ORAL EVERY 12 HOURS SCHEDULED
Qty: 48 TABLET | Refills: 0 | Status: SHIPPED | OUTPATIENT
Start: 2020-06-29 | End: 2020-07-28 | Stop reason: SDUPTHER

## 2020-06-29 RX ORDER — BACLOFEN 10 MG/1
10 TABLET ORAL 2 TIMES DAILY PRN
Qty: 60 TABLET | Refills: 0 | Status: SHIPPED | OUTPATIENT
Start: 2020-06-29 | End: 2020-10-20

## 2020-07-01 ENCOUNTER — TELEPHONE (OUTPATIENT)
Dept: FAMILY MEDICINE CLINIC | Facility: CLINIC | Age: 20
End: 2020-07-01

## 2020-07-01 DIAGNOSIS — N92.6 MISSED PERIOD: Primary | ICD-10-CM

## 2020-07-01 NOTE — PROGRESS NOTES
Very pleased that the patient's blood pressure and pulse are responding to propanolol twice a day  Patient is not dizzy anymore  Unfortunately propanolol does not have a side effect of miss periods  Will do an estrogen/progesterone/AcG to be sure that the patient isn't showing signs of pregnancy    Do not start new birth control pack to withdrawal bleed

## 2020-07-01 NOTE — TELEPHONE ENCOUNTER
----- Message from Lizet Chew MD sent at 7/1/2020  3:00 PM EDT -----  Regarding: FW: Visit Follow-Up Question  Contact: [phone]  Philip is coming to  BW HCG/estrogen/progesterone    ----- Message -----  From: Magdi Marquez MA  Sent: 7/1/2020  10:59 AM EDT  To: Lizet Chew MD  Subject: FW: Visit Follow-Up Question                         ----- Message -----  From: Rony Woo  Sent: 7/1/2020   9:33 AM EDT  To: Janeth Rodríguez [facility]  Subject: Visit Follow-Up Question                         Good morning! Firstly, I would like to say that I know that I was supposed to make another BP check appointment but I have been having car troubles to get in  With the new heart medicine I am not getting dizzy like I was Kathrin Boyd!)  My BP has been fluctuating around 135/80 and HR around 75-80 (that is typically always my morning readings, and a very little but higher in afternoon/evening)  My next question is whether or not my new heart medicine can affect my period? I was due for my period last Wednesday the 24th, and I typically get it two mornings after  I still havent gotten my period, but am due to start a new pack tomorrow night  As a precaution I took an at home pregnancy test (well two because: anxiety), and both came back negative  I was wondering if I should start my new pack as usual or if I should be doing anything different  I only ask because my period has been like clockwork since Ayala been on the birth control, and even before then I have never had a missed period  Sorry again for not making the appointment you asked for    but I am hoping to get a message back and hear from you soon  Rony Woo     Ps   Attached is a picture of a BP reading as proof that I am actually checking it :)

## 2020-07-06 ENCOUNTER — TELEPHONE (OUTPATIENT)
Dept: FAMILY MEDICINE CLINIC | Facility: CLINIC | Age: 20
End: 2020-07-06

## 2020-07-06 NOTE — TELEPHONE ENCOUNTER
Dr Rupa Busby:    Patient called for her labwork results  She went to [facility] in [place]  Please advise

## 2020-07-07 ENCOUNTER — TELEPHONE (OUTPATIENT)
Dept: OBGYN CLINIC | Facility: CLINIC | Age: 20
End: 2020-07-07

## 2020-07-07 ENCOUNTER — TELEPHONE (OUTPATIENT)
Dept: FAMILY MEDICINE CLINIC | Facility: CLINIC | Age: 20
End: 2020-07-07

## 2020-07-07 DIAGNOSIS — N92.6 MISSED PERIOD: Primary | ICD-10-CM

## 2020-07-07 LAB
ESTROGEN SERPL-MCNC: 856 PG/ML
HCG INTACT+B SERPL-ACNC: <1 MIU/ML
PROGEST SERPL-MCNC: 0.4 NG/ML

## 2020-07-07 NOTE — TELEPHONE ENCOUNTER
Please see if we can call sooner for a closer apointment given she has no menses this month which is strange likely will need to provoke a period   Can they do virtual?

## 2020-07-07 NOTE — TELEPHONE ENCOUNTER
Patient called in to schedule NP apt  Elevated estrogen levels and referred by PCP  Scheduled for 2 weeks out with first availability  Pt aware that I would send you a task to see if needs to do anything prior and is ok to wait that long to be seen  Patient has [address] so could not schedule here

## 2020-07-07 NOTE — TELEPHONE ENCOUNTER
Dr Glenn Contreras,  Dr Jaya Bailey office doesn't have any availability until 7/21 with a nurse practitioner  Please advise

## 2020-07-10 ENCOUNTER — TELEPHONE (OUTPATIENT)
Dept: OBGYN CLINIC | Facility: CLINIC | Age: 20
End: 2020-07-10

## 2020-07-10 NOTE — TELEPHONE ENCOUNTER
Called Dr Carlene Carreon office and spoke with Blanca Jones  [phone]  She advised that patient was scheduled for 7/21, but patient cancelled that appointment  They are really booked and the earliest she has available right now  Is 7/27  She wanted to know if patient did home pregnancy test and if they should order HCG level for time being  Please advise

## 2020-07-10 NOTE — TELEPHONE ENCOUNTER
[address] [facility] office,   Can we call the office to see if we can get a sooner appt on the patients behalf? Melissa Rose!

## 2020-07-13 ENCOUNTER — OFFICE VISIT (OUTPATIENT)
Dept: OBGYN CLINIC | Facility: CLINIC | Age: 20
End: 2020-07-13
Payer: COMMERCIAL

## 2020-07-13 VITALS
TEMPERATURE: 98.1 F | SYSTOLIC BLOOD PRESSURE: 140 MMHG | DIASTOLIC BLOOD PRESSURE: 84 MMHG | BODY MASS INDEX: 41.95 KG/M2 | HEIGHT: 70 IN | WEIGHT: 293 LBS

## 2020-07-13 DIAGNOSIS — N92.6 IRREGULAR BLEEDING: Primary | ICD-10-CM

## 2020-07-13 PROCEDURE — 3008F BODY MASS INDEX DOCD: CPT | Performed by: NURSE PRACTITIONER

## 2020-07-13 PROCEDURE — 1036F TOBACCO NON-USER: CPT | Performed by: NURSE PRACTITIONER

## 2020-07-13 PROCEDURE — 99213 OFFICE O/P EST LOW 20 MIN: CPT | Performed by: NURSE PRACTITIONER

## 2020-07-13 PROCEDURE — 3077F SYST BP >= 140 MM HG: CPT | Performed by: NURSE PRACTITIONER

## 2020-07-13 PROCEDURE — 3079F DIAST BP 80-89 MM HG: CPT | Performed by: NURSE PRACTITIONER

## 2020-07-13 NOTE — PROGRESS NOTES
Assessment/Plan:    Irregular bleeding  Discussed with patient that it is perfectly normal and safe to not get a menses while on an OCP  Reviewed how OCPs work with hormones and menstrual cycle  Since stopping discussed it can take up to 3 months for menses to return  Pt is not currently sexually active  She can restart OCP at any time  Will send for blood work d/t long history of irregular menses  Reviewed lab work fasting and not to start OCP until after getting blood work done as otherwise blood work will be inaccurate  Will call with results and follow up accordingly  Diagnoses and all orders for this visit:    Irregular bleeding  -     TSH, 3rd generation  -     T4, free  -     CBC and differential  -     Follicle stimulating hormone  -     Insulin, fasting  -     Luteinizing hormone  -     Prolactin  -     Testosterone          Subjective:      Patient ID: Vicki Mayfield is a 23 y o  female  She presents as a new patient to our office, referred by her PCP due to amenorrhea and elevated estrogen levels  Menarche at age 6  Menses were normal, heavy  Got lighter as she got older  At age 19 started OCP to help with dysmenorrhea and better menstrual regulation  Has never skipped or missed months  Would vary in length from a few days to a week or so late  Menses would last 5-6 days, moderate flow, very bad cramping with menses  She does state a hx of chronic back pain as well so would be difficult sometimes to tell the difference  Once she started OCP cramping was greatly improved  Menses would occur monthly, two days into placebo pills  Menses would last 3-4 days, light  No bleeding or spotting in between menses  Denies any pelvic pain or bleeding with intercourse  LMP: 5/27/2020    Menses was due 6/24/2020 and never came  Messaged her PCP about not getting her menses while taking her OCP     PCP recommended she discontinue OCP and have some lab work done, since she did not get her menses  She had lab work done 7/2/2020 and was advised elevated estrogen and to follow up with GYN  Has not had menses since stopping OCP in June  Denies any male pattern hair  Has had difficulty losing weight  Mother has hx of irregular menses, 2603-4073 ovarian cyst rupture, 2006 ovarian cysts bilateral, right oophrectomy 2006 due to ovarian cysts, 9608-1600 had hysterectomy due to menorrhagia due to blood thinners, 2003 D & E, 1998 & 2004 normal pregnancies  The following portions of the patient's history were reviewed and updated as appropriate: allergies, current medications, past family history, past medical history, past social history, past surgical history and problem list     Review of Systems   All other systems reviewed and are negative  Objective:      /84 (BP Location: Right arm, Patient Position: Sitting, Cuff Size: Standard)   Temp 98 1 °F (36 7 °C)   Ht 5' 10" (1 778 m)   Wt (!) 141 kg (311 lb 12 8 oz)   BMI 44 74 kg/m²          Physical Exam   Constitutional: She is oriented to person, place, and time  She appears well-developed and well-nourished  Neck: Normal range of motion  Neck supple  No tracheal deviation present  No thyromegaly present  Cardiovascular: Normal rate, regular rhythm and normal heart sounds  Pulmonary/Chest: Effort normal and breath sounds normal    Abdominal: Soft  Bowel sounds are normal    Musculoskeletal: Normal range of motion  Neurological: She is alert and oriented to person, place, and time  Skin: Skin is warm and dry  Psychiatric: She has a normal mood and affect   Her behavior is normal  Judgment and thought content normal

## 2020-07-14 NOTE — TELEPHONE ENCOUNTER
Please just complete this task  I already sent her ranother OBGYN  Yes HCG quant was negative  Estrogen was high, and progesterone low   Will be seeing Dr Debo Torres this week

## 2020-07-15 PROBLEM — N92.6 IRREGULAR BLEEDING: Status: ACTIVE | Noted: 2020-07-15

## 2020-07-15 NOTE — ASSESSMENT & PLAN NOTE
Discussed with patient that it is perfectly normal and safe to not get a menses while on an OCP  Reviewed how OCPs work with hormones and menstrual cycle  Since stopping discussed it can take up to 3 months for menses to return  Pt is not currently sexually active  She can restart OCP at any time  Will send for blood work d/t long history of irregular menses  Reviewed lab work fasting and not to start OCP until after getting blood work done as otherwise blood work will be inaccurate  Will call with results and follow up accordingly

## 2020-07-18 LAB
BASOPHILS # BLD AUTO: 0 X10E3/UL (ref 0–0.2)
BASOPHILS NFR BLD AUTO: 1 %
EOSINOPHIL # BLD AUTO: 0.1 X10E3/UL (ref 0–0.4)
EOSINOPHIL NFR BLD AUTO: 1 %
ERYTHROCYTE [DISTWIDTH] IN BLOOD BY AUTOMATED COUNT: 12.4 % (ref 11.7–15.4)
FSH SERPL-ACNC: 5 MIU/ML
HCT VFR BLD AUTO: 39.1 % (ref 34–46.6)
HGB BLD-MCNC: 12.5 G/DL (ref 11.1–15.9)
IMM GRANULOCYTES # BLD: 0 X10E3/UL (ref 0–0.1)
IMM GRANULOCYTES NFR BLD: 0 %
LH SERPL-ACNC: 3.3 MIU/ML
LYMPHOCYTES # BLD AUTO: 1.9 X10E3/UL (ref 0.7–3.1)
LYMPHOCYTES NFR BLD AUTO: 30 %
MCH RBC QN AUTO: 27.1 PG (ref 26.6–33)
MCHC RBC AUTO-ENTMCNC: 32 G/DL (ref 31.5–35.7)
MCV RBC AUTO: 85 FL (ref 79–97)
MONOCYTES # BLD AUTO: 0.3 X10E3/UL (ref 0.1–0.9)
MONOCYTES NFR BLD AUTO: 5 %
NEUTROPHILS # BLD AUTO: 4 X10E3/UL (ref 1.4–7)
NEUTROPHILS NFR BLD AUTO: 63 %
PLATELET # BLD AUTO: 290 X10E3/UL (ref 150–450)
PROLACTIN SERPL-MCNC: 10.9 NG/ML (ref 4.8–23.3)
RBC # BLD AUTO: 4.62 X10E6/UL (ref 3.77–5.28)
T4 FREE SERPL-MCNC: 1 NG/DL (ref 0.82–1.77)
TESTOST SERPL-MCNC: 21 NG/DL (ref 8–48)
TSH SERPL DL<=0.005 MIU/L-ACNC: 3.58 UIU/ML (ref 0.45–4.5)
WBC # BLD AUTO: 6.4 X10E3/UL (ref 3.4–10.8)

## 2020-07-27 DIAGNOSIS — I10 ESSENTIAL HYPERTENSION: ICD-10-CM

## 2020-07-27 DIAGNOSIS — G47.00 INSOMNIA, UNSPECIFIED TYPE: ICD-10-CM

## 2020-07-27 DIAGNOSIS — R00.0 TACHYCARDIA: ICD-10-CM

## 2020-07-27 DIAGNOSIS — Z30.09 BIRTH CONTROL COUNSELING: ICD-10-CM

## 2020-07-27 RX ORDER — NORGESTIMATE AND ETHINYL ESTRADIOL 0.25-0.035
1 KIT ORAL DAILY
Qty: 28 TABLET | Refills: 0 | Status: CANCELLED | OUTPATIENT
Start: 2020-07-27

## 2020-07-27 RX ORDER — TRAZODONE HYDROCHLORIDE 100 MG/1
100 TABLET ORAL
Qty: 90 TABLET | Refills: 0 | Status: CANCELLED | OUTPATIENT
Start: 2020-07-27

## 2020-07-27 RX ORDER — PROPRANOLOL HYDROCHLORIDE 20 MG/1
20 TABLET ORAL EVERY 12 HOURS SCHEDULED
Qty: 48 TABLET | Refills: 0 | Status: CANCELLED | OUTPATIENT
Start: 2020-07-27 | End: 2020-08-20

## 2020-07-28 DIAGNOSIS — G47.00 INSOMNIA, UNSPECIFIED TYPE: ICD-10-CM

## 2020-07-28 DIAGNOSIS — R00.0 TACHYCARDIA: ICD-10-CM

## 2020-07-28 DIAGNOSIS — Z30.09 BIRTH CONTROL COUNSELING: ICD-10-CM

## 2020-07-28 DIAGNOSIS — I10 ESSENTIAL HYPERTENSION: ICD-10-CM

## 2020-07-28 RX ORDER — TRAZODONE HYDROCHLORIDE 100 MG/1
100 TABLET ORAL
Qty: 90 TABLET | Refills: 0 | Status: SHIPPED | OUTPATIENT
Start: 2020-07-28 | End: 2020-08-24 | Stop reason: SDUPTHER

## 2020-07-28 RX ORDER — PROPRANOLOL HYDROCHLORIDE 20 MG/1
20 TABLET ORAL EVERY 12 HOURS SCHEDULED
Qty: 48 TABLET | Refills: 0 | Status: SHIPPED | OUTPATIENT
Start: 2020-07-28 | End: 2020-08-24 | Stop reason: SDUPTHER

## 2020-07-28 RX ORDER — NORGESTIMATE AND ETHINYL ESTRADIOL 0.25-0.035
1 KIT ORAL DAILY
Qty: 28 TABLET | Refills: 0 | Status: SHIPPED | OUTPATIENT
Start: 2020-07-28 | End: 2020-10-20

## 2020-08-01 LAB — INSULIN SERPL-ACNC: 20.3 UIU/ML (ref 2.6–24.9)

## 2020-08-24 DIAGNOSIS — I10 ESSENTIAL HYPERTENSION: ICD-10-CM

## 2020-08-24 DIAGNOSIS — G47.00 INSOMNIA, UNSPECIFIED TYPE: ICD-10-CM

## 2020-08-24 DIAGNOSIS — R00.0 TACHYCARDIA: ICD-10-CM

## 2020-08-24 RX ORDER — PROPRANOLOL HYDROCHLORIDE 20 MG/1
20 TABLET ORAL EVERY 12 HOURS SCHEDULED
Qty: 48 TABLET | Refills: 0 | Status: SHIPPED | OUTPATIENT
Start: 2020-08-24 | End: 2020-09-21 | Stop reason: SDUPTHER

## 2020-08-24 RX ORDER — TRAZODONE HYDROCHLORIDE 100 MG/1
100 TABLET ORAL
Qty: 90 TABLET | Refills: 0 | Status: SHIPPED | OUTPATIENT
Start: 2020-08-24 | End: 2020-09-21 | Stop reason: SDUPTHER

## 2020-09-21 DIAGNOSIS — R00.0 TACHYCARDIA: ICD-10-CM

## 2020-09-21 DIAGNOSIS — I10 ESSENTIAL HYPERTENSION: ICD-10-CM

## 2020-09-21 DIAGNOSIS — G47.00 INSOMNIA, UNSPECIFIED TYPE: ICD-10-CM

## 2020-09-21 RX ORDER — PROPRANOLOL HYDROCHLORIDE 20 MG/1
20 TABLET ORAL EVERY 12 HOURS SCHEDULED
Qty: 48 TABLET | Refills: 0 | Status: SHIPPED | OUTPATIENT
Start: 2020-09-21 | End: 2020-10-20

## 2020-09-21 RX ORDER — TRAZODONE HYDROCHLORIDE 100 MG/1
100 TABLET ORAL
Qty: 90 TABLET | Refills: 0 | Status: SHIPPED | OUTPATIENT
Start: 2020-09-21 | End: 2020-10-20

## 2020-10-16 DIAGNOSIS — Z3A.01 LESS THAN 8 WEEKS GESTATION OF PREGNANCY: ICD-10-CM

## 2020-10-16 DIAGNOSIS — I10 ESSENTIAL HYPERTENSION: Primary | ICD-10-CM

## 2020-10-16 RX ORDER — LABETALOL 100 MG/1
100 TABLET, FILM COATED ORAL DAILY
Qty: 30 TABLET | Refills: 0 | Status: SHIPPED | OUTPATIENT
Start: 2020-10-16 | End: 2020-11-18 | Stop reason: SDUPTHER

## 2020-10-20 ENCOUNTER — OFFICE VISIT (OUTPATIENT)
Dept: FAMILY MEDICINE CLINIC | Facility: CLINIC | Age: 20
End: 2020-10-20
Payer: COMMERCIAL

## 2020-10-20 VITALS
RESPIRATION RATE: 20 BRPM | HEIGHT: 70 IN | SYSTOLIC BLOOD PRESSURE: 122 MMHG | BODY MASS INDEX: 41.95 KG/M2 | OXYGEN SATURATION: 100 % | HEART RATE: 98 BPM | TEMPERATURE: 97.6 F | DIASTOLIC BLOOD PRESSURE: 80 MMHG | WEIGHT: 293 LBS

## 2020-10-20 DIAGNOSIS — Z3A.01 LESS THAN 8 WEEKS GESTATION OF PREGNANCY: ICD-10-CM

## 2020-10-20 DIAGNOSIS — R00.0 TACHYCARDIA: ICD-10-CM

## 2020-10-20 DIAGNOSIS — I10 ESSENTIAL HYPERTENSION: Primary | ICD-10-CM

## 2020-10-20 DIAGNOSIS — I10 ESSENTIAL HYPERTENSION: ICD-10-CM

## 2020-10-20 DIAGNOSIS — G47.00 INSOMNIA, UNSPECIFIED TYPE: ICD-10-CM

## 2020-10-20 DIAGNOSIS — M54.16 LUMBAR RADICULOPATHY: ICD-10-CM

## 2020-10-20 LAB — HCG INTACT+B SERPL-ACNC: 1062 MIU/ML

## 2020-10-20 PROCEDURE — 3079F DIAST BP 80-89 MM HG: CPT | Performed by: FAMILY MEDICINE

## 2020-10-20 PROCEDURE — 3725F SCREEN DEPRESSION PERFORMED: CPT | Performed by: FAMILY MEDICINE

## 2020-10-20 PROCEDURE — 3074F SYST BP LT 130 MM HG: CPT | Performed by: FAMILY MEDICINE

## 2020-10-20 PROCEDURE — 99214 OFFICE O/P EST MOD 30 MIN: CPT | Performed by: FAMILY MEDICINE

## 2020-10-20 PROCEDURE — 1036F TOBACCO NON-USER: CPT | Performed by: FAMILY MEDICINE

## 2020-10-21 LAB — HCG INTACT+B SERPL-ACNC: 2691 MIU/ML

## 2020-10-22 ENCOUNTER — TELEPHONE (OUTPATIENT)
Dept: FAMILY MEDICINE CLINIC | Facility: CLINIC | Age: 20
End: 2020-10-22

## 2020-11-05 ENCOUNTER — TELEPHONE (OUTPATIENT)
Dept: FAMILY MEDICINE CLINIC | Facility: CLINIC | Age: 20
End: 2020-11-05

## 2020-11-13 ENCOUNTER — INITIAL PRENATAL (OUTPATIENT)
Dept: OBGYN CLINIC | Facility: CLINIC | Age: 20
End: 2020-11-13

## 2020-11-13 VITALS
RESPIRATION RATE: 16 BRPM | WEIGHT: 293 LBS | HEIGHT: 70 IN | TEMPERATURE: 99.3 F | BODY MASS INDEX: 41.95 KG/M2 | DIASTOLIC BLOOD PRESSURE: 90 MMHG | HEART RATE: 92 BPM | SYSTOLIC BLOOD PRESSURE: 140 MMHG

## 2020-11-13 DIAGNOSIS — Z34.01 ENCOUNTER FOR SUPERVISION OF NORMAL FIRST PREGNANCY IN FIRST TRIMESTER: Primary | ICD-10-CM

## 2020-11-13 DIAGNOSIS — E66.01 CLASS 3 SEVERE OBESITY WITH BODY MASS INDEX (BMI) OF 45.0 TO 49.9 IN ADULT, UNSPECIFIED OBESITY TYPE, UNSPECIFIED WHETHER SERIOUS COMORBIDITY PRESENT (HCC): ICD-10-CM

## 2020-11-13 DIAGNOSIS — I10 ESSENTIAL HYPERTENSION: ICD-10-CM

## 2020-11-13 DIAGNOSIS — Z3A.08 8 WEEKS GESTATION OF PREGNANCY: ICD-10-CM

## 2020-11-13 PROCEDURE — NOBC: Performed by: OBSTETRICS & GYNECOLOGY

## 2020-11-17 DIAGNOSIS — I10 ESSENTIAL HYPERTENSION: ICD-10-CM

## 2020-11-17 RX ORDER — LABETALOL 100 MG/1
100 TABLET, FILM COATED ORAL DAILY
Qty: 30 TABLET | Refills: 0 | Status: CANCELLED | OUTPATIENT
Start: 2020-11-17

## 2020-11-18 DIAGNOSIS — I10 ESSENTIAL HYPERTENSION: ICD-10-CM

## 2020-11-18 RX ORDER — LABETALOL 100 MG/1
100 TABLET, FILM COATED ORAL DAILY
Qty: 90 TABLET | Refills: 2 | Status: SHIPPED | OUTPATIENT
Start: 2020-11-18 | End: 2020-11-20

## 2020-11-19 ENCOUNTER — TELEPHONE (OUTPATIENT)
Dept: PERINATAL CARE | Facility: CLINIC | Age: 20
End: 2020-11-19

## 2020-11-20 ENCOUNTER — ULTRASOUND (OUTPATIENT)
Dept: PERINATAL CARE | Facility: OTHER | Age: 20
End: 2020-11-20
Payer: OTHER GOVERNMENT

## 2020-11-20 ENCOUNTER — TELEPHONE (OUTPATIENT)
Dept: PERINATAL CARE | Facility: CLINIC | Age: 20
End: 2020-11-20

## 2020-11-20 VITALS
DIASTOLIC BLOOD PRESSURE: 80 MMHG | BODY MASS INDEX: 43.4 KG/M2 | WEIGHT: 293 LBS | HEIGHT: 69 IN | SYSTOLIC BLOOD PRESSURE: 150 MMHG | HEART RATE: 110 BPM

## 2020-11-20 DIAGNOSIS — E66.01 CLASS 3 SEVERE OBESITY WITH BODY MASS INDEX (BMI) OF 45.0 TO 49.9 IN ADULT, UNSPECIFIED OBESITY TYPE, UNSPECIFIED WHETHER SERIOUS COMORBIDITY PRESENT (HCC): ICD-10-CM

## 2020-11-20 DIAGNOSIS — R79.89 ELEVATED TSH: ICD-10-CM

## 2020-11-20 DIAGNOSIS — Z3A.09 9 WEEKS GESTATION OF PREGNANCY: ICD-10-CM

## 2020-11-20 DIAGNOSIS — I10 ESSENTIAL HYPERTENSION: ICD-10-CM

## 2020-11-20 DIAGNOSIS — O36.80X0 ENCOUNTER TO DETERMINE FETAL VIABILITY OF PREGNANCY, SINGLE OR UNSPECIFIED FETUS: ICD-10-CM

## 2020-11-20 DIAGNOSIS — Z34.01 ENCOUNTER FOR SUPERVISION OF NORMAL FIRST PREGNANCY IN FIRST TRIMESTER: ICD-10-CM

## 2020-11-20 DIAGNOSIS — O10.911 CHRONIC HYPERTENSION IN OBSTETRIC CONTEXT IN FIRST TRIMESTER: Primary | ICD-10-CM

## 2020-11-20 DIAGNOSIS — O99.210 OBESITY AFFECTING PREGNANCY, ANTEPARTUM: ICD-10-CM

## 2020-11-20 PROBLEM — M25.561 ACUTE PAIN OF RIGHT KNEE: Status: RESOLVED | Noted: 2019-07-09 | Resolved: 2020-11-20

## 2020-11-20 PROBLEM — S09.90XA RECENT HEAD TRAUMA: Status: RESOLVED | Noted: 2017-01-31 | Resolved: 2020-11-20

## 2020-11-20 PROBLEM — S50.02XA CONTUSION OF LEFT ELBOW: Status: RESOLVED | Noted: 2019-01-31 | Resolved: 2020-11-20

## 2020-11-20 PROBLEM — R51.9 HEADACHE: Status: RESOLVED | Noted: 2017-01-31 | Resolved: 2020-11-20

## 2020-11-20 PROBLEM — R94.6 ABNORMAL RESULTS OF THYROID FUNCTION STUDIES: Status: ACTIVE | Noted: 2019-08-09

## 2020-11-20 PROBLEM — S40.012A CONTUSION OF LEFT SHOULDER: Status: RESOLVED | Noted: 2019-01-31 | Resolved: 2020-11-20

## 2020-11-20 PROBLEM — R76.8 POSITIVE ANA (ANTINUCLEAR ANTIBODY): Status: ACTIVE | Noted: 2019-08-09

## 2020-11-20 PROBLEM — N92.6 IRREGULAR BLEEDING: Status: RESOLVED | Noted: 2020-07-15 | Resolved: 2020-11-20

## 2020-11-20 PROCEDURE — NC001 PR NO CHARGE: Performed by: OBSTETRICS & GYNECOLOGY

## 2020-11-20 PROCEDURE — 99242 OFF/OP CONSLTJ NEW/EST SF 20: CPT | Performed by: OBSTETRICS & GYNECOLOGY

## 2020-11-20 PROCEDURE — 76801 OB US < 14 WKS SINGLE FETUS: CPT | Performed by: OBSTETRICS & GYNECOLOGY

## 2020-11-20 PROCEDURE — 3008F BODY MASS INDEX DOCD: CPT | Performed by: FAMILY MEDICINE

## 2020-11-20 RX ORDER — ASPIRIN 81 MG/1
162 TABLET ORAL DAILY
Qty: 180 TABLET | Refills: 3 | Status: SHIPPED | OUTPATIENT
Start: 2020-11-20 | End: 2021-05-28

## 2020-11-20 RX ORDER — LABETALOL 200 MG/1
200 TABLET, FILM COATED ORAL 2 TIMES DAILY
Qty: 180 TABLET | Refills: 1 | Status: SHIPPED | OUTPATIENT
Start: 2020-11-20 | End: 2021-04-19

## 2020-11-22 LAB — TSH SERPL DL<=0.005 MIU/L-ACNC: 3.12 UIU/ML (ref 0.45–4.5)

## 2020-11-24 DIAGNOSIS — O99.810 ABNORMAL GLUCOSE TOLERANCE TEST (GTT) DURING PREGNANCY, ANTEPARTUM: Primary | ICD-10-CM

## 2020-11-24 LAB
ABO GROUP BLD: NORMAL
ALBUMIN SERPL-MCNC: 4 G/DL (ref 3.9–5)
ALBUMIN/GLOB SERPL: 1.4 {RATIO} (ref 1.2–2.2)
ALP SERPL-CCNC: 75 IU/L (ref 39–117)
ALT SERPL-CCNC: 16 IU/L (ref 0–32)
AST SERPL-CCNC: 22 IU/L (ref 0–40)
BASOPHILS # BLD AUTO: 0 X10E3/UL (ref 0–0.2)
BASOPHILS NFR BLD AUTO: 0 %
BILIRUB SERPL-MCNC: 0.3 MG/DL (ref 0–1.2)
BLD GP AB SCN SERPL QL: NEGATIVE
BUN SERPL-MCNC: 7 MG/DL (ref 6–20)
BUN/CREAT SERPL: 10 (ref 9–23)
CALCIUM SERPL-MCNC: 9.3 MG/DL (ref 8.7–10.2)
CHLORIDE SERPL-SCNC: 102 MMOL/L (ref 96–106)
CO2 SERPL-SCNC: 19 MMOL/L (ref 20–29)
CREAT SERPL-MCNC: 0.69 MG/DL (ref 0.57–1)
CREAT UR-MCNC: 98.9 MG/DL
EOSINOPHIL # BLD AUTO: 0.1 X10E3/UL (ref 0–0.4)
EOSINOPHIL NFR BLD AUTO: 1 %
ERYTHROCYTE [DISTWIDTH] IN BLOOD BY AUTOMATED COUNT: 12.7 % (ref 11.7–15.4)
GLOBULIN SER-MCNC: 2.9 G/DL (ref 1.5–4.5)
GLUCOSE 1H P 50 G GLC PO SERPL-MCNC: 152 MG/DL (ref 65–139)
GLUCOSE SERPL-MCNC: 89 MG/DL (ref 65–99)
HBV SURFACE AG SERPL QL IA: NEGATIVE
HCT VFR BLD AUTO: 34.5 % (ref 34–46.6)
HCV AB S/CO SERPL IA: <0.1 S/CO RATIO (ref 0–0.9)
HGB BLD-MCNC: 11.4 G/DL (ref 11.1–15.9)
HIV 1+2 AB+HIV1 P24 AG SERPL QL IA: NON REACTIVE
IMM GRANULOCYTES # BLD: 0 X10E3/UL (ref 0–0.1)
IMM GRANULOCYTES NFR BLD: 0 %
LYMPHOCYTES # BLD AUTO: 1.4 X10E3/UL (ref 0.7–3.1)
LYMPHOCYTES NFR BLD AUTO: 16 %
MCH RBC QN AUTO: 27.5 PG (ref 26.6–33)
MCHC RBC AUTO-ENTMCNC: 33 G/DL (ref 31.5–35.7)
MCV RBC AUTO: 83 FL (ref 79–97)
MONOCYTES # BLD AUTO: 0.4 X10E3/UL (ref 0.1–0.9)
MONOCYTES NFR BLD AUTO: 5 %
NEUTROPHILS # BLD AUTO: 6.6 X10E3/UL (ref 1.4–7)
NEUTROPHILS NFR BLD AUTO: 78 %
PLATELET # BLD AUTO: 281 X10E3/UL (ref 150–450)
POTASSIUM SERPL-SCNC: 4.2 MMOL/L (ref 3.5–5.2)
PROT SERPL-MCNC: 6.9 G/DL (ref 6–8.5)
PROT UR-MCNC: 7.5 MG/DL
PROT/CREAT UR: 76 MG/G CREAT (ref 0–200)
RBC # BLD AUTO: 4.15 X10E6/UL (ref 3.77–5.28)
RH BLD: POSITIVE
RPR SER QL: NON REACTIVE
RUBV IGG SERPL IA-ACNC: 1.62 INDEX
SL AMB EGFR AFRICAN AMERICAN: 143 ML/MIN/1.73
SL AMB EGFR NON AFRICAN AMERICAN: 124 ML/MIN/1.73
SODIUM SERPL-SCNC: 134 MMOL/L (ref 134–144)
WBC # BLD AUTO: 8.5 X10E3/UL (ref 3.4–10.8)

## 2020-12-08 ENCOUNTER — TELEPHONE (OUTPATIENT)
Dept: PERINATAL CARE | Facility: CLINIC | Age: 20
End: 2020-12-08

## 2020-12-10 ENCOUNTER — TELEPHONE (OUTPATIENT)
Dept: PERINATAL CARE | Facility: OTHER | Age: 20
End: 2020-12-10

## 2020-12-11 ENCOUNTER — ROUTINE PRENATAL (OUTPATIENT)
Dept: PERINATAL CARE | Facility: OTHER | Age: 20
End: 2020-12-11
Payer: OTHER GOVERNMENT

## 2020-12-11 VITALS
HEIGHT: 69 IN | BODY MASS INDEX: 43.4 KG/M2 | DIASTOLIC BLOOD PRESSURE: 89 MMHG | WEIGHT: 293 LBS | SYSTOLIC BLOOD PRESSURE: 145 MMHG | HEART RATE: 93 BPM

## 2020-12-11 DIAGNOSIS — O99.210 MATERNAL MORBID OBESITY, ANTEPARTUM (HCC): ICD-10-CM

## 2020-12-11 DIAGNOSIS — Z3A.12 12 WEEKS GESTATION OF PREGNANCY: Primary | ICD-10-CM

## 2020-12-11 DIAGNOSIS — E66.01 MATERNAL MORBID OBESITY, ANTEPARTUM (HCC): ICD-10-CM

## 2020-12-11 DIAGNOSIS — O10.911 CHRONIC HYPERTENSION IN OBSTETRIC CONTEXT IN FIRST TRIMESTER: ICD-10-CM

## 2020-12-11 DIAGNOSIS — Z36.82 ENCOUNTER FOR ANTENATAL SCREENING FOR NUCHAL TRANSLUCENCY: ICD-10-CM

## 2020-12-11 PROCEDURE — 99212 OFFICE O/P EST SF 10 MIN: CPT | Performed by: OBSTETRICS & GYNECOLOGY

## 2020-12-11 PROCEDURE — 76813 OB US NUCHAL MEAS 1 GEST: CPT | Performed by: OBSTETRICS & GYNECOLOGY

## 2020-12-11 PROCEDURE — 3008F BODY MASS INDEX DOCD: CPT | Performed by: FAMILY MEDICINE

## 2020-12-13 LAB
GLUCOSE 1H P 100 G GLC PO SERPL-MCNC: 129 MG/DL (ref 65–179)
GLUCOSE 2H P 100 G GLC PO SERPL-MCNC: 112 MG/DL (ref 65–154)
GLUCOSE 3H P 100 G GLC PO SERPL-MCNC: 57 MG/DL (ref 65–139)
GLUCOSE P FAST SERPL-MCNC: 86 MG/DL (ref 65–94)
SL AMB NOTE:: ABNORMAL

## 2020-12-14 ENCOUNTER — DOCUMENTATION (OUTPATIENT)
Dept: PERINATAL CARE | Facility: CLINIC | Age: 20
End: 2020-12-14

## 2020-12-16 ENCOUNTER — TELEPHONE (OUTPATIENT)
Dept: PERINATAL CARE | Facility: CLINIC | Age: 20
End: 2020-12-16

## 2021-01-05 ENCOUNTER — TELEPHONE (OUTPATIENT)
Dept: PERINATAL CARE | Facility: CLINIC | Age: 21
End: 2021-01-05

## 2021-01-05 ENCOUNTER — TELEPHONE (OUTPATIENT)
Dept: OBGYN CLINIC | Facility: CLINIC | Age: 21
End: 2021-01-05

## 2021-01-05 NOTE — TELEPHONE ENCOUNTER
16w1d OB states she has chronic hypertension  She is on labetalol 200mg (at least 4 weeks) and LDASA  States BP's today 124/71 9am  repeated 130/78  10:30  States she has a headache on and off for the past week and half, along with dizziness  Took tylenol last week for headache without relief  Denies any epigastric pain, visual changes, edema, SOB, chest pain or palpations  States today and yesterday headache feels like sinus headache    Advised saline nasal spray, benadryl, allegra, Claritin or zyrtec (avoid d or decongestant),  increase fluid intake, small frequent meals, stand up slowly, and do not  one spot  Call office if symptoms do not improve or worsen  Verbalized understanding  Routing to provider for further recommendations

## 2021-01-05 NOTE — TELEPHONE ENCOUNTER
Karthik Keane left a message on the nurse line today to question if there is anything she can do for a headache she has been having  She states that she was seen in our office and is known to have elevated BP's  She is currently on labetolol and aspirin  She states that currently her headache is frontal but that sometimes it is all over her head  Her BP this a m on her home monitor was 124/71  She states that she has been taking tylenol with no relief and is wondering if there is anything else she can take  I deferred her to her OB for advice on meds she can take  She agrees to call their office

## 2021-01-15 ENCOUNTER — ROUTINE PRENATAL (OUTPATIENT)
Dept: OBGYN CLINIC | Facility: CLINIC | Age: 21
End: 2021-01-15

## 2021-01-15 VITALS — SYSTOLIC BLOOD PRESSURE: 134 MMHG | WEIGHT: 293 LBS | DIASTOLIC BLOOD PRESSURE: 86 MMHG | BODY MASS INDEX: 47.11 KG/M2

## 2021-01-15 DIAGNOSIS — Z12.4 SCREENING FOR MALIGNANT NEOPLASM OF CERVIX: ICD-10-CM

## 2021-01-15 DIAGNOSIS — Z34.01 ENCOUNTER FOR SUPERVISION OF NORMAL FIRST PREGNANCY IN FIRST TRIMESTER: ICD-10-CM

## 2021-01-15 DIAGNOSIS — O10.912 CHRONIC HYPERTENSION IN OBSTETRIC CONTEXT IN SECOND TRIMESTER: ICD-10-CM

## 2021-01-15 DIAGNOSIS — Z11.3 SCREENING FOR STD (SEXUALLY TRANSMITTED DISEASE): ICD-10-CM

## 2021-01-15 DIAGNOSIS — O99.210 MATERNAL MORBID OBESITY, ANTEPARTUM (HCC): ICD-10-CM

## 2021-01-15 DIAGNOSIS — Z3A.17 17 WEEKS GESTATION OF PREGNANCY: ICD-10-CM

## 2021-01-15 DIAGNOSIS — E66.01 MATERNAL MORBID OBESITY, ANTEPARTUM (HCC): ICD-10-CM

## 2021-01-15 DIAGNOSIS — Z34.02 ENCOUNTER FOR SUPERVISION OF NORMAL FIRST PREGNANCY IN SECOND TRIMESTER: Primary | ICD-10-CM

## 2021-01-15 PROCEDURE — 87491 CHLMYD TRACH DNA AMP PROBE: CPT | Performed by: OBSTETRICS & GYNECOLOGY

## 2021-01-15 PROCEDURE — PNV: Performed by: OBSTETRICS & GYNECOLOGY

## 2021-01-15 PROCEDURE — G0145 SCR C/V CYTO,THINLAYER,RESCR: HCPCS | Performed by: OBSTETRICS & GYNECOLOGY

## 2021-01-15 PROCEDURE — 87591 N.GONORRHOEAE DNA AMP PROB: CPT | Performed by: OBSTETRICS & GYNECOLOGY

## 2021-01-15 RX ORDER — LORATADINE 10 MG/1
10 TABLET ORAL DAILY
COMMUNITY
End: 2021-12-22 | Stop reason: ALTCHOICE

## 2021-01-16 PROBLEM — Z3A.17 17 WEEKS GESTATION OF PREGNANCY: Status: ACTIVE | Noted: 2020-11-20

## 2021-01-16 PROBLEM — O10.912 CHRONIC HYPERTENSION IN OBSTETRIC CONTEXT IN SECOND TRIMESTER: Status: ACTIVE | Noted: 2020-11-20

## 2021-01-16 NOTE — PROGRESS NOTES
19 y o    female at 17 3 wga EGA for PNV  BP : 134/86  TWG: -4lb    Patient presents for prenatal history and physical  Feeling well and has minimal complaints  Denies vaginal bleeding and abdominal pain  Denies nausea/vomiting  Tolerating oral intake  OB hx reviewed -- noncontributory  First pregnancy    Dating US reviewed prior to appt  MFM consultation performed on 2020 for early genetic screening  NIPT testing elected  Last pap unavailable for review, secondary to age  Pap smear collected today  GC/Chlamydia collected today  Chronic HTN -- patient currently on 200mg bid labetalol for BP control  BP today normotensive  Discussed with patient close monitoring of her BPs throughout pregnancy and her increased risk of exacerbation or development of hypertensive disorders of pregnancy due to her hx  Patient has BP cuff at home and is comfortable with it's use  She reports her BP measurements are similar to in office measurements  BMI>40  Discussed weight management in pregnancy  Reviewed recommendation to limit total weight gain in pregnancy to 11-20lb  Encouraged continued exercise in pregnancy at moderate strenuous activity level  Patient has new puppy and is already getting back into multiple daily walks with her dogs  Plan for NST/MELONIE weekly after 36wks  Patient oriented to practice, different providers, and different locations  Reviewed expected prenatal visit schedule  Follow up in 4 weeks

## 2021-01-20 LAB
LAB AP GYN PRIMARY INTERPRETATION: NORMAL
Lab: NORMAL

## 2021-01-23 LAB
C TRACH DNA SPEC QL NAA+PROBE: NEGATIVE
N GONORRHOEA DNA SPEC QL NAA+PROBE: NEGATIVE

## 2021-02-12 ENCOUNTER — TELEPHONE (OUTPATIENT)
Dept: PERINATAL CARE | Facility: CLINIC | Age: 21
End: 2021-02-12

## 2021-02-12 NOTE — TELEPHONE ENCOUNTER
Spoke with patient and confirmed appointment with [facility]  1 support person ( must be over age of 15) may accompany patient  Will you and your support person be able to wear a mask ,without a valve , during entire appointment? YES   To minimize your exposure in our waiting area,check in and rooming questions will be done via phone  When you arrive in the parking lot please call the following inside line # prior to entering office:    [place]: [phone]    Have you or your support person traveled outside the state in the last 2 weeks? NO  If yes, what state did you travel to? Do you or your support person have:  Fever or flu- like symptoms? NO  Symptoms of upper respiratory infection like runny nose, sore throat or cough? NO  Do you have new headache that you have not had in the past?NO  Have you experienced any new shortness of breath recently? NO  Do you have any new loss of taste or smell? NO  Do you have any new diarrhea, nausea or vomiting? NO  Have you recently been in contact with anyone who has been sick or diagnosed with COVID-19 infection? NO  Have you been recommended to quarantine because of an exposure to a confirmed positive COVID19 person? NO  Have you recently been tested for COVID19? NO    Patient verbalized understanding of all instructions   -------------------------------------------------------------

## 2021-02-13 ENCOUNTER — ROUTINE PRENATAL (OUTPATIENT)
Dept: PERINATAL CARE | Facility: CLINIC | Age: 21
End: 2021-02-13
Payer: OTHER GOVERNMENT

## 2021-02-13 VITALS
HEIGHT: 69 IN | DIASTOLIC BLOOD PRESSURE: 84 MMHG | HEART RATE: 99 BPM | WEIGHT: 293 LBS | BODY MASS INDEX: 43.4 KG/M2 | SYSTOLIC BLOOD PRESSURE: 151 MMHG

## 2021-02-13 DIAGNOSIS — O99.213 MATERNAL MORBID OBESITY IN THIRD TRIMESTER, ANTEPARTUM (HCC): ICD-10-CM

## 2021-02-13 DIAGNOSIS — Z36.86 ENCOUNTER FOR ANTENATAL SCREENING FOR CERVICAL LENGTH: ICD-10-CM

## 2021-02-13 DIAGNOSIS — O10.912 CHRONIC HYPERTENSION IN OBSTETRIC CONTEXT IN SECOND TRIMESTER: Primary | ICD-10-CM

## 2021-02-13 DIAGNOSIS — E66.01 MATERNAL MORBID OBESITY IN THIRD TRIMESTER, ANTEPARTUM (HCC): ICD-10-CM

## 2021-02-13 DIAGNOSIS — Z3A.21 21 WEEKS GESTATION OF PREGNANCY: ICD-10-CM

## 2021-02-13 PROCEDURE — 76817 TRANSVAGINAL US OBSTETRIC: CPT | Performed by: OBSTETRICS & GYNECOLOGY

## 2021-02-13 PROCEDURE — 76811 OB US DETAILED SNGL FETUS: CPT | Performed by: OBSTETRICS & GYNECOLOGY

## 2021-02-13 PROCEDURE — 99213 OFFICE O/P EST LOW 20 MIN: CPT | Performed by: OBSTETRICS & GYNECOLOGY

## 2021-02-13 NOTE — PROGRESS NOTES
Ultrasound Probe Disinfection    A transvaginal ultrasound was performed  Prior to use, disinfection was performed with High Level Disinfection Process (Trophon)  Probe serial number [id number]: [id number] was used        Jhoana Torres  02/13/21  12:43 PM

## 2021-02-13 NOTE — PROGRESS NOTES
Please refer to the [facility] ultrasound report in Ob Procedures for additional information regarding today's visit

## 2021-02-13 NOTE — LETTER
February 13, 1903     DO [address]  [address]  [place] [address]    Patient: Yany Beaulieu   YOB: 2000   Date of Visit: 2/13/2021       Dear Dr Azeem Randle:    Thank you for referring Yany Beaulieu to me for evaluation  Below are my notes for this consultation  If you have questions, please do not hesitate to call me  I look forward to following your patient along with you  Sincerely,        Jodee Duke MD        CC: No Recipients  Jodee Duke MD  2/13/2021  1:17 PM  Sign when Signing Visit   Please refer to the [facility] ultrasound report in Ob Procedures for additional information regarding today's visit

## 2021-02-18 ENCOUNTER — TELEPHONE (OUTPATIENT)
Dept: OTHER | Facility: OTHER | Age: 21
End: 2021-02-18

## 2021-02-18 NOTE — TELEPHONE ENCOUNTER
Pt called due to her getting her positive covid results today and she is suppose to have a OB visit tomm 6/35 with Dr Mayte Sol  She did want me to cancel her appt and if the office could please call her back to see if she could do a virtual visit

## 2021-02-19 ENCOUNTER — TELEMEDICINE (OUTPATIENT)
Dept: OBGYN CLINIC | Facility: CLINIC | Age: 21
End: 2021-02-19

## 2021-02-19 VITALS — BODY MASS INDEX: 47.55 KG/M2 | DIASTOLIC BLOOD PRESSURE: 84 MMHG | WEIGHT: 293 LBS | SYSTOLIC BLOOD PRESSURE: 143 MMHG

## 2021-02-19 DIAGNOSIS — O10.912 CHRONIC HYPERTENSION IN OBSTETRIC CONTEXT IN SECOND TRIMESTER: Primary | ICD-10-CM

## 2021-02-19 DIAGNOSIS — Z3A.22 22 WEEKS GESTATION OF PREGNANCY: ICD-10-CM

## 2021-02-19 PROBLEM — U07.1 COVID-19: Status: ACTIVE | Noted: 2021-02-19

## 2021-02-19 PROCEDURE — PNV: Performed by: OBSTETRICS & GYNECOLOGY

## 2021-02-19 NOTE — PROGRESS NOTES
Pt tested positive for covid  Pt would like to discuss covid in pregnancy and medications she can take  C/o congestion and headaches     BP today at home: 143/84

## 2021-02-19 NOTE — LETTER
To whom it may concerns,    Jael Castellanos tested positive for COVID 19 on 2/16/2021  It is recommended she quarantine and rest for 14 days      Thank you,    Durrell Primrose MD Georgeanne Pique

## 2021-02-19 NOTE — PROGRESS NOTES
Virtual Regular Visit      Assessment/Plan:    Problem List Items Addressed This Visit        Cardiovascular and Mediastinum    Chronic hypertension in obstetric context in second trimester - Primary       Other    22 weeks gestation of pregnancy               Reason for visit is   Chief Complaint   Patient presents with    Virtual Regular Visit    Virtual Regular Visit        Encounter provider Patito Ceja MD    Provider located at [address]  [address] [facility]   [address]  [facility] [address]  [phone]      Recent Visits  No visits were found meeting these conditions  Showing recent visits within past 7 days and meeting all other requirements     Today's Visits  Date Type Provider Dept   02/19/21 Telemedicine Patito Ceja MD [address] [facility] today's visits and meeting all other requirements     Future Appointments  No visits were found meeting these conditions  Showing future appointments within next 150 days and meeting all other requirements        The patient was identified by name and date of birth  Doug Bryan was informed that this is a telemedicine visit and that the visit is being conducted through [email or web address] and patient was informed that this is a secure, HIPAA-compliant platform  She agrees to proceed     My office door was closed  No one else was in the room  She acknowledged consent and understanding of privacy and security of the video platform  The patient has agreed to participate and understands they can discontinue the visit at any time  It was my intent to perform this visit via video technology but the patient was not able to do a video connection so the visit was completed via audio telephone only  Patient is aware this is a billable service       It was my intent to perform this visit via video technology but the patient was not able to do a video connection so the visit was completed via audio telephone only  Subjective  Jean Pierre Adrian is a 19 y o  female   Patient was diagnosed with COVID  Heart test was done on   She will quarantine for 14 days  Reviewed that she should go to the ER if she has shortness of breath or chest pain  Reviewed that she can use nasal spray as well as Mucinex  Reviewed  labor precautions   Chronic hypertension-well controlled on labetalol 200 mg b i d  Follow-up in 2 weeks    Past Medical History:   Diagnosis Date    Anxiety     Congenital pes planus of both feet     Resolved      Difficulty walking     Resolved 2017     Eczema     Hypertension     Mass of hand, left     Resolved 2017     Obesity 2016    Pain     chronic back pain    Varicella     vaccine       Past Surgical History:   Procedure Laterality Date    FL INJECTION LEFT HIP (ARTHROGRAM)  2018    HAND SURGERY      WISDOM TOOTH EXTRACTION         Current Outpatient Medications   Medication Sig Dispense Refill    aspirin (ECOTRIN LOW STRENGTH) 81 mg EC tablet Take 2 tablets (162 mg total) by mouth daily 180 tablet 3    labetalol (NORMODYNE) 200 mg tablet Take 1 tablet (200 mg total) by mouth 2 (two) times a day 180 tablet 1    loratadine (CLARITIN) 10 mg tablet Take 10 mg by mouth daily      Prenatal MV & Min w/FA-DHA (Prenatal Adult Gummy/DHA/FA) 0 4-25 MG CHEW Chew 2 tablets daily       No current facility-administered medications for this visit  No Known Allergies    Video Exam    Vitals:    21 1416   BP: 143/84   Weight: (!) 146 kg (322 lb)   I spent 9 minutes directly with the patient during this visit      VIRTUAL VISIT DISCLAIMER    Jean Pierre Adrian acknowledges that she has consented to an online visit or consultation   She understands that the online visit is based solely on information provided by her, and that, in the absence of a face-to-face physical evaluation by the physician, the diagnosis she receives is both limited and provisional in terms of accuracy and completeness  This is not intended to replace a full medical face-to-face evaluation by the physician  Daxa William understands and accepts these terms

## 2021-03-02 ENCOUNTER — HOSPITAL ENCOUNTER (OUTPATIENT)
Facility: HOSPITAL | Age: 21
Discharge: HOME | End: 2021-03-02
Attending: OBSTETRICS & GYNECOLOGY | Admitting: OBSTETRICS & GYNECOLOGY
Payer: OTHER GOVERNMENT

## 2021-03-02 ENCOUNTER — TELEPHONE (OUTPATIENT)
Dept: OBGYN CLINIC | Facility: CLINIC | Age: 21
End: 2021-03-02

## 2021-03-02 VITALS
TEMPERATURE: 98.4 F | SYSTOLIC BLOOD PRESSURE: 141 MMHG | DIASTOLIC BLOOD PRESSURE: 78 MMHG | HEART RATE: 84 BPM | RESPIRATION RATE: 18 BRPM

## 2021-03-02 DIAGNOSIS — O10.912 CHRONIC HYPERTENSION IN OBSTETRIC CONTEXT IN SECOND TRIMESTER: ICD-10-CM

## 2021-03-02 DIAGNOSIS — O99.012 ANEMIA AFFECTING PREGNANCY IN SECOND TRIMESTER: Primary | ICD-10-CM

## 2021-03-02 DIAGNOSIS — Z3A.22 22 WEEKS GESTATION OF PREGNANCY: ICD-10-CM

## 2021-03-02 PROBLEM — Z3A.24 24 WEEKS GESTATION OF PREGNANCY: Status: ACTIVE | Noted: 2021-02-19

## 2021-03-02 LAB
ALBUMIN SERPL BCP-MCNC: 2.5 G/DL (ref 3.5–5)
ALP SERPL-CCNC: 88 U/L (ref 46–116)
ALT SERPL W P-5'-P-CCNC: 24 U/L (ref 12–78)
ANION GAP SERPL CALCULATED.3IONS-SCNC: 8 MMOL/L (ref 4–13)
AST SERPL W P-5'-P-CCNC: 19 U/L (ref 5–45)
BASOPHILS # BLD AUTO: 0.01 THOUSANDS/ΜL (ref 0–0.1)
BASOPHILS NFR BLD AUTO: 0 % (ref 0–1)
BILIRUB SERPL-MCNC: 0.2 MG/DL (ref 0.2–1)
BILIRUB UR QL STRIP: NEGATIVE
BUN SERPL-MCNC: 7 MG/DL (ref 5–25)
CALCIUM ALBUM COR SERPL-MCNC: 9.8 MG/DL (ref 8.3–10.1)
CALCIUM SERPL-MCNC: 8.6 MG/DL (ref 8.3–10.1)
CHLORIDE SERPL-SCNC: 105 MMOL/L (ref 100–108)
CLARITY UR: CLEAR
CO2 SERPL-SCNC: 25 MMOL/L (ref 21–32)
COLOR UR: YELLOW
CREAT SERPL-MCNC: 0.71 MG/DL (ref 0.6–1.3)
CREAT UR-MCNC: 30 MG/DL
EOSINOPHIL # BLD AUTO: 0.07 THOUSAND/ΜL (ref 0–0.61)
EOSINOPHIL NFR BLD AUTO: 1 % (ref 0–6)
ERYTHROCYTE [DISTWIDTH] IN BLOOD BY AUTOMATED COUNT: 12.7 % (ref 11.6–15.1)
GFR SERPL CREATININE-BSD FRML MDRD: 120 ML/MIN/1.73SQ M
GLUCOSE SERPL-MCNC: 81 MG/DL (ref 65–140)
GLUCOSE UR STRIP-MCNC: NEGATIVE MG/DL
HCT VFR BLD AUTO: 30.1 % (ref 34.8–46.1)
HGB BLD-MCNC: 9.4 G/DL (ref 11.5–15.4)
HGB UR QL STRIP.AUTO: NEGATIVE
IMM GRANULOCYTES # BLD AUTO: 0.08 THOUSAND/UL (ref 0–0.2)
IMM GRANULOCYTES NFR BLD AUTO: 1 % (ref 0–2)
KETONES UR STRIP-MCNC: NEGATIVE MG/DL
LEUKOCYTE ESTERASE UR QL STRIP: NEGATIVE
LYMPHOCYTES # BLD AUTO: 1.58 THOUSANDS/ΜL (ref 0.6–4.47)
LYMPHOCYTES NFR BLD AUTO: 18 % (ref 14–44)
MCH RBC QN AUTO: 26.6 PG (ref 26.8–34.3)
MCHC RBC AUTO-ENTMCNC: 31.2 G/DL (ref 31.4–37.4)
MCV RBC AUTO: 85 FL (ref 82–98)
MONOCYTES # BLD AUTO: 0.44 THOUSAND/ΜL (ref 0.17–1.22)
MONOCYTES NFR BLD AUTO: 5 % (ref 4–12)
NEUTROPHILS # BLD AUTO: 6.44 THOUSANDS/ΜL (ref 1.85–7.62)
NEUTS SEG NFR BLD AUTO: 75 % (ref 43–75)
NITRITE UR QL STRIP: NEGATIVE
NRBC BLD AUTO-RTO: 0 /100 WBCS
PH UR STRIP.AUTO: 6.5 [PH]
PLATELET # BLD AUTO: 341 THOUSANDS/UL (ref 149–390)
PMV BLD AUTO: 9.5 FL (ref 8.9–12.7)
POTASSIUM SERPL-SCNC: 3.9 MMOL/L (ref 3.5–5.3)
PROT SERPL-MCNC: 6.5 G/DL (ref 6.4–8.2)
PROT UR STRIP-MCNC: NEGATIVE MG/DL
PROT UR-MCNC: <6 MG/DL
PROT/CREAT UR: <0.2 MG/G{CREAT} (ref 0–0.1)
RBC # BLD AUTO: 3.53 MILLION/UL (ref 3.81–5.12)
SODIUM SERPL-SCNC: 138 MMOL/L (ref 136–145)
SP GR UR STRIP.AUTO: 1.01 (ref 1–1.03)
UROBILINOGEN UR QL STRIP.AUTO: 0.2 E.U./DL
WBC # BLD AUTO: 8.62 THOUSAND/UL (ref 4.31–10.16)

## 2021-03-02 PROCEDURE — 84156 ASSAY OF PROTEIN URINE: CPT | Performed by: OBSTETRICS & GYNECOLOGY

## 2021-03-02 PROCEDURE — 85025 COMPLETE CBC W/AUTO DIFF WBC: CPT | Performed by: OBSTETRICS & GYNECOLOGY

## 2021-03-02 PROCEDURE — NC001 PR NO CHARGE: Performed by: OBSTETRICS & GYNECOLOGY

## 2021-03-02 PROCEDURE — 82570 ASSAY OF URINE CREATININE: CPT | Performed by: OBSTETRICS & GYNECOLOGY

## 2021-03-02 PROCEDURE — 81003 URINALYSIS AUTO W/O SCOPE: CPT | Performed by: OBSTETRICS & GYNECOLOGY

## 2021-03-02 PROCEDURE — 99213 OFFICE O/P EST LOW 20 MIN: CPT

## 2021-03-02 PROCEDURE — 80053 COMPREHEN METABOLIC PANEL: CPT | Performed by: OBSTETRICS & GYNECOLOGY

## 2021-03-02 RX ORDER — FERROUS SULFATE TAB EC 324 MG (65 MG FE EQUIVALENT) 324 (65 FE) MG
324 TABLET DELAYED RESPONSE ORAL
Qty: 90 TABLET | Refills: 3 | Status: SHIPPED | OUTPATIENT
Start: 2021-03-02 | End: 2021-06-06

## 2021-03-02 RX ORDER — DOCUSATE SODIUM 100 MG/1
100 CAPSULE, LIQUID FILLED ORAL 2 TIMES DAILY
Qty: 10 CAPSULE | Refills: 3 | Status: SHIPPED | OUTPATIENT
Start: 2021-03-02 | End: 2021-07-06 | Stop reason: ALTCHOICE

## 2021-03-02 NOTE — DISCHARGE INSTRUCTIONS
Hypertension During Pregnancy   AMBULATORY CARE:   What you need to know about hypertension during pregnancy:  Hypertension is high blood pressure (BP)  Normal BP is 119/79 or lower  Hypertension during pregnancy is a BP of 140/90 or higher  Severe hypertension is at least 160/110  One or both numbers of these readings may be high  Hypertension may start before you become pregnant, or develop during pregnancy  Pregnancy can cause high BP, or it may develop because of other risk factors you had before you became pregnant  It is important to get screened and treated for an elevated BP or hypertension during pregnancy  This can prevent problems for you and your baby  Types of hypertension during pregnancy:   · Chronic hypertension  is high BP that starts before pregnancy or develops within the first 20 weeks and does not go away after delivery  · Gestational hypertension  means you develop new high BP after week 20 of your pregnancy  Gestational hypertension usually goes away after delivery, but it increases your risk for future chronic hypertension  · Chronic hypertension with superimposed preeclampsia  is preeclampsia in a woman with a history of hypertension before pregnancy  It can also be preeclampsia that develops before week 20 of pregnancy  · Preeclampsia  is high BP that usually develops after week 20 of pregnancy  It can also develop within 4 weeks of delivery  You may also have protein in your urine or damage to organs such as your kidneys or liver  Preeclampsia can lead to life-threatening conditions such as a stroke or eclampsia (seizures from severe high BP)  · HELLP syndrome  is a life-threatening complication of high BP that may develop between week 20 of pregnancy and a few days after delivery  It causes destruction of red blood cells, liver problems, and blood clotting problems  Your risk for HELLP syndrome increases if you have a history of preeclampsia      Signs and symptoms  do not always happen with high BP  Hypertension may only be found during routine pregnancy checkups  You may have any of the following, depending on the kind of hypertension you have:  · Headache or confusion    · Spotted or blurred vision    · Chest pain, trouble breathing, or a fast heartbeat    · Dizziness or weakness    · Abdominal pain, or pain on the right side of your upper abdomen, behind the ribs    · Nausea and vomiting    · Ringing or buzzing in your ears    · Sudden weight gain or swelling in your face, arms, or legs    · Severe fatigue that does not get better with rest    Call your local emergency number (911 in the [address]), or have someone else call if:   · You have a seizure  · You have chest pain  · You faint or lose consciousness  · You have any of the following signs of a heart attack:      ? Squeezing, pressure, or pain in your chest    ? You may  also have any of the following:     § Discomfort or pain in your back, neck, jaw, stomach, or arm    § Shortness of breath    § Nausea or vomiting    § Lightheadedness or a sudden cold sweat    Seek care immediately if:   · You have a severe headache or vision loss  · You have weakness in an arm or leg  · You have fluid or blood leaking from your vagina that does not stop  · You feel a gush of fluid from your vagina  · You feel a change in your baby's movement, or you feel fewer than 6 to 10 movements in an hour  Call your doctor or obstetrician if:   · You urinate less than usual or stop urinating  · You have severe abdominal pain with or without nausea and vomiting  · You have new or increased swelling in your face or hands, or sudden weight gain  · You have questions or concerns about your condition or care  How hypertension during pregnancy is diagnosed and treated: Your healthcare provider will ask about your symptoms  He or she will check your BP 2 times, at least 4 hours apart   You may need blood or urine tests to check for problems caused by hypertension  Treatment depends on how high your BP is and how many weeks you are into your pregnancy  Before 37 weeks, healthcare providers may want to monitor your condition if your BP is not severely high  An ultrasound may be done every 3 to 4 weeks to check your baby's growth  The amount of amniotic fluid may be measured every week  Your provider will tell you how often to come in for tests  Treatment may include any of the following:  · Medicine  may be given to lower your BP or prevent seizures  The dose of current BP medicine you take may need to be changed  Daily low-dose aspirin may be recommended if you are at high risk for preeclampsia  Aspirin may help prevent preeclampsia or problems that it can cause  Your healthcare provider will tell you if you should take aspirin, and when to start  It is usually recommended from week 12 of pregnancy until delivery  Do not take aspirin unless directed by your healthcare provider  · Ultrasounds  are used to check your baby's growth and make sure he or she is healthy  · Delivery  may be recommended  Delivery may stop high BP or problems such as preeclampsia  Healthcare providers may deliver your baby right away if he or she is full-term (37 weeks or more)  You may need to deliver your baby early if you or the baby has life-threatening symptoms  Manage hypertension during pregnancy:  Your healthcare providers will tell you what BP is best for you during your pregnancy  They will help you create a plan to lower your BP safely and keep it at recommended levels  This is based on the kind of hypertension you have and how high your BP is  Sometimes it is difficult to diagnose a specific kind of hypertension, but you can still follow general guidelines:  · Go to all scheduled appointments  Your healthcare providers will check your blood pressure and may order other tests  · Rest as directed    Your healthcare provider may tell you to rest more often if you have mild symptoms of preeclampsia  · Check your BP as directed if you have chronic hypertension  Sit and rest for 5 minutes before you take your BP  Extend your arm and support it on a flat surface  Your arm should be at the same level as your heart  Follow the directions that came with your BP monitor  Take your BP as often as directed  Keep a record of your BP readings and bring it to your follow-up visits  · Do not drink alcohol or smoke  Alcohol, nicotine, and other chemicals in cigarettes and cigars can increase your BP  They can also harm your baby  Ask your healthcare provider for information if you currently drink alcohol or smoke and need help to quit  E-cigarettes or smokeless tobacco still contain nicotine  Talk to your healthcare provider before you use these products  · Eat healthy foods  Healthy foods can help control your BP  Healthy foods include fruits, vegetables, whole-grain breads, low-fat dairy products, beans, lean meats, and fish  Ask if you need to be on a special diet  · Exercise if directed  Exercise can help lower your BP  Ask your healthcare provider how much exercise you need and which exercise is right for you  · Do kick counts as directed  You may need to keep track of how often your baby moves or kicks over a certain amount of time  Ask your obstetrician how to do kick counts and how often to do them  · Check your weight each day  Weigh yourself every day before breakfast  Weight gain can be a sign of extra fluid in your body  Follow up with your doctor or obstetrician as directed:  Write down your questions so you remember to ask them during your visits  © Copyright [address] Information is for End User's use only and may not be sold, redistributed or otherwise used for commercial purposes   All illustrations and images included in CareNotes® are the copyrighted property of [employer]  or [employer]  The above information is an  only  It is not intended as medical advice for individual conditions or treatments  Talk to your doctor, nurse or pharmacist before following any medical regimen to see if it is safe and effective for you

## 2021-03-02 NOTE — TELEPHONE ENCOUNTER
Pt  L/m regarding high BPs  C/o pounding headache and light sensitivity, declines vision changes  Pt  Has cHTN taking Labetalol 200MG BID  (taking around 0800 and 2000)       03/01/2021  1930-179/107  2100-144/101    03/02/2021  0900-142/100  1200-144/76

## 2021-03-02 NOTE — TELEPHONE ENCOUNTER
Spoke with patient, verbalized understanding  She will head to L&D per recommendation  L&D notified, expecting patient

## 2021-03-02 NOTE — PROGRESS NOTES
P/C <0 2  CMP wnl  CBC significant for anemia with H/H of 9 4/30 1, Platelet count normal @ 341  PO iron intake encouraged  Continue Labetalol 200mg PO BID  Preeclampsia warning signs reviewed  Has perinatology appointment on 3/5/21  Has OB appointment 3/26/21  Discussed with Dr Chiquita Kinnier Sherren Phoenix, MD  PGY-1, OB/GYN  3/2/2021   6:23 PM

## 2021-03-02 NOTE — PROGRESS NOTES
Triage Note - OB  Mayuri Pollard 19 y o  female MRN: [id number]  Unit/Bed#: LD TRIAGE 4- Encounter: [id number]    OB TRIAGE NOTE  Mayuri Pollard  [id number]  3/2/2021  5:11 PM  LD TRIAGE 4/LD TRIAGE 4-*    ASSESS:  19 y o   24w1d with a history of CHTN, presenting with elevated blood pressures at home and a headache    PLAN  #1  Elevated BP:   · 145/90 on admission  · Headache improving, but still present; patient declines further treatment at this time  · CBC, CMP, urine P/C ratio, UA ordered    D/w Dr Janes Baker  ______________    SUBJECTIVE    BEATRIZ: Estimated Date of Delivery: 21    HPI Chronology:  19 y o   24w1d with a history of CHTN on 200 mg labetalol BID, presents with a headache and elevated blood pressures at home  She reports that she often gets headaches, which usually resolve with rest and hydration  She took a Tylenol earlier today at the suggestion of her doctor, which did improve her headache but did not completely resolve it  She declines any further headache treatment at this time  She also reports that she measures a blood pressure at home of 179/107, and 140/90 on recheck  She reports that her BP at home are generally 130-140 / , and she is taking her labetalol regularly  She reports that she saw some spots in her vision after she looked at a bright light earlier today, but denies any other visual changes  She denies any chest pain, shortness of breath, RUQ or epigastric pain  She denies any contractions, cramping, vaginal bleeding or other discharge  She reports normal fetal movement  Vitals:   /90   Pulse (!) 107   Temp 98 4 °F (36 9 °C) (Oral)   Resp 18   LMP 2020   There is no height or weight on file to calculate BMI  Physical Exam  Constitutional:       Appearance: She is well-developed  She is obese  HENT:      Head: Normocephalic and atraumatic     Eyes:      Conjunctiva/sclera: Conjunctivae normal    Neck:      Musculoskeletal: Normal range of motion and neck supple  Cardiovascular:      Rate and Rhythm: Normal rate and regular rhythm  Heart sounds: Normal heart sounds  Pulmonary:      Effort: Pulmonary effort is normal  No respiratory distress  Breath sounds: Normal breath sounds  Abdominal:      Palpations: Abdomen is soft  Tenderness: There is no abdominal tenderness  There is no guarding or rebound  Musculoskeletal:         General: No tenderness  Skin:     General: Skin is warm and dry  Neurological:      Mental Status: She is alert and oriented to person, place, and time  Psychiatric:         Behavior: Behavior normal          Thought Content: Thought content normal          FHT:   Baseline 140 bpm, moderate variability, 10x10 accels, no decels     TOCO:    No contractions    Labs: No results found for this or any previous visit (from the past 24 hour(s))  Lab, Imaging and other studies: I have personally reviewed pertinent reports          Jim Ospina MD  OB/GYN PGY-2  3/2/2021  5:11 PM

## 2021-03-04 ENCOUNTER — TELEPHONE (OUTPATIENT)
Dept: PERINATAL CARE | Facility: CLINIC | Age: 21
End: 2021-03-04

## 2021-03-04 NOTE — TELEPHONE ENCOUNTER
Attempted to reach patient by phone and left voicemail to confirm appointment for MFM ultrasound  1 support person ( must be over the age of 15) may accompany you for your appointment  If you or your support person have traveled outside the state in the past 2 weeks, please call and notify our office today #[phone]  You and your support person must wear a mask ,covering nose and mouth,during your entire visit  To minimize your exposure in our waiting room, please call our office prior to entering the building  Check in and rooming questions will be done via phone  We will give you directions when to enter for your appointment  [place]: [phone]    IF you are not feeling well- cough, fever, shortness of breath or any flu like symptoms, contact your primary care physician or [phone]  If you are awaiting COVID 19 test results please call and reschedule your appointment    Any questions with these instructions please call Maternal Fetal Medicine nurse line today @ # [phone]

## 2021-03-05 ENCOUNTER — ULTRASOUND (OUTPATIENT)
Dept: PERINATAL CARE | Facility: OTHER | Age: 21
End: 2021-03-05
Payer: OTHER GOVERNMENT

## 2021-03-05 VITALS
HEART RATE: 108 BPM | SYSTOLIC BLOOD PRESSURE: 126 MMHG | HEIGHT: 69 IN | BODY MASS INDEX: 43.4 KG/M2 | DIASTOLIC BLOOD PRESSURE: 70 MMHG | WEIGHT: 293 LBS

## 2021-03-05 DIAGNOSIS — Z3A.24 24 WEEKS GESTATION OF PREGNANCY: ICD-10-CM

## 2021-03-05 DIAGNOSIS — O10.912 CHRONIC HYPERTENSION IN OBSTETRIC CONTEXT IN SECOND TRIMESTER: Primary | ICD-10-CM

## 2021-03-05 PROCEDURE — 76816 OB US FOLLOW-UP PER FETUS: CPT | Performed by: OBSTETRICS & GYNECOLOGY

## 2021-03-05 PROCEDURE — 99213 OFFICE O/P EST LOW 20 MIN: CPT | Performed by: OBSTETRICS & GYNECOLOGY

## 2021-03-05 NOTE — PROGRESS NOTES
The patient was seen today for an ultrasound  Please see ultrasound report (located under Ob Procedures) for additional details  Thank you very much for allowing us to participate in the care of this very nice patient  Should you have any questions, please do not hesitate to contact me  Raza Luo MD [address] [facility]  Attending Physician, Juliana

## 2021-03-19 ENCOUNTER — ULTRASOUND (OUTPATIENT)
Dept: PERINATAL CARE | Facility: OTHER | Age: 21
End: 2021-03-19
Payer: OTHER GOVERNMENT

## 2021-03-19 VITALS
WEIGHT: 293 LBS | DIASTOLIC BLOOD PRESSURE: 78 MMHG | BODY MASS INDEX: 43.4 KG/M2 | HEIGHT: 69 IN | HEART RATE: 76 BPM | SYSTOLIC BLOOD PRESSURE: 138 MMHG

## 2021-03-19 DIAGNOSIS — O99.210 MATERNAL MORBID OBESITY, ANTEPARTUM (HCC): ICD-10-CM

## 2021-03-19 DIAGNOSIS — E66.01 MATERNAL MORBID OBESITY, ANTEPARTUM (HCC): ICD-10-CM

## 2021-03-19 DIAGNOSIS — Z3A.26 26 WEEKS GESTATION OF PREGNANCY: Primary | ICD-10-CM

## 2021-03-19 DIAGNOSIS — U07.1 COVID-19 AFFECTING PREGNANCY IN SECOND TRIMESTER: ICD-10-CM

## 2021-03-19 DIAGNOSIS — O10.912 CHRONIC HYPERTENSION IN OBSTETRIC CONTEXT IN SECOND TRIMESTER: ICD-10-CM

## 2021-03-19 DIAGNOSIS — O98.512 COVID-19 AFFECTING PREGNANCY IN SECOND TRIMESTER: ICD-10-CM

## 2021-03-19 PROCEDURE — 76816 OB US FOLLOW-UP PER FETUS: CPT | Performed by: OBSTETRICS & GYNECOLOGY

## 2021-03-19 PROCEDURE — 99213 OFFICE O/P EST LOW 20 MIN: CPT | Performed by: OBSTETRICS & GYNECOLOGY

## 2021-03-19 NOTE — PROGRESS NOTES
Mayuri Pollard  has no complaints today  She reports fetal movements and does not report any vaginal bleeding or signs of labor  Problem list:  1  Chronic hypertension on aspirin and labetalol 200 milligrams twice a day  2  Increased BMI  3  History of a COVID infection approximately 1 month ago  4  Maternal anemia with hemoglobin 9 4 currently on iron  5  She failed her early Glucola and completed a normal 3 hour glucose tolerance test    Ultrasound findings: The ultrasound today shows normal interval fetal growth and fluid  The prior missed anatomy was seen today and appears normal     Pregnancy ultrasound has limitations and is unable to detect all forms of fetal congenital abnormalities  Specific counseling was provided on the following problems:  1  Based on my review of the information below,  I would encourage vaccination to prevent her from developing a severe COVID virus infection and the resultant maternal and fetal complications that can arise if she develops a severe infection  Maternal vaccination may also supply antibodies to prevent a  infection  CDCs summary on the COVID vaccination in pregnancy and breast feeding: DyeTool be       [email or web address]      Recommend a follow-up ultrasound at 34 weeks for growth  Recommend starting twice weekly NSTs and weekly MELONIE is from 32 weeks on  Recommend planning delivery between 39 and 40 weeks for the indication of CHTN on medication  Pre visit time reviewing her records   5 minutes  Face to face time 10 minutes  Post visit time on documentation of note, updating her problem list, adding orders and prescriptions 5 minutes  Procedures that were completed today were charged separately     The level of decision making was straight forward, low level complexity, moderate complexity, high complexity    Saundra Espana, MD

## 2021-03-19 NOTE — LETTER
2021     [address] [facility] MD [address]  [address] [facility]    Patient: Emily Everett   YOB: 2000   Date of Visit: 3/19/2021       Dear Dr Joss Cr: Thank you for referring Emily Everett to me for evaluation  Below are my notes for this consultation  If you have questions, please do not hesitate to call me  I look forward to following your patient along with you  Sincerely,        Tori Dejesus MD        CC: No Recipients  Tori Dejesus MD  3/19/2021  6:59 PM  Sign when Signing Visit  Emily Everett  has no complaints today  She reports fetal movements and does not report any vaginal bleeding or signs of labor  Problem list:  1  Chronic hypertension on aspirin and labetalol 200 milligrams twice a day  2  Increased BMI  3  History of a COVID infection approximately 1 month ago  4  Maternal anemia with hemoglobin 9 4 currently on iron  5  She failed her early Glucola and completed a normal 3 hour glucose tolerance test    Ultrasound findings: The ultrasound today shows normal interval fetal growth and fluid  The prior missed anatomy was seen today and appears normal     Pregnancy ultrasound has limitations and is unable to detect all forms of fetal congenital abnormalities  Specific counseling was provided on the following problems:  1  Based on my review of the information below,  I would encourage vaccination to prevent her from developing a severe COVID virus infection and the resultant maternal and fetal complications that can arise if she develops a severe infection  Maternal vaccination may also supply antibodies to prevent a  infection  CDCs summary on the COVID vaccination in pregnancy and breast feeding: DyeTool be       [email or web address] [email or web address]      Recommend a follow-up ultrasound at 34 weeks for growth  Recommend starting twice weekly NSTs and weekly MELONIE is from 32 weeks on  Recommend planning delivery between 39 and 40 weeks for the indication of CHTN on medication  Pre visit time reviewing her records   5 minutes  Face to face time 10 minutes  Post visit time on documentation of note, updating her problem list, adding orders and prescriptions 5 minutes  Procedures that were completed today were charged separately     The level of decision making was straight forward, low level complexity, moderate complexity, high complexity    Adam Riley MD

## 2021-03-24 PROBLEM — Z3A.27 27 WEEKS GESTATION OF PREGNANCY: Status: ACTIVE | Noted: 2021-02-19

## 2021-03-25 ENCOUNTER — ROUTINE PRENATAL (OUTPATIENT)
Dept: OBGYN CLINIC | Facility: CLINIC | Age: 21
End: 2021-03-25
Payer: OTHER GOVERNMENT

## 2021-03-25 VITALS — DIASTOLIC BLOOD PRESSURE: 82 MMHG | BODY MASS INDEX: 48.58 KG/M2 | SYSTOLIC BLOOD PRESSURE: 134 MMHG | WEIGHT: 293 LBS

## 2021-03-25 DIAGNOSIS — Z3A.27 27 WEEKS GESTATION OF PREGNANCY: Primary | ICD-10-CM

## 2021-03-25 DIAGNOSIS — Z23 NEED FOR TDAP VACCINATION: ICD-10-CM

## 2021-03-25 DIAGNOSIS — O99.012 ANEMIA AFFECTING PREGNANCY IN SECOND TRIMESTER: ICD-10-CM

## 2021-03-25 DIAGNOSIS — O10.912 CHRONIC HYPERTENSION IN OBSTETRIC CONTEXT IN SECOND TRIMESTER: ICD-10-CM

## 2021-03-25 PROCEDURE — PNV: Performed by: NURSE PRACTITIONER

## 2021-03-25 PROCEDURE — 90715 TDAP VACCINE 7 YRS/> IM: CPT | Performed by: NURSE PRACTITIONER

## 2021-03-25 PROCEDURE — 90471 IMMUNIZATION ADMIN: CPT | Performed by: NURSE PRACTITIONER

## 2021-03-25 NOTE — PROGRESS NOTES
Pt has no problems  Urine- glucose and protein negative  TDAP given in left deltoid without difficulty, pt tolerated well

## 2021-03-25 NOTE — PROGRESS NOTES
Here for OB follow up visit at 27 3 weeks today  TWG 6 lbs  Monitors her BP at home and has been stable since last visit to L&D  Denies HA, dizziness, blurry vision or epigastric pain  3/19 MFM visit at 27 6 weeks  Vertex  Normal fetal growth  Anterior placenta grade II  EFW 66%  Normal MELONIE  Denies LOF, vaginal bleeding or contraction  Discussed true vs false labor  TDAP given today  3/2/21 H/H  9  1  Taking iron twice daily with vit C  Req given for 28 week labs  MFM appt at 32 weeks  Will start twice weekly  testing at 32 weeks between here and MFM  Recovered from covid     Compliant with ASA and labetolol  RTO in 3 weeks

## 2021-03-30 ENCOUNTER — TELEPHONE (OUTPATIENT)
Dept: OBGYN CLINIC | Facility: CLINIC | Age: 21
End: 2021-03-30

## 2021-03-30 DIAGNOSIS — M62.838 MUSCLE SPASM: Primary | ICD-10-CM

## 2021-03-30 RX ORDER — CYCLOBENZAPRINE HCL 5 MG
5 TABLET ORAL 3 TIMES DAILY PRN
Qty: 12 TABLET | Refills: 0 | Status: SHIPPED | OUTPATIENT
Start: 2021-03-30 | End: 2022-01-03

## 2021-03-30 NOTE — TELEPHONE ENCOUNTER
States if she feels ctx she drinks water and it goes away  States she would like flexeril sent to pharmacy  Advised to be careful of dose of tylenol, no more than 3000 in a 24 hour period  States she only took that one dose  Verbalized understanding  Pharmacy updated  Routing to provider to order flexeril

## 2021-03-30 NOTE — TELEPHONE ENCOUNTER
It sounds more musculoskeletal than OB  Can send flexeril to see if that helps  Any cramping/contraction like pain? Continue heating pad, be careful with dose of tylenol

## 2021-03-30 NOTE — TELEPHONE ENCOUNTER
28w1d OB states she woke up in the middle of the night with right lower back radiated to right middle abdomen (constant dull pain with worsens with movement)  Feels like a constant cramping  Denies any urinary frequency, urgency or dysuria  She is having regular BM - started on iron BID which has changed BM's a bit  She is taking stool softener (started yesterday)  Used heating pad on back which has helped  Working from home today, but has not taken heating pad off, only thing that helps back  Extra strength 1000 mg around 9 am no relief  States pain right now is 7-8/10  Routing to provider for advice

## 2021-04-03 ENCOUNTER — TELEPHONE (OUTPATIENT)
Dept: OTHER | Facility: OTHER | Age: 21
End: 2021-04-03

## 2021-04-05 LAB
BASOPHILS # BLD AUTO: 0 X10E3/UL (ref 0–0.2)
BASOPHILS NFR BLD AUTO: 0 %
EOSINOPHIL # BLD AUTO: 0.1 X10E3/UL (ref 0–0.4)
EOSINOPHIL NFR BLD AUTO: 1 %
ERYTHROCYTE [DISTWIDTH] IN BLOOD BY AUTOMATED COUNT: 13.1 % (ref 11.7–15.4)
GLUCOSE 1H P 50 G GLC PO SERPL-MCNC: 156 MG/DL (ref 65–139)
HCT VFR BLD AUTO: 27.1 % (ref 34–46.6)
HGB BLD-MCNC: 8.9 G/DL (ref 11.1–15.9)
IMM GRANULOCYTES # BLD: 0.1 X10E3/UL (ref 0–0.1)
IMM GRANULOCYTES NFR BLD: 1 %
LYMPHOCYTES # BLD AUTO: 0.9 X10E3/UL (ref 0.7–3.1)
LYMPHOCYTES NFR BLD AUTO: 8 %
MCH RBC QN AUTO: 27.5 PG (ref 26.6–33)
MCHC RBC AUTO-ENTMCNC: 32.8 G/DL (ref 31.5–35.7)
MCV RBC AUTO: 84 FL (ref 79–97)
MONOCYTES # BLD AUTO: 0.5 X10E3/UL (ref 0.1–0.9)
MONOCYTES NFR BLD AUTO: 5 %
NEUTROPHILS # BLD AUTO: 9.8 X10E3/UL (ref 1.4–7)
NEUTROPHILS NFR BLD AUTO: 85 %
PLATELET # BLD AUTO: 263 X10E3/UL (ref 150–450)
RBC # BLD AUTO: 3.24 X10E6/UL (ref 3.77–5.28)
RPR SER QL: NON REACTIVE
WBC # BLD AUTO: 11.4 X10E3/UL (ref 3.4–10.8)

## 2021-04-06 DIAGNOSIS — R73.09 ELEVATED GLUCOSE: ICD-10-CM

## 2021-04-06 DIAGNOSIS — Z3A.29 29 WEEKS GESTATION OF PREGNANCY: Primary | ICD-10-CM

## 2021-04-07 ENCOUNTER — TELEPHONE (OUTPATIENT)
Dept: OBGYN CLINIC | Facility: CLINIC | Age: 21
End: 2021-04-07

## 2021-04-07 DIAGNOSIS — O99.019 IRON DEFICIENCY ANEMIA OF MOTHER DURING PREGNANCY: Primary | ICD-10-CM

## 2021-04-07 DIAGNOSIS — D50.9 IRON DEFICIENCY ANEMIA OF MOTHER DURING PREGNANCY: Primary | ICD-10-CM

## 2021-04-07 RX ORDER — SODIUM CHLORIDE 9 MG/ML
20 INJECTION, SOLUTION INTRAVENOUS ONCE
Status: CANCELLED | OUTPATIENT
Start: 2021-04-15

## 2021-04-07 NOTE — TELEPHONE ENCOUNTER
----- Message from Odette Ventura Casia  sent at 4/6/2021  6:48 PM EDT -----  Please call the patient regarding her abnormal result  Negative syphilis test  Blood count has dropped further  Please schedule for venofer infusion  Please touch base with me about this order if needed  She is also due for 3 hour GTT after 10-12 hour fast  Order in EMR

## 2021-04-07 NOTE — TELEPHONE ENCOUNTER
Advised RPR negative, blood count has dropped even further, will need to start venofer infusions  She would like to go to [facility] for infusions  Advised elevated 1hr glucose she will need to do 3hr GTT  States she uses [facility] (note placed in appt to print and give her the script)  Reviewed directions for 3hr GTT  Jean Zavala will call her after setting up Venofer     Routing to provider to order venofer

## 2021-04-09 ENCOUNTER — ROUTINE PRENATAL (OUTPATIENT)
Dept: OBGYN CLINIC | Facility: CLINIC | Age: 21
End: 2021-04-09

## 2021-04-09 VITALS — DIASTOLIC BLOOD PRESSURE: 88 MMHG | BODY MASS INDEX: 47.4 KG/M2 | WEIGHT: 293 LBS | SYSTOLIC BLOOD PRESSURE: 120 MMHG

## 2021-04-09 DIAGNOSIS — O99.213 OBESITY AFFECTING PREGNANCY IN THIRD TRIMESTER: Primary | ICD-10-CM

## 2021-04-09 DIAGNOSIS — U07.1 COVID-19 AFFECTING PREGNANCY IN SECOND TRIMESTER: ICD-10-CM

## 2021-04-09 DIAGNOSIS — O98.512 COVID-19 AFFECTING PREGNANCY IN SECOND TRIMESTER: ICD-10-CM

## 2021-04-09 DIAGNOSIS — O99.019 IRON DEFICIENCY ANEMIA OF MOTHER DURING PREGNANCY: ICD-10-CM

## 2021-04-09 DIAGNOSIS — O10.913 CHRONIC HYPERTENSION IN OBSTETRIC CONTEXT IN THIRD TRIMESTER: ICD-10-CM

## 2021-04-09 DIAGNOSIS — Z3A.29 29 WEEKS GESTATION OF PREGNANCY: ICD-10-CM

## 2021-04-09 DIAGNOSIS — O99.013 ANEMIA AFFECTING PREGNANCY IN THIRD TRIMESTER: ICD-10-CM

## 2021-04-09 DIAGNOSIS — D50.9 IRON DEFICIENCY ANEMIA OF MOTHER DURING PREGNANCY: ICD-10-CM

## 2021-04-09 PROCEDURE — PNV: Performed by: PHYSICIAN ASSISTANT

## 2021-04-09 NOTE — PROGRESS NOTES
19 y o    female at 32w2d EGA for PNV   BP : ***  TWG: ***  Plans for contraception: {Contraception:75675::"***"}

## 2021-04-09 NOTE — PROGRESS NOTES
Patient is a 20 YO  female presenting to the office at 29 4 weeks for routine OB care  She is feeling well today and has no complaints  She is scheduled to do her 3 hour glucose     BP: 120/88  TWG: -2lb  Fetal Movement: present ,good movement  Discussed third trimester teaching  Reviewed fetal kick counts, normal FM  Reviewed signs of PTL, PIH  Patient denies LOF, CTX, vaginal bleeding, swelling  Reviewed red folder, consents, birth plan  Delivery consent obtained   Breastfeeding: plans to breastfeed   Breast Pump: ordered  TDAP: received   FLU Vaccine: declined  COVID Vaccine: declined  28 Week Labs: reviewed, going for 3 hour glucose, starting iron infusions next week  Patient to start 2x/week NST/MELONIE at 32 weeks  RTO 2 weeks for routine OB F/U

## 2021-04-09 NOTE — PROGRESS NOTES
Pt has no complaints  Red folder due today   Expresses interest in breast pump, ordered online today, pt aware  Tdap given at last appt

## 2021-04-10 ENCOUNTER — APPOINTMENT (OUTPATIENT)
Dept: LAB | Facility: HOSPITAL | Age: 21
End: 2021-04-10
Payer: OTHER GOVERNMENT

## 2021-04-10 DIAGNOSIS — Z3A.29 29 WEEKS GESTATION OF PREGNANCY: ICD-10-CM

## 2021-04-10 DIAGNOSIS — R73.09 ELEVATED GLUCOSE: ICD-10-CM

## 2021-04-10 DIAGNOSIS — O99.012 ANEMIA AFFECTING PREGNANCY IN SECOND TRIMESTER: ICD-10-CM

## 2021-04-10 DIAGNOSIS — Z3A.27 27 WEEKS GESTATION OF PREGNANCY: Primary | ICD-10-CM

## 2021-04-10 LAB — GLUCOSE P FAST SERPL-MCNC: 85 MG/DL (ref 65–99)

## 2021-04-10 PROCEDURE — 36415 COLL VENOUS BLD VENIPUNCTURE: CPT

## 2021-04-15 ENCOUNTER — HOSPITAL ENCOUNTER (OUTPATIENT)
Dept: INFUSION CENTER | Facility: CLINIC | Age: 21
Discharge: HOME | End: 2021-04-15
Payer: OTHER GOVERNMENT

## 2021-04-15 VITALS
SYSTOLIC BLOOD PRESSURE: 138 MMHG | TEMPERATURE: 97.5 F | HEART RATE: 112 BPM | DIASTOLIC BLOOD PRESSURE: 88 MMHG | RESPIRATION RATE: 18 BRPM | OXYGEN SATURATION: 99 %

## 2021-04-15 DIAGNOSIS — D50.9 IRON DEFICIENCY ANEMIA OF MOTHER DURING PREGNANCY: Primary | ICD-10-CM

## 2021-04-15 DIAGNOSIS — O99.019 IRON DEFICIENCY ANEMIA OF MOTHER DURING PREGNANCY: Primary | ICD-10-CM

## 2021-04-15 PROCEDURE — 96365 THER/PROPH/DIAG IV INF INIT: CPT

## 2021-04-15 RX ORDER — SODIUM CHLORIDE 9 MG/ML
20 INJECTION, SOLUTION INTRAVENOUS ONCE
Status: COMPLETED | OUTPATIENT
Start: 2021-04-15 | End: 2021-04-15

## 2021-04-15 RX ORDER — SODIUM CHLORIDE 9 MG/ML
20 INJECTION, SOLUTION INTRAVENOUS ONCE
Status: CANCELLED | OUTPATIENT
Start: 2021-04-17

## 2021-04-15 RX ADMIN — IRON SUCROSE 200 MG: 20 INJECTION, SOLUTION INTRAVENOUS at 14:44

## 2021-04-15 RX ADMIN — SODIUM CHLORIDE 20 ML/HR: 0.9 INJECTION, SOLUTION INTRAVENOUS at 14:41

## 2021-04-17 ENCOUNTER — HOSPITAL ENCOUNTER (OUTPATIENT)
Dept: INFUSION CENTER | Facility: CLINIC | Age: 21
Discharge: HOME | End: 2021-04-17
Payer: OTHER GOVERNMENT

## 2021-04-17 VITALS
RESPIRATION RATE: 18 BRPM | TEMPERATURE: 96.4 F | SYSTOLIC BLOOD PRESSURE: 149 MMHG | HEART RATE: 92 BPM | DIASTOLIC BLOOD PRESSURE: 93 MMHG

## 2021-04-17 DIAGNOSIS — D50.9 IRON DEFICIENCY ANEMIA OF MOTHER DURING PREGNANCY: Primary | ICD-10-CM

## 2021-04-17 DIAGNOSIS — O99.019 IRON DEFICIENCY ANEMIA OF MOTHER DURING PREGNANCY: Primary | ICD-10-CM

## 2021-04-17 PROCEDURE — 96365 THER/PROPH/DIAG IV INF INIT: CPT

## 2021-04-17 RX ORDER — SODIUM CHLORIDE 9 MG/ML
20 INJECTION, SOLUTION INTRAVENOUS ONCE
Status: COMPLETED | OUTPATIENT
Start: 2021-04-17 | End: 2021-04-17

## 2021-04-17 RX ORDER — SODIUM CHLORIDE 9 MG/ML
20 INJECTION, SOLUTION INTRAVENOUS ONCE
Status: CANCELLED | OUTPATIENT
Start: 2021-04-20

## 2021-04-17 RX ADMIN — SODIUM CHLORIDE 20 ML/HR: 0.9 INJECTION, SOLUTION INTRAVENOUS at 14:05

## 2021-04-17 RX ADMIN — IRON SUCROSE 200 MG: 20 INJECTION, SOLUTION INTRAVENOUS at 14:09

## 2021-04-17 NOTE — PROGRESS NOTES
Pt arrived to infusion without complaints for ordered Venofer      pts vss   call bell within reach    will continue to monitor

## 2021-04-18 DIAGNOSIS — I10 ESSENTIAL HYPERTENSION: ICD-10-CM

## 2021-04-19 RX ORDER — LABETALOL 200 MG/1
TABLET, FILM COATED ORAL
Qty: 180 TABLET | Refills: 1 | Status: SHIPPED | OUTPATIENT
Start: 2021-04-19 | End: 2022-01-03 | Stop reason: SDUPTHER

## 2021-04-20 ENCOUNTER — HOSPITAL ENCOUNTER (OUTPATIENT)
Dept: INFUSION CENTER | Facility: CLINIC | Age: 21
Discharge: HOME | End: 2021-04-20
Payer: OTHER GOVERNMENT

## 2021-04-20 VITALS
DIASTOLIC BLOOD PRESSURE: 86 MMHG | RESPIRATION RATE: 183 BRPM | OXYGEN SATURATION: 97 % | TEMPERATURE: 97.8 F | HEART RATE: 93 BPM | SYSTOLIC BLOOD PRESSURE: 160 MMHG

## 2021-04-20 DIAGNOSIS — D50.9 IRON DEFICIENCY ANEMIA OF MOTHER DURING PREGNANCY: Primary | ICD-10-CM

## 2021-04-20 DIAGNOSIS — O99.019 IRON DEFICIENCY ANEMIA OF MOTHER DURING PREGNANCY: Primary | ICD-10-CM

## 2021-04-20 PROCEDURE — 96365 THER/PROPH/DIAG IV INF INIT: CPT

## 2021-04-20 RX ORDER — SODIUM CHLORIDE 9 MG/ML
20 INJECTION, SOLUTION INTRAVENOUS ONCE
Status: COMPLETED | OUTPATIENT
Start: 2021-04-20 | End: 2021-04-20

## 2021-04-20 RX ORDER — SODIUM CHLORIDE 9 MG/ML
20 INJECTION, SOLUTION INTRAVENOUS ONCE
Status: CANCELLED | OUTPATIENT
Start: 2021-04-24

## 2021-04-20 RX ADMIN — SODIUM CHLORIDE 20 ML/HR: 0.9 INJECTION, SOLUTION INTRAVENOUS at 14:30

## 2021-04-20 RX ADMIN — IRON SUCROSE 200 MG: 20 INJECTION, SOLUTION INTRAVENOUS at 14:34

## 2021-04-20 NOTE — PROGRESS NOTES
Pt arrived to infusion without complaints for ordered Venofer  vss   call bell within reach    will continue to monitor

## 2021-04-24 ENCOUNTER — HOSPITAL ENCOUNTER (OUTPATIENT)
Dept: INFUSION CENTER | Facility: CLINIC | Age: 21
Discharge: HOME | End: 2021-04-24
Payer: OTHER GOVERNMENT

## 2021-04-24 VITALS
HEART RATE: 88 BPM | OXYGEN SATURATION: 99 % | TEMPERATURE: 98.2 F | RESPIRATION RATE: 18 BRPM | SYSTOLIC BLOOD PRESSURE: 134 MMHG | DIASTOLIC BLOOD PRESSURE: 84 MMHG

## 2021-04-24 DIAGNOSIS — O99.019 IRON DEFICIENCY ANEMIA OF MOTHER DURING PREGNANCY: Primary | ICD-10-CM

## 2021-04-24 DIAGNOSIS — D50.9 IRON DEFICIENCY ANEMIA OF MOTHER DURING PREGNANCY: Primary | ICD-10-CM

## 2021-04-24 PROCEDURE — 96365 THER/PROPH/DIAG IV INF INIT: CPT

## 2021-04-24 RX ORDER — SODIUM CHLORIDE 9 MG/ML
20 INJECTION, SOLUTION INTRAVENOUS ONCE
Status: COMPLETED | OUTPATIENT
Start: 2021-04-24 | End: 2021-04-24

## 2021-04-24 RX ORDER — SODIUM CHLORIDE 9 MG/ML
20 INJECTION, SOLUTION INTRAVENOUS ONCE
Status: CANCELLED | OUTPATIENT
Start: 2021-04-27

## 2021-04-24 RX ADMIN — SODIUM CHLORIDE 20 ML/HR: 0.9 INJECTION, SOLUTION INTRAVENOUS at 08:54

## 2021-04-24 RX ADMIN — IRON SUCROSE 200 MG: 20 INJECTION, SOLUTION INTRAVENOUS at 08:55

## 2021-04-24 NOTE — PROGRESS NOTES
Patient tolerated treatment without incident  Peripheral IV removed  Patient's next appointment confirmed  AVS offered and declined

## 2021-04-24 NOTE — PROGRESS NOTES
Patient presents today for venofer infusion  Patient offers no complaints  VSS  Peripheral IV inserted without incident

## 2021-04-27 ENCOUNTER — ROUTINE PRENATAL (OUTPATIENT)
Dept: OBGYN CLINIC | Facility: CLINIC | Age: 21
End: 2021-04-27

## 2021-04-27 ENCOUNTER — HOSPITAL ENCOUNTER (OUTPATIENT)
Dept: INFUSION CENTER | Facility: CLINIC | Age: 21
Discharge: HOME | End: 2021-04-27
Payer: OTHER GOVERNMENT

## 2021-04-27 VITALS
TEMPERATURE: 97.8 F | SYSTOLIC BLOOD PRESSURE: 138 MMHG | OXYGEN SATURATION: 93 % | DIASTOLIC BLOOD PRESSURE: 74 MMHG | HEART RATE: 97 BPM | RESPIRATION RATE: 18 BRPM

## 2021-04-27 VITALS — DIASTOLIC BLOOD PRESSURE: 84 MMHG | WEIGHT: 293 LBS | SYSTOLIC BLOOD PRESSURE: 138 MMHG | BODY MASS INDEX: 47.55 KG/M2

## 2021-04-27 DIAGNOSIS — D50.9 IRON DEFICIENCY ANEMIA OF MOTHER DURING PREGNANCY: ICD-10-CM

## 2021-04-27 DIAGNOSIS — U07.1 COVID-19 AFFECTING PREGNANCY IN SECOND TRIMESTER: ICD-10-CM

## 2021-04-27 DIAGNOSIS — Z3A.32 32 WEEKS GESTATION OF PREGNANCY: ICD-10-CM

## 2021-04-27 DIAGNOSIS — O99.019 IRON DEFICIENCY ANEMIA OF MOTHER DURING PREGNANCY: ICD-10-CM

## 2021-04-27 DIAGNOSIS — O98.512 COVID-19 AFFECTING PREGNANCY IN SECOND TRIMESTER: ICD-10-CM

## 2021-04-27 DIAGNOSIS — O10.913 CHRONIC HYPERTENSION IN OBSTETRIC CONTEXT IN THIRD TRIMESTER: Primary | ICD-10-CM

## 2021-04-27 DIAGNOSIS — O99.210 MATERNAL MORBID OBESITY, ANTEPARTUM (HCC): ICD-10-CM

## 2021-04-27 DIAGNOSIS — O99.019 IRON DEFICIENCY ANEMIA OF MOTHER DURING PREGNANCY: Primary | ICD-10-CM

## 2021-04-27 DIAGNOSIS — O99.013 ANEMIA AFFECTING PREGNANCY IN THIRD TRIMESTER: ICD-10-CM

## 2021-04-27 DIAGNOSIS — E66.01 MATERNAL MORBID OBESITY, ANTEPARTUM (HCC): ICD-10-CM

## 2021-04-27 DIAGNOSIS — D50.9 IRON DEFICIENCY ANEMIA OF MOTHER DURING PREGNANCY: Primary | ICD-10-CM

## 2021-04-27 PROCEDURE — 96365 THER/PROPH/DIAG IV INF INIT: CPT

## 2021-04-27 PROCEDURE — PNV: Performed by: PHYSICIAN ASSISTANT

## 2021-04-27 RX ORDER — SODIUM CHLORIDE 9 MG/ML
20 INJECTION, SOLUTION INTRAVENOUS ONCE
Status: COMPLETED | OUTPATIENT
Start: 2021-04-27 | End: 2021-04-27

## 2021-04-27 RX ORDER — SODIUM CHLORIDE 9 MG/ML
20 INJECTION, SOLUTION INTRAVENOUS ONCE
Status: CANCELLED | OUTPATIENT
Start: 2021-05-01

## 2021-04-27 RX ADMIN — IRON SUCROSE 200 MG: 20 INJECTION, SOLUTION INTRAVENOUS at 09:20

## 2021-04-27 RX ADMIN — SODIUM CHLORIDE 20 ML/HR: 0.9 INJECTION, SOLUTION INTRAVENOUS at 09:24

## 2021-04-27 NOTE — PROGRESS NOTES
Patient presents today for treatment with venofer  Patient offers no complaints  VSS  Peripheral IV inserted without incident

## 2021-04-27 NOTE — PLAN OF CARE
Problem: Knowledge Deficit  Goal: Patient/family/caregiver demonstrates understanding of disease process, treatment plan, medications, and discharge instructions  Description: Complete learning assessment and assess knowledge base    Interventions:  - Provide teaching at level of understanding  - Provide teaching via preferred learning methods  4/27/2021 0930 by Rachell Stanton RN  Outcome: Progressing  4/27/2021 0930 by Rachell Stanton RN  Outcome: Progressing  4/27/2021 0926 by Rachell Stanton RN  Outcome: Progressing

## 2021-04-27 NOTE — PROGRESS NOTES
Patient is a 18 YO  female presenting to the office at 32 1 weeks for routine OB care  BP: 138/84  TWG: -1lb  Fetal Movement: yes, good movement  NST today in office   Reactive and reassuring  Has NST/MELONIE on Friday with MFM  Reviewed PTL signs  Patient denies LOF, CTX, vaginal bleeding, HA, blurry vision  Continuing iron infusions  RTO 1 week NST

## 2021-04-30 ENCOUNTER — ULTRASOUND (OUTPATIENT)
Dept: PERINATAL CARE | Facility: OTHER | Age: 21
End: 2021-04-30
Payer: OTHER GOVERNMENT

## 2021-04-30 VITALS
WEIGHT: 293 LBS | SYSTOLIC BLOOD PRESSURE: 128 MMHG | BODY MASS INDEX: 41.95 KG/M2 | HEART RATE: 108 BPM | HEIGHT: 70 IN | DIASTOLIC BLOOD PRESSURE: 58 MMHG

## 2021-04-30 DIAGNOSIS — O98.512 COVID-19 AFFECTING PREGNANCY IN SECOND TRIMESTER: ICD-10-CM

## 2021-04-30 DIAGNOSIS — U07.1 COVID-19 AFFECTING PREGNANCY IN SECOND TRIMESTER: ICD-10-CM

## 2021-04-30 DIAGNOSIS — O99.210 MATERNAL MORBID OBESITY, ANTEPARTUM (HCC): ICD-10-CM

## 2021-04-30 DIAGNOSIS — R76.8 POSITIVE ANA (ANTINUCLEAR ANTIBODY): ICD-10-CM

## 2021-04-30 DIAGNOSIS — O10.913 CHRONIC HYPERTENSION IN OBSTETRIC CONTEXT IN THIRD TRIMESTER: ICD-10-CM

## 2021-04-30 DIAGNOSIS — E66.01 MATERNAL MORBID OBESITY, ANTEPARTUM (HCC): ICD-10-CM

## 2021-04-30 DIAGNOSIS — Z3A.32 32 WEEKS GESTATION OF PREGNANCY: Primary | ICD-10-CM

## 2021-04-30 PROCEDURE — 59025 FETAL NON-STRESS TEST: CPT | Performed by: OBSTETRICS & GYNECOLOGY

## 2021-04-30 PROCEDURE — 76815 OB US LIMITED FETUS(S): CPT | Performed by: OBSTETRICS & GYNECOLOGY

## 2021-04-30 NOTE — PATIENT INSTRUCTIONS
Nonstress Test for Pregnancy   WHAT YOU NEED TO KNOW:   What do I need to know about a nonstress test?  A nonstress test measures your baby's heart rate and movements  Nonstress means that no stress will be placed on your baby during the test    How do I prepare for a nonstress test?  Your healthcare provider will talk to you about how to prepare for this test  He may tell you to eat and drink plenty of fluids before your test  If you smoke, you may be asked not to smoke within 2 hours before the test  He will also tell you what medicines to take or not take on the day of your test    What will happen during a nonstress test?  You may be asked to lie down or recline back for the test on a bed  One or two belts with sensors will be placed around your abdomen  Your baby's heart rate will be recorded with a machine  If your baby does not move, your baby may be asleep  Your healthcare provider may make a noise near your abdomen to try to wake your baby  The test usually takes about 20 minutes, but can take longer if your baby needs to be awakened  What do I need to know about the test results? Your baby will be expected to move at least twice for a certain amount of time  Your baby's heart rate will be expected to go up by a certain number of beats per minute during movement  If your baby does not move as expected, the test may need to be repeated or you may need other tests  CARE AGREEMENT:   You have the right to help plan your care  Learn about your health condition and how it may be treated  Discuss treatment options with your healthcare providers to decide what care you want to receive  You always have the right to refuse treatment  The above information is an  only  It is not intended as medical advice for individual conditions or treatments  Talk to your doctor, nurse or pharmacist before following any medical regimen to see if it is safe and effective for you    © Copyright [address] [place] [address] Drive Information is for End User's use only and may not be sold, redistributed or otherwise used for commercial purposes  All illustrations and images included in CareNotes® are the copyrighted property of Rah WALLACE  or [address] [place] [address] Mario Alberto Goetz in Pregnancy   AMBULATORY CARE:   Kick counts  measure how much your baby is moving in your womb  A kick from your baby can be felt as a twist, turn, swish, roll, or jab  It is common to feel your baby kicking at 26 to 28 weeks of pregnancy  You may feel your baby kick as early as 20 weeks of pregnancy  You may want to start counting at 28 weeks  Contact your healthcare provider immediately if:   · You feel a change in the number of kicks or movements of your baby  · You feel fewer than 10 kicks within 2 hours  · You have questions or concerns about your baby's movements  Why measure kick counts:  Your baby's movement may provide information about your baby's health  He or she may move less, or not at all, if there are problems  Your baby may move less if he or she is not getting enough oxygen or nutrition from the placenta  Do not smoke while you are pregnant  Smoking decreases the amount of oxygen that gets to your baby  Talk to your healthcare provider if you need help to quit smoking  Tell your healthcare provider as soon as you feel a change in your baby's movements  When to measure kick counts:   · Measure kick counts at the same time every day  · Measure kick counts when your baby is awake and most active  Your baby may be most active in the evening  How to measure kick counts:  Check that your baby is awake before you measure kick counts  You can wake up your baby by lightly pushing on your belly, walking, or drinking something cold  Your healthcare provider may tell you different ways to measure kick counts  You may be told to do the following:  · Use a chart or clock to keep track of the time you start and finish counting       · Sit in a chair or lie on your left side  · Place your hands on the largest part of your belly  · Count until you reach 10 kicks  Write down how much time it takes to count 10 kicks  · It may take 30 minutes to 2 hours to count 10 kicks  It should not take more than 2 hours to count 10 kicks  Follow up with your healthcare provider as directed:  Write down your questions so you remember to ask them during your visits  © Copyright [address] Information is for End User's use only and may not be sold, redistributed or otherwise used for commercial purposes  All illustrations and images included in CareNotes® are the copyrighted property of A D A M , Inc  or [address] [place] [address]  The above information is an  only  It is not intended as medical advice for individual conditions or treatments  Talk to your doctor, nurse or pharmacist before following any medical regimen to see if it is safe and effective for you

## 2021-04-30 NOTE — PROGRESS NOTES
Fetal ultrasound examination for evaluation of MELONIE and NST at 32  4/7 weeks gestation  Hx of    Obesity  CHTN  She is asymptomatic  See Ob procedures in EPIC  1  MELONIE  wnl 17 1      2  NST  Reactive      Recommendations; 1  Twice a week NSTs, once a week MELONIE  2  Daily fetal movement counts  Thank you for referring your patient to our offices  If you have any further questions do not hesitate to contact us as [phone]      Beba Pantoja MD

## 2021-04-30 NOTE — PROGRESS NOTES
Pt  Reported active fetal movement at home between visit  Daily fetal kick count reviewed and emphasized  Patient verbalized understanding of all and was receptive      Sean Lau RN

## 2021-05-01 ENCOUNTER — HOSPITAL ENCOUNTER (OUTPATIENT)
Dept: INFUSION CENTER | Facility: CLINIC | Age: 21
Discharge: HOME | End: 2021-05-01
Payer: OTHER GOVERNMENT

## 2021-05-01 VITALS
TEMPERATURE: 97 F | HEART RATE: 112 BPM | OXYGEN SATURATION: 100 % | RESPIRATION RATE: 20 BRPM | DIASTOLIC BLOOD PRESSURE: 85 MMHG | SYSTOLIC BLOOD PRESSURE: 179 MMHG

## 2021-05-01 DIAGNOSIS — D50.9 IRON DEFICIENCY ANEMIA OF MOTHER DURING PREGNANCY: Primary | ICD-10-CM

## 2021-05-01 DIAGNOSIS — O99.019 IRON DEFICIENCY ANEMIA OF MOTHER DURING PREGNANCY: Primary | ICD-10-CM

## 2021-05-01 PROCEDURE — 96365 THER/PROPH/DIAG IV INF INIT: CPT

## 2021-05-01 RX ORDER — SODIUM CHLORIDE 9 MG/ML
20 INJECTION, SOLUTION INTRAVENOUS ONCE
Status: COMPLETED | OUTPATIENT
Start: 2021-05-01 | End: 2021-05-01

## 2021-05-01 RX ORDER — SODIUM CHLORIDE 9 MG/ML
20 INJECTION, SOLUTION INTRAVENOUS ONCE
Status: CANCELLED | OUTPATIENT
Start: 2021-05-06

## 2021-05-01 RX ADMIN — IRON SUCROSE 200 MG: 20 INJECTION, SOLUTION INTRAVENOUS at 09:25

## 2021-05-01 RX ADMIN — SODIUM CHLORIDE 20 ML/HR: 9 INJECTION, SOLUTION INTRAVENOUS at 09:25

## 2021-05-01 NOTE — PROGRESS NOTES
Pt resting with no complaints, vitals stable,  call bell within reach  All questions answered and emotional support given  Will continue to monitor

## 2021-05-01 NOTE — PLAN OF CARE
Problem: Potential for Falls  Goal: Patient will remain free of falls  Description: INTERVENTIONS:  - Assess patient frequently for physical needs  -  Identify cognitive and physical deficits and behaviors that affect risk of falls    -  [place] fall precautions as indicated by assessment   - Educate patient/family on patient safety including physical limitations  - Instruct patient to call for assistance with activity based on assessment  - Modify environment to reduce risk of injury  - Consider OT/PT consult to assist with strengthening/mobility  Outcome: Progressing

## 2021-05-04 ENCOUNTER — ROUTINE PRENATAL (OUTPATIENT)
Dept: OBGYN CLINIC | Facility: CLINIC | Age: 21
End: 2021-05-04

## 2021-05-04 VITALS — BODY MASS INDEX: 46.2 KG/M2 | SYSTOLIC BLOOD PRESSURE: 126 MMHG | WEIGHT: 293 LBS | DIASTOLIC BLOOD PRESSURE: 96 MMHG

## 2021-05-04 DIAGNOSIS — O10.913 CHRONIC HYPERTENSION IN OBSTETRIC CONTEXT IN THIRD TRIMESTER: Primary | ICD-10-CM

## 2021-05-04 DIAGNOSIS — O99.210 MATERNAL MORBID OBESITY, ANTEPARTUM (HCC): ICD-10-CM

## 2021-05-04 DIAGNOSIS — Z3A.33 33 WEEKS GESTATION OF PREGNANCY: ICD-10-CM

## 2021-05-04 DIAGNOSIS — E66.01 MATERNAL MORBID OBESITY, ANTEPARTUM (HCC): ICD-10-CM

## 2021-05-04 DIAGNOSIS — D50.9 IRON DEFICIENCY ANEMIA OF MOTHER DURING PREGNANCY: ICD-10-CM

## 2021-05-04 DIAGNOSIS — O99.019 IRON DEFICIENCY ANEMIA OF MOTHER DURING PREGNANCY: ICD-10-CM

## 2021-05-04 DIAGNOSIS — R73.09 ELEVATED GLUCOSE: ICD-10-CM

## 2021-05-04 PROCEDURE — PNV: Performed by: PHYSICIAN ASSISTANT

## 2021-05-04 NOTE — PROGRESS NOTES
Patient is a 20 YO  female presenting to the office at 33 1 weeks for routine OB care  BP: 126/96  TWG: -1lb  Fetal Movement: yes, good movement  Feeling well today  Blood sugars reviewed  Elevated fasting sugars  Rec referral to diabetes education  Order placed  Denies CTX, LOF, vaginal bleeding, HA, blurry vision  NST reactive and reassuring  MELONIE on Friday with MFM  Call for concerns  RTO 1 week

## 2021-05-06 ENCOUNTER — HOSPITAL ENCOUNTER (OUTPATIENT)
Dept: INFUSION CENTER | Facility: CLINIC | Age: 21
Discharge: HOME | End: 2021-05-06
Payer: OTHER GOVERNMENT

## 2021-05-06 VITALS
TEMPERATURE: 97.9 F | HEART RATE: 84 BPM | RESPIRATION RATE: 20 BRPM | DIASTOLIC BLOOD PRESSURE: 62 MMHG | SYSTOLIC BLOOD PRESSURE: 122 MMHG

## 2021-05-06 DIAGNOSIS — D50.9 IRON DEFICIENCY ANEMIA OF MOTHER DURING PREGNANCY: Primary | ICD-10-CM

## 2021-05-06 DIAGNOSIS — O99.019 IRON DEFICIENCY ANEMIA OF MOTHER DURING PREGNANCY: Primary | ICD-10-CM

## 2021-05-06 PROCEDURE — 96365 THER/PROPH/DIAG IV INF INIT: CPT

## 2021-05-06 RX ORDER — SODIUM CHLORIDE 9 MG/ML
20 INJECTION, SOLUTION INTRAVENOUS ONCE
Status: CANCELLED | OUTPATIENT
Start: 2021-05-08

## 2021-05-06 RX ORDER — SODIUM CHLORIDE 9 MG/ML
20 INJECTION, SOLUTION INTRAVENOUS ONCE
Status: COMPLETED | OUTPATIENT
Start: 2021-05-06 | End: 2021-05-06

## 2021-05-06 RX ADMIN — SODIUM CHLORIDE 20 ML/HR: 0.9 INJECTION, SOLUTION INTRAVENOUS at 13:21

## 2021-05-06 RX ADMIN — IRON SUCROSE 200 MG: 20 INJECTION, SOLUTION INTRAVENOUS at 13:21

## 2021-05-07 ENCOUNTER — ULTRASOUND (OUTPATIENT)
Dept: PERINATAL CARE | Facility: OTHER | Age: 21
End: 2021-05-07
Payer: OTHER GOVERNMENT

## 2021-05-07 VITALS
HEIGHT: 70 IN | HEART RATE: 109 BPM | SYSTOLIC BLOOD PRESSURE: 123 MMHG | DIASTOLIC BLOOD PRESSURE: 78 MMHG | WEIGHT: 293 LBS | BODY MASS INDEX: 41.95 KG/M2

## 2021-05-07 DIAGNOSIS — Z3A.33 33 WEEKS GESTATION OF PREGNANCY: ICD-10-CM

## 2021-05-07 DIAGNOSIS — O10.913 CHRONIC HYPERTENSION IN OBSTETRIC CONTEXT IN THIRD TRIMESTER: Primary | ICD-10-CM

## 2021-05-07 PROCEDURE — 76815 OB US LIMITED FETUS(S): CPT | Performed by: OBSTETRICS & GYNECOLOGY

## 2021-05-07 PROCEDURE — 99213 OFFICE O/P EST LOW 20 MIN: CPT | Performed by: OBSTETRICS & GYNECOLOGY

## 2021-05-07 PROCEDURE — 59025 FETAL NON-STRESS TEST: CPT | Performed by: OBSTETRICS & GYNECOLOGY

## 2021-05-07 NOTE — PATIENT INSTRUCTIONS
Kick Counts in Pregnancy   AMBULATORY CARE:   Kick counts  measure how much your baby is moving in your womb  A kick from your baby can be felt as a twist, turn, swish, roll, or jab  It is common to feel your baby kicking at 26 to 28 weeks of pregnancy  You may feel your baby kick as early as 20 weeks of pregnancy  You may want to start counting at 28 weeks  Contact your healthcare provider immediately if:   · You feel a change in the number of kicks or movements of your baby  · You feel fewer than 10 kicks within 2 hours  · You have questions or concerns about your baby's movements  Why measure kick counts:  Your baby's movement may provide information about your baby's health  He or she may move less, or not at all, if there are problems  Your baby may move less if he or she is not getting enough oxygen or nutrition from the placenta  Do not smoke while you are pregnant  Smoking decreases the amount of oxygen that gets to your baby  Talk to your healthcare provider if you need help to quit smoking  Tell your healthcare provider as soon as you feel a change in your baby's movements  When to measure kick counts:   · Measure kick counts at the same time every day  · Measure kick counts when your baby is awake and most active  Your baby may be most active in the evening  How to measure kick counts:  Check that your baby is awake before you measure kick counts  You can wake up your baby by lightly pushing on your belly, walking, or drinking something cold  Your healthcare provider may tell you different ways to measure kick counts  You may be told to do the following:  · Use a chart or clock to keep track of the time you start and finish counting  · Sit in a chair or lie on your left side  · Place your hands on the largest part of your belly  · Count until you reach 10 kicks  Write down how much time it takes to count 10 kicks  · It may take 30 minutes to 2 hours to count 10 kicks  It should not take more than 2 hours to count 10 kicks  Follow up with your healthcare provider as directed:  Write down your questions so you remember to ask them during your visits  © Copyright [address] Information is for End User's use only and may not be sold, redistributed or otherwise used for commercial purposes  All illustrations and images included in CareNotes® are the copyrighted property of A D A M , Inc  or [facility] David Lozada   The above information is an  only  It is not intended as medical advice for individual conditions or treatments  Talk to your doctor, nurse or pharmacist before following any medical regimen to see if it is safe and effective for you

## 2021-05-08 ENCOUNTER — HOSPITAL ENCOUNTER (OUTPATIENT)
Dept: INFUSION CENTER | Facility: CLINIC | Age: 21
Discharge: HOME | End: 2021-05-08
Payer: OTHER GOVERNMENT

## 2021-05-08 VITALS
SYSTOLIC BLOOD PRESSURE: 128 MMHG | HEART RATE: 88 BPM | TEMPERATURE: 97.4 F | RESPIRATION RATE: 18 BRPM | OXYGEN SATURATION: 99 % | DIASTOLIC BLOOD PRESSURE: 78 MMHG

## 2021-05-08 DIAGNOSIS — O99.019 IRON DEFICIENCY ANEMIA OF MOTHER DURING PREGNANCY: Primary | ICD-10-CM

## 2021-05-08 DIAGNOSIS — D50.9 IRON DEFICIENCY ANEMIA OF MOTHER DURING PREGNANCY: Primary | ICD-10-CM

## 2021-05-08 PROCEDURE — 96365 THER/PROPH/DIAG IV INF INIT: CPT

## 2021-05-08 RX ORDER — SODIUM CHLORIDE 9 MG/ML
20 INJECTION, SOLUTION INTRAVENOUS ONCE
Status: COMPLETED | OUTPATIENT
Start: 2021-05-08 | End: 2021-05-08

## 2021-05-08 RX ORDER — SODIUM CHLORIDE 9 MG/ML
20 INJECTION, SOLUTION INTRAVENOUS ONCE
Status: CANCELLED | OUTPATIENT
Start: 2021-05-11

## 2021-05-08 RX ADMIN — IRON SUCROSE 200 MG: 20 INJECTION, SOLUTION INTRAVENOUS at 08:39

## 2021-05-08 RX ADMIN — SODIUM CHLORIDE 20 ML/HR: 0.9 INJECTION, SOLUTION INTRAVENOUS at 08:33

## 2021-05-08 NOTE — PROGRESS NOTES
Pt  tolerated treatment without incident, AVS declined  Pt aware of f/u with provider, no future appt's with infusion at this time

## 2021-05-11 ENCOUNTER — ROUTINE PRENATAL (OUTPATIENT)
Dept: OBGYN CLINIC | Facility: CLINIC | Age: 21
End: 2021-05-11
Payer: OTHER GOVERNMENT

## 2021-05-11 VITALS — WEIGHT: 293 LBS | DIASTOLIC BLOOD PRESSURE: 90 MMHG | BODY MASS INDEX: 46.2 KG/M2 | SYSTOLIC BLOOD PRESSURE: 128 MMHG

## 2021-05-11 DIAGNOSIS — O99.012 ANEMIA AFFECTING PREGNANCY IN SECOND TRIMESTER: ICD-10-CM

## 2021-05-11 DIAGNOSIS — O98.512 COVID-19 AFFECTING PREGNANCY IN SECOND TRIMESTER: ICD-10-CM

## 2021-05-11 DIAGNOSIS — Z3A.32 32 WEEKS GESTATION OF PREGNANCY: ICD-10-CM

## 2021-05-11 DIAGNOSIS — D50.9 IRON DEFICIENCY ANEMIA OF MOTHER DURING PREGNANCY: ICD-10-CM

## 2021-05-11 DIAGNOSIS — U07.1 COVID-19 AFFECTING PREGNANCY IN SECOND TRIMESTER: ICD-10-CM

## 2021-05-11 DIAGNOSIS — O10.913 CHRONIC HYPERTENSION IN OBSTETRIC CONTEXT IN THIRD TRIMESTER: Primary | ICD-10-CM

## 2021-05-11 DIAGNOSIS — O99.019 IRON DEFICIENCY ANEMIA OF MOTHER DURING PREGNANCY: ICD-10-CM

## 2021-05-11 PROCEDURE — 59025 FETAL NON-STRESS TEST: CPT | Performed by: OBSTETRICS & GYNECOLOGY

## 2021-05-11 PROCEDURE — PNV: Performed by: OBSTETRICS & GYNECOLOGY

## 2021-05-11 NOTE — PROGRESS NOTES
19 y o    female at 28 1 wga [facility] for PNV  BP : 128/90   TWG: -1  CHTN - controlled with labetalol 200mg q12  Gest DM - DM ed next week, fasting glucose mildly elevated, 2h pp wnl  Anemia - has completed iron infusions  Delivery 37-39 wks - plan for closer to 37 weeks - suspected macrosomia  Reactive NST

## 2021-05-13 ENCOUNTER — DOCUMENTATION (OUTPATIENT)
Dept: PERINATAL CARE | Facility: CLINIC | Age: 21
End: 2021-05-13

## 2021-05-13 ENCOUNTER — PATIENT MESSAGE (OUTPATIENT)
Dept: PERINATAL CARE | Facility: CLINIC | Age: 21
End: 2021-05-13

## 2021-05-13 NOTE — PROGRESS NOTES
Date:  21  RE: Tripp Romano    : 2000  Estimated Date of Delivery: 21  EGA: 34w3d  OB/GYN: Nargis Maloney  Rule-out gestational diabetes      Current regimen:  Regular diet  Reported she ate in the middle of the night when her FBS was increased  Tested BG  For 1 week due to error in 3 hour GGT test when was given the wrong solution  Plan:  7% BG increased  No indication of gestational diabetes  Stop monitoring BG  Dr Dontrell Arnold reviewed her results & stated they were normal  OB physician to be notified     No need to schedule Diabetes Educaton    Date due to report next:  None    Oren Rutledge, MS,RD,CDE  Diabetes Educator  Diabetes [facility] pregnancy Program

## 2021-05-14 ENCOUNTER — ULTRASOUND (OUTPATIENT)
Dept: PERINATAL CARE | Facility: OTHER | Age: 21
End: 2021-05-14
Payer: OTHER GOVERNMENT

## 2021-05-14 VITALS
HEART RATE: 109 BPM | WEIGHT: 293 LBS | BODY MASS INDEX: 41.95 KG/M2 | SYSTOLIC BLOOD PRESSURE: 122 MMHG | HEIGHT: 70 IN | DIASTOLIC BLOOD PRESSURE: 60 MMHG

## 2021-05-14 DIAGNOSIS — U07.1 COVID-19 AFFECTING PREGNANCY IN SECOND TRIMESTER: ICD-10-CM

## 2021-05-14 DIAGNOSIS — O10.913 CHRONIC HYPERTENSION IN OBSTETRIC CONTEXT IN THIRD TRIMESTER: ICD-10-CM

## 2021-05-14 DIAGNOSIS — O98.512 COVID-19 AFFECTING PREGNANCY IN SECOND TRIMESTER: ICD-10-CM

## 2021-05-14 DIAGNOSIS — D50.9 IRON DEFICIENCY ANEMIA OF MOTHER DURING PREGNANCY: ICD-10-CM

## 2021-05-14 DIAGNOSIS — O99.019 IRON DEFICIENCY ANEMIA OF MOTHER DURING PREGNANCY: ICD-10-CM

## 2021-05-14 DIAGNOSIS — Z3A.34 34 WEEKS GESTATION OF PREGNANCY: ICD-10-CM

## 2021-05-14 DIAGNOSIS — O99.012 ANEMIA AFFECTING PREGNANCY IN SECOND TRIMESTER: ICD-10-CM

## 2021-05-14 PROCEDURE — 59025 FETAL NON-STRESS TEST: CPT | Performed by: OBSTETRICS & GYNECOLOGY

## 2021-05-14 PROCEDURE — 76815 OB US LIMITED FETUS(S): CPT | Performed by: OBSTETRICS & GYNECOLOGY

## 2021-05-14 NOTE — PROGRESS NOTES
Pt  Reported active fetal movement at home between visit  Daily fetal kick count reviewed and emphasized  Patient verbalized understanding of all and was receptive      Edna Rodriguez RN

## 2021-05-14 NOTE — PROGRESS NOTES
Ob ultrasound and NST    Fetal ultrasound examination for evaluation of MELONIE and NST at  34 and 4 7 weeks  gestation  Hx of   obesity and chronic hypertension    She is asymptomatic  See Ob procedures in EPIC  1  MELONIE   normal at 15 5  2  NST   Reactive      Recommendations; 1  Twice a week NSTs, once a week MELONIE  2  Daily fetal movement counts  Thank you for referring your patient to our offices  If you have any further questions do not hesitate to contact us as [phone]      Isaías Dillon MD

## 2021-05-18 ENCOUNTER — ROUTINE PRENATAL (OUTPATIENT)
Dept: OBGYN CLINIC | Facility: CLINIC | Age: 21
End: 2021-05-18
Payer: OTHER GOVERNMENT

## 2021-05-18 VITALS — WEIGHT: 293 LBS | SYSTOLIC BLOOD PRESSURE: 130 MMHG | BODY MASS INDEX: 46.35 KG/M2 | DIASTOLIC BLOOD PRESSURE: 78 MMHG

## 2021-05-18 DIAGNOSIS — E66.01 MATERNAL MORBID OBESITY, ANTEPARTUM (HCC): ICD-10-CM

## 2021-05-18 DIAGNOSIS — Z3A.35 35 WEEKS GESTATION OF PREGNANCY: Primary | ICD-10-CM

## 2021-05-18 DIAGNOSIS — O24.410 DIET CONTROLLED GESTATIONAL DIABETES MELLITUS (GDM) IN THIRD TRIMESTER: ICD-10-CM

## 2021-05-18 DIAGNOSIS — O99.019 ANEMIA DURING PREGNANCY: ICD-10-CM

## 2021-05-18 DIAGNOSIS — O10.913 CHRONIC HYPERTENSION IN OBSTETRIC CONTEXT IN THIRD TRIMESTER: ICD-10-CM

## 2021-05-18 DIAGNOSIS — O99.210 MATERNAL MORBID OBESITY, ANTEPARTUM (HCC): ICD-10-CM

## 2021-05-18 PROCEDURE — 59025 FETAL NON-STRESS TEST: CPT | Performed by: OBSTETRICS & GYNECOLOGY

## 2021-05-18 PROCEDURE — 87150 DNA/RNA AMPLIFIED PROBE: CPT | Performed by: OBSTETRICS & GYNECOLOGY

## 2021-05-18 PROCEDURE — PNV: Performed by: OBSTETRICS & GYNECOLOGY

## 2021-05-18 NOTE — PROGRESS NOTES
Pt due for NST today  Undressed for GBS today due to early delivery  Pt has her red folder paperwork today  Pt also wants to discuss scheduling her induction

## 2021-05-19 ENCOUNTER — LAB (OUTPATIENT)
Dept: LAB | Facility: HOSPITAL | Age: 21
End: 2021-05-19
Payer: OTHER GOVERNMENT

## 2021-05-19 ENCOUNTER — TELEPHONE (OUTPATIENT)
Dept: INFUSION CENTER | Facility: CLINIC | Age: 21
End: 2021-05-19

## 2021-05-19 DIAGNOSIS — O99.019 ANEMIA DURING PREGNANCY: ICD-10-CM

## 2021-05-19 DIAGNOSIS — O99.019 IRON DEFICIENCY ANEMIA OF MOTHER DURING PREGNANCY: Primary | ICD-10-CM

## 2021-05-19 DIAGNOSIS — D50.9 IRON DEFICIENCY ANEMIA OF MOTHER DURING PREGNANCY: Primary | ICD-10-CM

## 2021-05-19 LAB
ERYTHROCYTE [DISTWIDTH] IN BLOOD BY AUTOMATED COUNT: 14.7 % (ref 11.6–15.1)
HCT VFR BLD AUTO: 31.1 % (ref 34.8–46.1)
HGB BLD-MCNC: 9.6 G/DL (ref 11.5–15.4)
MCH RBC QN AUTO: 25.9 PG (ref 26.8–34.3)
MCHC RBC AUTO-ENTMCNC: 30.9 G/DL (ref 31.4–37.4)
MCV RBC AUTO: 84 FL (ref 82–98)
PLATELET # BLD AUTO: 337 THOUSANDS/UL (ref 149–390)
PMV BLD AUTO: 9.3 FL (ref 8.9–12.7)
RBC # BLD AUTO: 3.71 MILLION/UL (ref 3.81–5.12)
WBC # BLD AUTO: 10.99 THOUSAND/UL (ref 4.31–10.16)

## 2021-05-19 PROCEDURE — 85027 COMPLETE CBC AUTOMATED: CPT

## 2021-05-19 PROCEDURE — 36415 COLL VENOUS BLD VENIPUNCTURE: CPT

## 2021-05-19 NOTE — RESULT ENCOUNTER NOTE
Max Alberto,     We see a slight increase in your hemoglobin, so I recommend we go ahead with the last 3 infusions  Someone from the office may reach out to you to discuss scheduling  Please contact the office at [phone] with any questions       Larry

## 2021-05-19 NOTE — TELEPHONE ENCOUNTER
Isiah Vee reached out to schedule more Venofer appnts  She stated that her physician would like her to at least get three more prior to her scheduled  on the   She also stated that her availability is limited as she is still working  Unfortunately she was only able to schedule one more appnt for  as our other openings did not fit with her work schedule

## 2021-05-21 ENCOUNTER — TELEPHONE (OUTPATIENT)
Dept: INFUSION CENTER | Facility: CLINIC | Age: 21
End: 2021-05-21

## 2021-05-21 ENCOUNTER — ROUTINE PRENATAL (OUTPATIENT)
Dept: PERINATAL CARE | Facility: OTHER | Age: 21
End: 2021-05-21
Payer: OTHER GOVERNMENT

## 2021-05-21 VITALS
HEART RATE: 105 BPM | SYSTOLIC BLOOD PRESSURE: 112 MMHG | DIASTOLIC BLOOD PRESSURE: 50 MMHG | BODY MASS INDEX: 41.95 KG/M2 | WEIGHT: 293 LBS | HEIGHT: 70 IN

## 2021-05-21 DIAGNOSIS — O10.913 CHRONIC HYPERTENSION IN OBSTETRIC CONTEXT IN THIRD TRIMESTER: Primary | ICD-10-CM

## 2021-05-21 DIAGNOSIS — Z3A.35 35 WEEKS GESTATION OF PREGNANCY: ICD-10-CM

## 2021-05-21 LAB — GP B STREP DNA SPEC QL NAA+PROBE: NEGATIVE

## 2021-05-21 PROCEDURE — 59025 FETAL NON-STRESS TEST: CPT | Performed by: OBSTETRICS & GYNECOLOGY

## 2021-05-21 NOTE — TELEPHONE ENCOUNTER
Inbasket message sent x2 to request clarification of incomplete venofer orders for patient scheduled 5/25/21, no response  Tiger connect sent to follow up  Need new orders placed that include kvo and iv site access as well as hypersensitivity protocol and appointment request interval and venofer interval need to match    Gerald Hernandez PA-C will remove and add new orders on Monday 5/24/21

## 2021-05-21 NOTE — PROGRESS NOTES
Pt  Reported active fetal movement at home between visit  Daily fetal kick count reviewed and emphasized  Patient verbalized understanding of all and was receptive      Manpreet Langston RN

## 2021-05-24 DIAGNOSIS — O99.019 IRON DEFICIENCY ANEMIA OF MOTHER DURING PREGNANCY: Primary | ICD-10-CM

## 2021-05-24 DIAGNOSIS — D50.9 IRON DEFICIENCY ANEMIA OF MOTHER DURING PREGNANCY: Primary | ICD-10-CM

## 2021-05-24 RX ORDER — SODIUM CHLORIDE 9 MG/ML
20 INJECTION, SOLUTION INTRAVENOUS ONCE
Status: CANCELLED | OUTPATIENT
Start: 2021-05-25

## 2021-05-25 ENCOUNTER — ROUTINE PRENATAL (OUTPATIENT)
Dept: OBGYN CLINIC | Facility: CLINIC | Age: 21
End: 2021-05-25
Payer: OTHER GOVERNMENT

## 2021-05-25 ENCOUNTER — HOSPITAL ENCOUNTER (OUTPATIENT)
Dept: INFUSION CENTER | Facility: CLINIC | Age: 21
Discharge: HOME | End: 2021-05-25
Payer: OTHER GOVERNMENT

## 2021-05-25 VITALS — SYSTOLIC BLOOD PRESSURE: 126 MMHG | BODY MASS INDEX: 46.49 KG/M2 | DIASTOLIC BLOOD PRESSURE: 74 MMHG | WEIGHT: 293 LBS

## 2021-05-25 VITALS
RESPIRATION RATE: 20 BRPM | TEMPERATURE: 97.1 F | HEART RATE: 90 BPM | SYSTOLIC BLOOD PRESSURE: 118 MMHG | DIASTOLIC BLOOD PRESSURE: 80 MMHG | OXYGEN SATURATION: 97 %

## 2021-05-25 DIAGNOSIS — Z3A.36 36 WEEKS GESTATION OF PREGNANCY: Primary | ICD-10-CM

## 2021-05-25 DIAGNOSIS — O99.019 IRON DEFICIENCY ANEMIA OF MOTHER DURING PREGNANCY: Primary | ICD-10-CM

## 2021-05-25 DIAGNOSIS — D50.9 IRON DEFICIENCY ANEMIA OF MOTHER DURING PREGNANCY: Primary | ICD-10-CM

## 2021-05-25 DIAGNOSIS — O10.913 CHRONIC HYPERTENSION IN OBSTETRIC CONTEXT IN THIRD TRIMESTER: ICD-10-CM

## 2021-05-25 DIAGNOSIS — O99.013 ANEMIA AFFECTING PREGNANCY IN THIRD TRIMESTER: ICD-10-CM

## 2021-05-25 PROCEDURE — 96365 THER/PROPH/DIAG IV INF INIT: CPT

## 2021-05-25 PROCEDURE — 59025 FETAL NON-STRESS TEST: CPT | Performed by: OBSTETRICS & GYNECOLOGY

## 2021-05-25 PROCEDURE — PNV: Performed by: OBSTETRICS & GYNECOLOGY

## 2021-05-25 RX ORDER — SODIUM CHLORIDE 9 MG/ML
20 INJECTION, SOLUTION INTRAVENOUS ONCE
Status: COMPLETED | OUTPATIENT
Start: 2021-05-25 | End: 2021-05-25

## 2021-05-25 RX ORDER — SODIUM CHLORIDE 9 MG/ML
20 INJECTION, SOLUTION INTRAVENOUS ONCE
Status: CANCELLED | OUTPATIENT
Start: 2021-05-28

## 2021-05-25 RX ADMIN — IRON SUCROSE 200 MG: 20 INJECTION, SOLUTION INTRAVENOUS at 09:53

## 2021-05-25 RX ADMIN — SODIUM CHLORIDE 20 ML/HR: 0.9 INJECTION, SOLUTION INTRAVENOUS at 09:53

## 2021-05-25 NOTE — NURSING NOTE
Patient completed treatment today without issue  PIV removed intact for discharge  Patient aware of next appointment, declines AVS  Patient planned  on Monday,   Patient AAox4 and ambulatory upon DC

## 2021-05-25 NOTE — PROGRESS NOTES
19 y o    female at [facility] for PNV  BP : 126/74  TWlbs  Irregular mild contractions  No lof or vaginal bleeding  Good FM   Reassuring NST, for IOL next Monday 8pm

## 2021-05-25 NOTE — PROGRESS NOTES
Pt due for NST/MELONIE today  Pt is having pressure and contractions but nothing consistent  Pt would like to be checked today  Neg urine dip

## 2021-05-25 NOTE — NURSING NOTE
Patient arrives to infusion center for Venofer infusion  Patient reports mild fatigue, denies any other acute symptoms at this time  Patient reports she has tolerated previous Venofer infusions without issue  VSS, PIV obtained without difficulty  Infusion in progress, patient resting on recliner chair, call bell within reach

## 2021-05-28 ENCOUNTER — ULTRASOUND (OUTPATIENT)
Dept: PERINATAL CARE | Facility: OTHER | Age: 21
End: 2021-05-28
Payer: OTHER GOVERNMENT

## 2021-05-28 ENCOUNTER — ROUTINE PRENATAL (OUTPATIENT)
Dept: PERINATAL CARE | Facility: OTHER | Age: 21
End: 2021-05-28
Payer: OTHER GOVERNMENT

## 2021-05-28 VITALS
DIASTOLIC BLOOD PRESSURE: 61 MMHG | HEIGHT: 70 IN | SYSTOLIC BLOOD PRESSURE: 133 MMHG | BODY MASS INDEX: 41.95 KG/M2 | WEIGHT: 293 LBS | HEART RATE: 100 BPM

## 2021-05-28 VITALS
HEART RATE: 100 BPM | SYSTOLIC BLOOD PRESSURE: 133 MMHG | WEIGHT: 293 LBS | DIASTOLIC BLOOD PRESSURE: 61 MMHG | HEIGHT: 70 IN | BODY MASS INDEX: 41.95 KG/M2

## 2021-05-28 DIAGNOSIS — O99.210 MATERNAL MORBID OBESITY, ANTEPARTUM (HCC): ICD-10-CM

## 2021-05-28 DIAGNOSIS — E66.01 MATERNAL MORBID OBESITY, ANTEPARTUM (HCC): ICD-10-CM

## 2021-05-28 DIAGNOSIS — D50.9 IRON DEFICIENCY ANEMIA OF MOTHER DURING PREGNANCY: ICD-10-CM

## 2021-05-28 DIAGNOSIS — U07.1 COVID-19 AFFECTING PREGNANCY IN SECOND TRIMESTER: ICD-10-CM

## 2021-05-28 DIAGNOSIS — O10.913 CHRONIC HYPERTENSION IN OBSTETRIC CONTEXT IN THIRD TRIMESTER: ICD-10-CM

## 2021-05-28 DIAGNOSIS — O98.512 COVID-19 AFFECTING PREGNANCY IN SECOND TRIMESTER: ICD-10-CM

## 2021-05-28 DIAGNOSIS — O99.013 ANEMIA AFFECTING PREGNANCY IN THIRD TRIMESTER: ICD-10-CM

## 2021-05-28 DIAGNOSIS — O10.913 CHRONIC HYPERTENSION IN OBSTETRIC CONTEXT IN THIRD TRIMESTER: Primary | ICD-10-CM

## 2021-05-28 DIAGNOSIS — Z3A.36 36 WEEKS GESTATION OF PREGNANCY: ICD-10-CM

## 2021-05-28 DIAGNOSIS — O99.019 IRON DEFICIENCY ANEMIA OF MOTHER DURING PREGNANCY: ICD-10-CM

## 2021-05-28 DIAGNOSIS — Z3A.36 36 WEEKS GESTATION OF PREGNANCY: Primary | ICD-10-CM

## 2021-05-28 PROCEDURE — 76816 OB US FOLLOW-UP PER FETUS: CPT | Performed by: OBSTETRICS & GYNECOLOGY

## 2021-05-28 PROCEDURE — 59025 FETAL NON-STRESS TEST: CPT | Performed by: OBSTETRICS & GYNECOLOGY

## 2021-05-28 PROCEDURE — NC001 PR NO CHARGE

## 2021-05-28 NOTE — LETTER
May 28, 1248     [facility]  [address] [facility]  [address]  [facility]    Patient: Perry March   YOB: 2000   Date of Visit: 5/28/2021       Dear Dr Rivera Luna:    Thank you for referring Perry March to me for evaluation  Below are my notes for this consultation  If you have questions, please do not hesitate to call me  I look forward to following your patient along with you  Sincerely,        Jo Ann Bedolla MD        CC: No Recipients  Jo Ann Bedolla MD  5/28/2021  7:29 AM  Sign when Signing Visit    Please refer to the [facility] ultrasound report in Ob Procedures for additional information regarding today's visit

## 2021-05-28 NOTE — PROGRESS NOTES
Pt  Reported active fetal movement at home between visit  Daily fetal kick count reviewed and emphasized  Patient verbalized understanding of all and was receptive      Tyler Aguirre RN

## 2021-05-28 NOTE — PROGRESS NOTES
Please refer to the [facility] ultrasound report in Ob Procedures for additional information regarding today's visit

## 2021-05-31 ENCOUNTER — APPOINTMENT (OUTPATIENT)
Dept: LABOR AND DELIVERY | Facility: HOSPITAL | Age: 21
End: 2021-05-31
Payer: OTHER GOVERNMENT

## 2021-05-31 ENCOUNTER — ANESTHESIA EVENT (INPATIENT)
Dept: ANESTHESIOLOGY | Facility: HOSPITAL | Age: 21
End: 2021-05-31
Payer: OTHER GOVERNMENT

## 2021-05-31 ENCOUNTER — HOSPITAL ENCOUNTER (INPATIENT)
Facility: HOSPITAL | Age: 21
LOS: 3 days | Discharge: HOME | End: 2021-06-03
Attending: OBSTETRICS & GYNECOLOGY | Admitting: OBSTETRICS & GYNECOLOGY
Payer: OTHER GOVERNMENT

## 2021-05-31 ENCOUNTER — ANESTHESIA (INPATIENT)
Dept: ANESTHESIOLOGY | Facility: HOSPITAL | Age: 21
End: 2021-05-31
Payer: OTHER GOVERNMENT

## 2021-05-31 DIAGNOSIS — Z3A.37 37 WEEKS GESTATION OF PREGNANCY: ICD-10-CM

## 2021-05-31 LAB
ABO GROUP BLD: NORMAL
ALBUMIN SERPL BCP-MCNC: 2.3 G/DL (ref 3.5–5)
ALP SERPL-CCNC: 184 U/L (ref 46–116)
ALT SERPL W P-5'-P-CCNC: 28 U/L (ref 12–78)
ANION GAP SERPL CALCULATED.3IONS-SCNC: 11 MMOL/L (ref 4–13)
AST SERPL W P-5'-P-CCNC: 26 U/L (ref 5–45)
BILIRUB SERPL-MCNC: 0.22 MG/DL (ref 0.2–1)
BLD GP AB SCN SERPL QL: NEGATIVE
BUN SERPL-MCNC: 10 MG/DL (ref 5–25)
CALCIUM ALBUM COR SERPL-MCNC: 10.1 MG/DL (ref 8.3–10.1)
CALCIUM SERPL-MCNC: 8.7 MG/DL (ref 8.3–10.1)
CHLORIDE SERPL-SCNC: 103 MMOL/L (ref 100–108)
CO2 SERPL-SCNC: 24 MMOL/L (ref 21–32)
CREAT SERPL-MCNC: 1.04 MG/DL (ref 0.6–1.3)
CREAT UR-MCNC: 125 MG/DL
ERYTHROCYTE [DISTWIDTH] IN BLOOD BY AUTOMATED COUNT: 14.9 % (ref 11.6–15.1)
GFR SERPL CREATININE-BSD FRML MDRD: 76 ML/MIN/1.73SQ M
GLUCOSE SERPL-MCNC: 76 MG/DL (ref 65–140)
HCT VFR BLD AUTO: 31.4 % (ref 34.8–46.1)
HGB BLD-MCNC: 9.8 G/DL (ref 11.5–15.4)
MCH RBC QN AUTO: 25.8 PG (ref 26.8–34.3)
MCHC RBC AUTO-ENTMCNC: 31.2 G/DL (ref 31.4–37.4)
MCV RBC AUTO: 83 FL (ref 82–98)
PLATELET # BLD AUTO: 388 THOUSANDS/UL (ref 149–390)
PMV BLD AUTO: 9.4 FL (ref 8.9–12.7)
POTASSIUM SERPL-SCNC: 4 MMOL/L (ref 3.5–5.3)
PROT SERPL-MCNC: 7.2 G/DL (ref 6.4–8.2)
PROT UR-MCNC: 51 MG/DL
PROT/CREAT UR: 0.41 MG/G{CREAT} (ref 0–0.1)
RBC # BLD AUTO: 3.8 MILLION/UL (ref 3.81–5.12)
RH BLD: POSITIVE
SODIUM SERPL-SCNC: 138 MMOL/L (ref 136–145)
SPECIMEN EXPIRATION DATE: NORMAL
WBC # BLD AUTO: 11.31 THOUSAND/UL (ref 4.31–10.16)

## 2021-05-31 PROCEDURE — 0KQM0ZZ REPAIR PERINEUM MUSCLE, OPEN APPROACH: ICD-10-PCS | Performed by: OBSTETRICS & GYNECOLOGY

## 2021-05-31 PROCEDURE — 86900 BLOOD TYPING SEROLOGIC ABO: CPT | Performed by: OBSTETRICS & GYNECOLOGY

## 2021-05-31 PROCEDURE — 80053 COMPREHEN METABOLIC PANEL: CPT | Performed by: OBSTETRICS & GYNECOLOGY

## 2021-05-31 PROCEDURE — 82570 ASSAY OF URINE CREATININE: CPT | Performed by: OBSTETRICS & GYNECOLOGY

## 2021-05-31 PROCEDURE — 84156 ASSAY OF PROTEIN URINE: CPT | Performed by: OBSTETRICS & GYNECOLOGY

## 2021-05-31 PROCEDURE — 86850 RBC ANTIBODY SCREEN: CPT | Performed by: OBSTETRICS & GYNECOLOGY

## 2021-05-31 PROCEDURE — NC001 PR NO CHARGE: Performed by: OBSTETRICS & GYNECOLOGY

## 2021-05-31 PROCEDURE — 85027 COMPLETE CBC AUTOMATED: CPT | Performed by: OBSTETRICS & GYNECOLOGY

## 2021-05-31 PROCEDURE — 86592 SYPHILIS TEST NON-TREP QUAL: CPT | Performed by: OBSTETRICS & GYNECOLOGY

## 2021-05-31 PROCEDURE — 86901 BLOOD TYPING SEROLOGIC RH(D): CPT | Performed by: OBSTETRICS & GYNECOLOGY

## 2021-05-31 RX ORDER — ACETAMINOPHEN 325 MG/1
650 TABLET ORAL EVERY 4 HOURS PRN
Status: DISCONTINUED | OUTPATIENT
Start: 2021-05-31 | End: 2021-06-01

## 2021-05-31 RX ORDER — ONDANSETRON 2 MG/ML
4 INJECTION INTRAMUSCULAR; INTRAVENOUS EVERY 8 HOURS PRN
Status: DISCONTINUED | OUTPATIENT
Start: 2021-05-31 | End: 2021-06-01

## 2021-05-31 RX ORDER — SODIUM CHLORIDE, SODIUM LACTATE, POTASSIUM CHLORIDE, CALCIUM CHLORIDE 600; 310; 30; 20 MG/100ML; MG/100ML; MG/100ML; MG/100ML
125 INJECTION, SOLUTION INTRAVENOUS CONTINUOUS
Status: DISCONTINUED | OUTPATIENT
Start: 2021-05-31 | End: 2021-06-01

## 2021-05-31 RX ORDER — CALCIUM CARBONATE 200(500)MG
1000 TABLET,CHEWABLE ORAL 3 TIMES DAILY PRN
Status: DISCONTINUED | OUTPATIENT
Start: 2021-05-31 | End: 2021-06-01

## 2021-05-31 RX ORDER — ACETAMINOPHEN 500 MG
1000 TABLET ORAL EVERY 6 HOURS PRN
COMMUNITY
End: 2021-12-22 | Stop reason: ALTCHOICE

## 2021-05-31 RX ORDER — LABETALOL 200 MG/1
200 TABLET, FILM COATED ORAL 2 TIMES DAILY
Status: DISCONTINUED | OUTPATIENT
Start: 2021-05-31 | End: 2021-06-03 | Stop reason: HOSPADM

## 2021-05-31 RX ORDER — OXYTOCIN/RINGER'S LACTATE 30/500 ML
1-30 PLASTIC BAG, INJECTION (ML) INTRAVENOUS
Status: DISCONTINUED | OUTPATIENT
Start: 2021-05-31 | End: 2021-06-01

## 2021-05-31 RX ADMIN — Medication 2 MILLI-UNITS/MIN: at 22:41

## 2021-05-31 RX ADMIN — LABETALOL HYDROCHLORIDE 200 MG: 200 TABLET, FILM COATED ORAL at 22:46

## 2021-05-31 RX ADMIN — SODIUM CHLORIDE, SODIUM LACTATE, POTASSIUM CHLORIDE, AND CALCIUM CHLORIDE 125 ML/HR: .6; .31; .03; .02 INJECTION, SOLUTION INTRAVENOUS at 21:42

## 2021-05-31 RX ADMIN — CALCIUM CARBONATE (ANTACID) CHEW TAB 500 MG 1000 MG: 500 CHEW TAB at 23:33

## 2021-05-31 RX ADMIN — MISOPROSTOL 25 MCG: 100 TABLET ORAL at 22:02

## 2021-06-01 LAB
ALBUMIN SERPL BCP-MCNC: 2 G/DL (ref 3.5–5)
ALP SERPL-CCNC: 165 U/L (ref 46–116)
ALT SERPL W P-5'-P-CCNC: 28 U/L (ref 12–78)
ANION GAP SERPL CALCULATED.3IONS-SCNC: 7 MMOL/L (ref 4–13)
AST SERPL W P-5'-P-CCNC: 31 U/L (ref 5–45)
BASE EXCESS BLDCOA CALC-SCNC: -4.2 MMOL/L (ref 3–11)
BASE EXCESS BLDCOV CALC-SCNC: -2.1 MMOL/L (ref 1–9)
BILIRUB SERPL-MCNC: 0.19 MG/DL (ref 0.2–1)
BUN SERPL-MCNC: 9 MG/DL (ref 5–25)
CALCIUM ALBUM COR SERPL-MCNC: 10.1 MG/DL (ref 8.3–10.1)
CALCIUM SERPL-MCNC: 8.5 MG/DL (ref 8.3–10.1)
CHLORIDE SERPL-SCNC: 106 MMOL/L (ref 100–108)
CO2 SERPL-SCNC: 26 MMOL/L (ref 21–32)
CREAT SERPL-MCNC: 1.15 MG/DL (ref 0.6–1.3)
ERYTHROCYTE [DISTWIDTH] IN BLOOD BY AUTOMATED COUNT: 14.8 % (ref 11.6–15.1)
ERYTHROCYTE [DISTWIDTH] IN BLOOD BY AUTOMATED COUNT: 14.8 % (ref 11.6–15.1)
GFR SERPL CREATININE-BSD FRML MDRD: 67 ML/MIN/1.73SQ M
GLUCOSE SERPL-MCNC: 118 MG/DL (ref 65–140)
HCO3 BLDCOA-SCNC: 25 MMOL/L (ref 17.3–27.3)
HCO3 BLDCOV-SCNC: 21.7 MMOL/L (ref 12.2–28.6)
HCT VFR BLD AUTO: 29 % (ref 34.8–46.1)
HCT VFR BLD AUTO: 29.4 % (ref 34.8–46.1)
HGB BLD-MCNC: 9.1 G/DL (ref 11.5–15.4)
HGB BLD-MCNC: 9.2 G/DL (ref 11.5–15.4)
MAGNESIUM SERPL-MCNC: 3.7 MG/DL (ref 1.6–2.6)
MAGNESIUM SERPL-MCNC: 5.5 MG/DL (ref 1.6–2.6)
MCH RBC QN AUTO: 26.1 PG (ref 26.8–34.3)
MCH RBC QN AUTO: 26.1 PG (ref 26.8–34.3)
MCHC RBC AUTO-ENTMCNC: 31.3 G/DL (ref 31.4–37.4)
MCHC RBC AUTO-ENTMCNC: 31.4 G/DL (ref 31.4–37.4)
MCV RBC AUTO: 83 FL (ref 82–98)
MCV RBC AUTO: 84 FL (ref 82–98)
O2 CT VFR BLDCOA CALC: 12.1 ML/DL
OXYHGB MFR BLDCOA: 52 %
OXYHGB MFR BLDCOV: 80.6 %
PCO2 BLDCOA: 62.2 MM[HG] (ref 30–60)
PCO2 BLDCOV: 34.7 MM HG (ref 27–43)
PH BLDCOA: 7.22 [PH] (ref 7.23–7.43)
PH BLDCOV: 7.41 [PH] (ref 7.19–7.49)
PLATELET # BLD AUTO: 352 THOUSANDS/UL (ref 149–390)
PLATELET # BLD AUTO: 373 THOUSANDS/UL (ref 149–390)
PMV BLD AUTO: 9 FL (ref 8.9–12.7)
PMV BLD AUTO: 9.2 FL (ref 8.9–12.7)
PO2 BLDCOA: 23.9 MM HG (ref 5–25)
PO2 BLDCOV: 34.4 MM HG (ref 15–45)
POTASSIUM SERPL-SCNC: 4.2 MMOL/L (ref 3.5–5.3)
PROT SERPL-MCNC: 6.4 G/DL (ref 6.4–8.2)
RBC # BLD AUTO: 3.48 MILLION/UL (ref 3.81–5.12)
RBC # BLD AUTO: 3.52 MILLION/UL (ref 3.81–5.12)
RPR SER QL: NORMAL
SAO2 % BLDCOV: 18.8 ML/DL
SODIUM SERPL-SCNC: 139 MMOL/L (ref 136–145)
WBC # BLD AUTO: 12.3 THOUSAND/UL (ref 4.31–10.16)
WBC # BLD AUTO: 15.37 THOUSAND/UL (ref 4.31–10.16)

## 2021-06-01 PROCEDURE — 83735 ASSAY OF MAGNESIUM: CPT | Performed by: OBSTETRICS & GYNECOLOGY

## 2021-06-01 PROCEDURE — 80053 COMPREHEN METABOLIC PANEL: CPT | Performed by: OBSTETRICS & GYNECOLOGY

## 2021-06-01 PROCEDURE — 59400 OBSTETRICAL CARE: CPT | Performed by: OBSTETRICS & GYNECOLOGY

## 2021-06-01 PROCEDURE — 85027 COMPLETE CBC AUTOMATED: CPT | Performed by: OBSTETRICS & GYNECOLOGY

## 2021-06-01 PROCEDURE — 82805 BLOOD GASES W/O2 SATURATION: CPT | Performed by: OBSTETRICS & GYNECOLOGY

## 2021-06-01 RX ORDER — IBUPROFEN 600 MG/1
600 TABLET ORAL EVERY 6 HOURS PRN
Status: DISCONTINUED | OUTPATIENT
Start: 2021-06-01 | End: 2021-06-01 | Stop reason: SDUPTHER

## 2021-06-01 RX ORDER — CALCIUM CARBONATE 200(500)MG
1000 TABLET,CHEWABLE ORAL DAILY PRN
Status: DISCONTINUED | OUTPATIENT
Start: 2021-06-01 | End: 2021-06-03 | Stop reason: HOSPADM

## 2021-06-01 RX ORDER — DOCUSATE SODIUM 100 MG/1
100 CAPSULE, LIQUID FILLED ORAL 2 TIMES DAILY
Status: DISCONTINUED | OUTPATIENT
Start: 2021-06-01 | End: 2021-06-03 | Stop reason: HOSPADM

## 2021-06-01 RX ORDER — DIPHENHYDRAMINE HYDROCHLORIDE 50 MG/ML
25 INJECTION INTRAMUSCULAR; INTRAVENOUS EVERY 6 HOURS PRN
Status: DISCONTINUED | OUTPATIENT
Start: 2021-06-01 | End: 2021-06-03 | Stop reason: HOSPADM

## 2021-06-01 RX ORDER — ROPIVACAINE HYDROCHLORIDE 2 MG/ML
INJECTION, SOLUTION EPIDURAL; INFILTRATION; PERINEURAL AS NEEDED
Status: DISCONTINUED | OUTPATIENT
Start: 2021-06-01 | End: 2021-06-01 | Stop reason: HOSPADM

## 2021-06-01 RX ORDER — OXYTOCIN/RINGER'S LACTATE 30/500 ML
250 PLASTIC BAG, INJECTION (ML) INTRAVENOUS CONTINUOUS
Status: ACTIVE | OUTPATIENT
Start: 2021-06-01 | End: 2021-06-01

## 2021-06-01 RX ORDER — MAGNESIUM SULFATE HEPTAHYDRATE 40 MG/ML
4 INJECTION, SOLUTION INTRAVENOUS ONCE
Status: COMPLETED | OUTPATIENT
Start: 2021-06-01 | End: 2021-06-01

## 2021-06-01 RX ORDER — LIDOCAINE HYDROCHLORIDE 10 MG/ML
INJECTION, SOLUTION EPIDURAL; INFILTRATION; INTRACAUDAL; PERINEURAL AS NEEDED
Status: DISCONTINUED | OUTPATIENT
Start: 2021-06-01 | End: 2021-06-01 | Stop reason: HOSPADM

## 2021-06-01 RX ORDER — BUTORPHANOL TARTRATE 1 MG/ML
1 INJECTION, SOLUTION INTRAMUSCULAR; INTRAVENOUS
Status: DISCONTINUED | OUTPATIENT
Start: 2021-06-01 | End: 2021-06-01

## 2021-06-01 RX ORDER — IBUPROFEN 600 MG/1
600 TABLET ORAL EVERY 6 HOURS PRN
Status: DISCONTINUED | OUTPATIENT
Start: 2021-06-01 | End: 2021-06-03 | Stop reason: HOSPADM

## 2021-06-01 RX ORDER — ONDANSETRON 2 MG/ML
4 INJECTION INTRAMUSCULAR; INTRAVENOUS EVERY 8 HOURS PRN
Status: DISCONTINUED | OUTPATIENT
Start: 2021-06-01 | End: 2021-06-03 | Stop reason: HOSPADM

## 2021-06-01 RX ORDER — SENNOSIDES 8.6 MG
1 TABLET ORAL DAILY
Status: DISCONTINUED | OUTPATIENT
Start: 2021-06-01 | End: 2021-06-03 | Stop reason: HOSPADM

## 2021-06-01 RX ORDER — MAGNESIUM SULFATE HEPTAHYDRATE 40 MG/ML
2 INJECTION, SOLUTION INTRAVENOUS ONCE
Status: COMPLETED | OUTPATIENT
Start: 2021-06-01 | End: 2021-06-01

## 2021-06-01 RX ORDER — LORATADINE 10 MG/1
10 TABLET ORAL DAILY
Status: DISCONTINUED | OUTPATIENT
Start: 2021-06-01 | End: 2021-06-03 | Stop reason: HOSPADM

## 2021-06-01 RX ORDER — SODIUM CHLORIDE, SODIUM LACTATE, POTASSIUM CHLORIDE, CALCIUM CHLORIDE 600; 310; 30; 20 MG/100ML; MG/100ML; MG/100ML; MG/100ML
25 INJECTION, SOLUTION INTRAVENOUS CONTINUOUS
Status: DISCONTINUED | OUTPATIENT
Start: 2021-06-01 | End: 2021-06-02

## 2021-06-01 RX ORDER — DIAPER,BRIEF,INFANT-TODD,DISP
1 EACH MISCELLANEOUS 4 TIMES DAILY PRN
Status: DISCONTINUED | OUTPATIENT
Start: 2021-06-01 | End: 2021-06-03 | Stop reason: HOSPADM

## 2021-06-01 RX ORDER — CALCIUM GLUCONATE 94 MG/ML
1 INJECTION, SOLUTION INTRAVENOUS ONCE
Status: DISCONTINUED | OUTPATIENT
Start: 2021-06-01 | End: 2021-06-03 | Stop reason: HOSPADM

## 2021-06-01 RX ORDER — ACETAMINOPHEN 325 MG/1
650 TABLET ORAL EVERY 4 HOURS PRN
Status: DISCONTINUED | OUTPATIENT
Start: 2021-06-01 | End: 2021-06-03 | Stop reason: HOSPADM

## 2021-06-01 RX ORDER — MAGNESIUM SULFATE HEPTAHYDRATE 40 MG/ML
2 INJECTION, SOLUTION INTRAVENOUS CONTINUOUS
Status: DISCONTINUED | OUTPATIENT
Start: 2021-06-01 | End: 2021-06-02

## 2021-06-01 RX ORDER — LIDOCAINE HYDROCHLORIDE AND EPINEPHRINE 15; 5 MG/ML; UG/ML
INJECTION, SOLUTION EPIDURAL AS NEEDED
Status: DISCONTINUED | OUTPATIENT
Start: 2021-06-01 | End: 2021-06-01 | Stop reason: HOSPADM

## 2021-06-01 RX ORDER — LABETALOL 20 MG/4 ML (5 MG/ML) INTRAVENOUS SYRINGE
20 ONCE
Status: DISCONTINUED | OUTPATIENT
Start: 2021-06-01 | End: 2021-06-03 | Stop reason: HOSPADM

## 2021-06-01 RX ADMIN — LABETALOL HYDROCHLORIDE 200 MG: 200 TABLET, FILM COATED ORAL at 21:29

## 2021-06-01 RX ADMIN — MAGNESIUM SULFATE HEPTAHYDRATE 2 G/HR: 40 INJECTION, SOLUTION INTRAVENOUS at 15:25

## 2021-06-01 RX ADMIN — LIDOCAINE HYDROCHLORIDE 3 ML: 10 INJECTION, SOLUTION EPIDURAL; INFILTRATION; INTRACAUDAL; PERINEURAL at 06:03

## 2021-06-01 RX ADMIN — SODIUM CHLORIDE, SODIUM LACTATE, POTASSIUM CHLORIDE, AND CALCIUM CHLORIDE 125 ML/HR: .6; .31; .03; .02 INJECTION, SOLUTION INTRAVENOUS at 04:37

## 2021-06-01 RX ADMIN — BUTORPHANOL TARTRATE 1 MG: 1 INJECTION, SOLUTION INTRAMUSCULAR; INTRAVENOUS at 03:29

## 2021-06-01 RX ADMIN — ROPIVACAINE HYDROCHLORIDE: 2 INJECTION, SOLUTION EPIDURAL; INFILTRATION at 06:18

## 2021-06-01 RX ADMIN — LABETALOL HYDROCHLORIDE 200 MG: 200 TABLET, FILM COATED ORAL at 09:07

## 2021-06-01 RX ADMIN — MAGNESIUM SULFATE HEPTAHYDRATE 2 G: 40 INJECTION, SOLUTION INTRAVENOUS at 14:46

## 2021-06-01 RX ADMIN — IBUPROFEN 600 MG: 600 TABLET, FILM COATED ORAL at 16:32

## 2021-06-01 RX ADMIN — SODIUM CHLORIDE, SODIUM LACTATE, POTASSIUM CHLORIDE, AND CALCIUM CHLORIDE 125 ML/HR: .6; .31; .03; .02 INJECTION, SOLUTION INTRAVENOUS at 10:53

## 2021-06-01 RX ADMIN — SODIUM CHLORIDE, SODIUM LACTATE, POTASSIUM CHLORIDE, AND CALCIUM CHLORIDE 25 ML/HR: .6; .31; .03; .02 INJECTION, SOLUTION INTRAVENOUS at 15:31

## 2021-06-01 RX ADMIN — ROPIVACAINE HYDROCHLORIDE 6 ML: 2 INJECTION, SOLUTION EPIDURAL; INFILTRATION at 06:12

## 2021-06-01 RX ADMIN — WITCH HAZEL 1 PAD: 500 SOLUTION RECTAL; TOPICAL at 14:06

## 2021-06-01 RX ADMIN — FAMOTIDINE 20 MG: 10 INJECTION, SOLUTION INTRAVENOUS at 01:02

## 2021-06-01 RX ADMIN — FAMOTIDINE 20 MG: 10 INJECTION, SOLUTION INTRAVENOUS at 13:35

## 2021-06-01 RX ADMIN — HYDROCORTISONE 1 APPLICATION: 1 CREAM TOPICAL at 14:06

## 2021-06-01 RX ADMIN — BENZOCAINE AND LEVOMENTHOL: 200; 5 SPRAY TOPICAL at 14:06

## 2021-06-01 RX ADMIN — LIDOCAINE HYDROCHLORIDE AND EPINEPHRINE 3 ML: 15; 5 INJECTION, SOLUTION EPIDURAL at 06:06

## 2021-06-01 RX ADMIN — MAGNESIUM SULFATE HEPTAHYDRATE 4 G: 40 INJECTION, SOLUTION INTRAVENOUS at 15:03

## 2021-06-01 RX ADMIN — DOCUSATE SODIUM 100 MG: 100 CAPSULE, LIQUID FILLED ORAL at 18:20

## 2021-06-01 NOTE — DISCHARGE INSTRUCTIONS
Vaginal Delivery   WHAT YOU SHOULD KNOW:   A vaginal delivery is the birth of your baby through your vagina (birth canal)  AFTER YOU LEAVE:   Medicines:  · NSAIDs  help decrease swelling and pain or fever  This medicine is available with or without a doctor's order  NSAIDs can cause stomach bleeding or kidney problems in certain people  If you take blood thinner medicine, always ask your healthcare provider if NSAIDs are safe for you  Always read the medicine label and follow directions  · Take your medicine as directed  Call your healthcare provider if you think your medicine is not helping or if you have side effects  Tell him if you are allergic to any medicine  Keep a list of the medicines, vitamins, and herbs you take  Include the amounts, and when and why you take them  Bring the list or the pill bottles to follow-up visits  Carry your medicine list with you in case of an emergency  Follow up with your primary healthcare provider:  Most women need to return 6 weeks after a vaginal delivery  Ask about how to care for your wounds or stitches  Write down your questions so you remember to ask them during your visits  Activity:  Rest as much as possible  Try to keep all activities short  You may be able to do some exercise soon after you have your baby  Talk with your primary healthcare provider before you start exercising  If you work outside the home, ask when you can return to your job  Kegel exercises:  Kegel exercises may help your vaginal and rectal muscles heal faster  You can do Kegel exercises by tightening and relaxing the muscles around your vagina  Kegel exercises help make the muscles stronger  Breast care:  When your milk comes in, your breasts may feel full and hard  Ask how to care for your breasts, even if you are not breastfeeding  Constipation:  Do not try to push the bowel movement out if it is too hard   High-fiber foods, extra liquids, and regular exercise can help you prevent constipation  Examples of high-fiber foods are fruit and bran  Prune juice and water are good liquids to drink  Regular exercise helps your digestive system work  You may also be told to take over-the-counter fiber and stool softener medicines  Take these items as directed  Hemorrhoids:  Pregnancy can cause severe hemorrhoids  You may have rectal pain because of the hemorrhoids  Ask how to prevent or treat hemorrhoids  Perineum care: Your perineum is the area between your vagina and anus  Keep the area clean and dry to help it heal and to prevent infection  Wash the area gently with soap and water when you bathe or shower  Rinse your perineum with warm water when you use the toilet  Your primary healthcare provider may suggest you use a warm sitz bath to help decrease pain  A sitz bath is a bathtub or basin filled to hip level  Stay in the sitz bath for 20 to 30 minutes, or as directed  Vaginal discharge: You will have vaginal discharge, called lochia, after your delivery  The lochia is bright red the first day or two after the birth  By the fourth day, the amount decreases, and it turns red-brown  Use a sanitary pad rather than a tampon to prevent a vaginal infection  It is normal to have lochia up to 8 weeks after your baby is born  Monthly periods: Your period may start again within 7 to 12 weeks after your baby is born  If you are breastfeeding, it may take longer for your period to start again  You can still get pregnant again even though you do not have your monthly period  Talk with your primary healthcare provider about a birth control method that will be good for you if you do not want to get pregnant  Mood changes: Many new mothers have some kind of mood changes after delivery  Some of these changes occur because of lack of sleep, hormone changes, and caring for a new baby  Some mood changes can be more serious, such as postpartum depression   Talk with your primary healthcare provider if you feel unable to care for yourself or your baby  Sexual activity:  You may need to avoid sex for 6 to 7 weeks after you have your baby  You may notice you have a decreased desire for sex, or sex may be painful  You may need to use a vaginal lubricant (gel) to help make sex more comfortable  Contact your primary healthcare provider if:   · You have heavy vaginal bleeding that fills 1 or more sanitary pads in 1 hour  · You have a fever  · Your pain does not go away, or gets worse  · The skin between your vagina and rectum is swollen, warm, or red  · You have swollen, hard, or painful breasts  · You feel very sad or depressed  · You feel more tired than usual      · You have questions or concerns about your condition or care  Seek care immediately or call 911 if:   · You have pus or yellow drainage coming from your vagina or wound  · You are urinating very little, or not at all  · Your arm or leg feels warm, tender, and painful  It may look swollen and red  · You feel lightheaded, have sudden and worsening chest pain, or trouble breathing  You may have more pain when you take deep breaths or cough, or you may cough up blood  © 2014 [address] is for End User's use only and may not be sold, redistributed or otherwise used for commercial purposes  All illustrations and images included in CareNotes® are the copyrighted property of [employer]  or José Patino  The above information is an  only  It is not intended as medical advice for individual conditions or treatments  Talk to your doctor, nurse or pharmacist before following any medical regimen to see if it is safe and effective for you

## 2021-06-01 NOTE — H&P
[facility] 19 y o  female MRN: [id number]  Unit/Bed#: LD  Encounter: [id number]    Telly Martin is a patient of [address] [place] [address]    SUBJECTIVE:    Chief Complaint: Here for induction    HPI: Telly Martin is a 19 y o   with an BEATRIZ of 2021, by Last Menstrual Period at 37w0d who is being admitted for IOL due to [facility] for which she takes Labetalol 200mg BID  She denies having uterine contractions, has no LOF, and reports no VB  She states she has felt good FM  Patient Active Problem List   Diagnosis    Elevated TSH    Obesity    Vitiligo    Essential hypertension    Chronic hypertension in obstetric context in third trimester    Maternal morbid obesity, antepartum ([address] )    Positive KEELY (antinuclear antibody)    Abnormal results of thyroid function studies    COVID-19 affecting pregnancy in second trimester    36 weeks gestation of pregnancy    Anemia affecting pregnancy in third trimester    Iron deficiency anemia of mother during pregnancy       Baby complications/comments: EFW 95%ile     Review of Systems   Constitutional: Negative for chills and fever  HENT: Negative  Eyes: Negative for photophobia  Respiratory: Negative for shortness of breath  Cardiovascular: Negative for chest pain  Gastrointestinal: Negative for abdominal pain, nausea and vomiting  Endocrine: Negative  Genitourinary: Negative for dysuria, vaginal bleeding, vaginal discharge and vaginal pain  Musculoskeletal: Negative  Skin: Negative  Allergic/Immunologic: Negative  Neurological: Negative for headaches  Hematological: Negative  Psychiatric/Behavioral: Negative          OB History    Para Term  AB Living   1             SAB TAB Ectopic Multiple Live Births                  # Outcome Date GA Lbr Mac/2nd Weight Sex Delivery Anes PTL Lv   1 Current                Past Medical History:   Diagnosis Date    Anxiety     Congenital pes planus of both feet Resolved 1/312017     Difficulty walking     Resolved 1/31/2017     Eczema     Hypertension     Mass of hand, left     Resolved 1/31/2017     Obesity 8/11/2016    Pain     chronic back pain    Varicella     vaccine       Past Surgical History:   Procedure Laterality Date    FL INJECTION LEFT HIP (ARTHROGRAM)  11/30/2018    HAND SURGERY      WISDOM TOOTH EXTRACTION         Social History     Tobacco Use    Smoking status: Never Smoker    Smokeless tobacco: Never Used   Substance Use Topics    Alcohol use: Not Currently     Frequency: Monthly or less     Comment: socially prior to confirmed pregnancy       No Known Allergies    Medications Prior to Admission   Medication    acetaminophen (TYLENOL) 500 mg tablet    cyclobenzaprine (FLEXERIL) 5 mg tablet    Doxylamine Succinate, Sleep, (UNISOM PO)    labetalol (NORMODYNE) 200 mg tablet    loratadine (CLARITIN) 10 mg tablet    Prenatal MV & Min w/FA-DHA (Prenatal Adult Gummy/DHA/FA) 0 4-25 MG CHEW    docusate sodium (COLACE) 100 mg capsule    ferrous sulfate 324 (65 Fe) mg           OBJECTIVE:  Vitals:  Temp:  [98 1 °F (36 7 °C)-98 6 °F (37 °C)] 98 6 °F (37 °C)  HR:  [] 95  Resp:  [18] 18  BP: (146-167)/(86-95) 146/86  Body mass index is 46 4 kg/m²  Physical Exam:  Physical Exam  Constitutional:       Appearance: Normal appearance  Cardiovascular:      Rate and Rhythm: Normal rate and regular rhythm  Pulmonary:      Breath sounds: Normal breath sounds  Abdominal:      Tenderness: There is no abdominal tenderness  There is no guarding or rebound  Comments: Gravid    Musculoskeletal: Normal range of motion  Neurological:      General: No focal deficit present  Mental Status: She is alert  Mental status is at baseline  Psychiatric:         Mood and Affect: Mood normal           Physical Exam  Constitutional:       Appearance: Normal appearance  Cardiovascular:      Rate and Rhythm: Normal rate and regular rhythm  Pulmonary:      Breath sounds: Normal breath sounds  Abdominal:      Tenderness: There is no abdominal tenderness  There is no guarding or rebound  Comments: Gravid    Musculoskeletal: Normal range of motion  Neurological:      General: No focal deficit present  Mental Status: She is alert  Mental status is at baseline  Psychiatric:         Mood and Affect: Mood normal        SVE: Cervical Dilation: 1-2  Cervical Effacement: 60  Cervical Consistency: Soft  Fetal Station: -2  Presentation: Vertex  Method: Manual  OB Examiner: DeFruscio    FHT:  Baseline Rate: 125 bpm  Variability: Moderate 6-25 bpm  Accelerations: 15 x 15 or greater  Decelerations: None  FHR Category: Category I    TOCO:   Contraction Frequency (minutes): n/a  Contraction Quality: Not applicable    Lab Results   Component Value Date    WBC 10 99 (H) 2021    HGB 9 6 (L) 2021    HCT 31 1 (L) 2021     2021     Lab Results   Component Value Date    K 3 9 2021     2021    CO2 25 2021    BUN 7 2021    CREATININE 0 71 2021    GLUCOSE 129 2020    GLUCOSE 112 2020    AST 19 2021    ALT 24 2021       Prenatal Labs: Reviewed      Blood type: A+  Antibody: negative  Group B strep: negative  HIV: negative  Hepatitis B: negative  RPR: non-reactive  Rubella: Immune  1 hour Glucose: 156  3 hour glucose: 85, 57  Platelets: 349  Hgb: 9 6    Assessment: 19 y o   at 37w0d admitted for IOL    SVE: 1-2/60/-2  FHT: cat 1  Clinical EFW: 8 5-9 ; Vertex confirmed by US  GBS status: negative       Plan:   · Admit  · CBC, RPR, Type & Screen  · CMP, P/C   · 125cc/hr LR   · Labetalol 200 mg BID   · Analgesia at maternal request  · Ibrahim/Cytotec  · Antibiotics not indicated    Dr Saskia Coates aware    >2 Midnights  [address] Erik Llanes MD  OB/GYN PGY-1  2021  9:41 PM

## 2021-06-01 NOTE — ANESTHESIA PROCEDURE NOTES
Epidural Block    Patient location during procedure: OB  Start time: 6/1/2021 6:06 AM  Reason for block: at surgeon's request and primary anesthetic  Staffing  Anesthesiologist: Mariia Cardona MD  Resident/CRNA: Luly Kwong CRNA  Performed: resident/CRNA   Preanesthetic Checklist  Completed: patient identified, surgical consent, pre-op evaluation, timeout performed, IV checked, risks and benefits discussed and monitors and equipment checked  Epidural  Patient position: sitting  Prep: ChloraPrep  Patient monitoring: continuous pulse ox and frequent blood pressure checks  Approach: midline  Location: lumbar (1-5)  Injection technique: RENZO saline  Needle  Needle type: Tuohy   Epidural needle gauge: 17G  Catheter type: side hole  Catheter size: 19G    Catheter at skin depth: 13 cm  Test dose: negativenegative aspiration for CSF, negative aspiration for heme and no paresthesia on injection  patient tolerated the procedure well with no immediate complications  Additional Notes  Sterile prep and drape in accordance with protocol

## 2021-06-01 NOTE — OB LABOR/OXYTOCIN SAFETY PROGRESS
Oxytocin Safety Progress Check Note - Jina Mendiola 19 y o  female MRN: [id number]    Unit/Bed#: -01 Encounter: [id number]    Dose (yara-units/min) Oxytocin: 18 yara-units/min  Contraction Frequency (minutes): 2  Contraction Quality: Moderate  Tachysystole: No   Cervical Dilation: 5        Cervical Effacement: 80  Fetal Station: -1  Baseline Rate: 120 bpm  Fetal Heart Rate: 122 BPM  FHR Category: Category I  Oxytocin Safety Progress Check: Safety check completed            Vital Signs:   Vitals:    06/01/21 1012   BP: 157/94   Pulse: 81   Resp:    Temp:    SpO2:            Notes/comments:    cervical exam as above  Comfortable with epidural   Placed on the peanut ball   Ibrahim in place  Continue pitocin titration   Dr Jina Mendiola aware    Fausto Villagomez MD 6/1/2021 10:27 AM

## 2021-06-01 NOTE — ANESTHESIA POSTPROCEDURE EVALUATION
Post-Op Assessment Note    CV Status:  Stable  Pain Score: 0    Pain management: adequate     Mental Status:  Alert and awake   Hydration Status:  Euvolemic   PONV Controlled:  Controlled   Airway Patency:  Patent      Post Op Vitals Reviewed: Yes      Staff: CRNA, Anesthesiologist     Post-op block assessment: catheter intact and no complications      No complications documented      /70 (06/01/21 1330)    Temp      Pulse     Resp      SpO2

## 2021-06-01 NOTE — OB LABOR/OXYTOCIN SAFETY PROGRESS
Labor Progress Note - [facility] 19 y o  female MRN: [id number]    Unit/Bed#: -01 Encounter: [id number]       Contraction Frequency (minutes): n/a  Contraction Quality: Not applicable      Cervical Dilation: 1-2        Cervical Effacement: 60  Fetal Station: -2  Baseline Rate: 125 bpm  Fetal Heart Rate: 143 BPM  FHR Category: Category I               Vital Signs:   Vitals:    05/31/21 2213   BP: 138/81   Pulse: 90   Resp: 18   Temp: 98 9 °F (37 2 °C)   SpO2:            Notes/comments: Patient AROM'ed for clear fluid while placing the perez balloon  Perez was removed and SVE 2-3/60/-2  Plan to start pitocin for induction  Dr Chantal Parish aware          Anaya Metz MD 5/31/2021 10:14 PM

## 2021-06-01 NOTE — PLAN OF CARE
Problem: Potential for Falls  Goal: Patient will remain free of falls  Description: INTERVENTIONS:  - Assess patient frequently for physical needs  -  Identify cognitive and physical deficits and behaviors that affect risk of falls  -  [place] fall precautions as indicated by assessment   - Educate patient/family on patient safety including physical limitations  - Instruct patient to call for assistance with activity based on assessment  - Modify environment to reduce risk of injury  - Consider OT/PT consult to assist with strengthening/mobility  Outcome: Progressing     Problem: BIRTH - VAGINAL/ SECTION  Goal: Fetal and maternal status remain reassuring during the birth process  Description: INTERVENTIONS:  - Monitor vital signs  - Monitor fetal heart rate  - Monitor uterine activity  - Monitor labor progression (vaginal delivery)  - DVT prophylaxis  - Antibiotic prophylaxis  Outcome: Progressing  Goal: Emotionally satisfying birthing experience for mother/fetus  Description: Interventions:  - Assess, plan, implement and evaluate the nursing care given to the patient in labor  - Advocate the philosophy that each childbirth experience is a unique experience and support the family's chosen level of involvement and control during the labor process   - Actively participate in both the patient's and family's teaching of the birth process  - Consider cultural, Sabianist and age-specific factors and plan care for the patient in labor  Outcome: Progressing     Problem: Knowledge Deficit  Goal: Verbalizes understanding of labor plan  Description: Assess patient/family/caregiver's baseline knowledge level and ability to understand information  Provide education via patient/family/caregiver's preferred learning method at appropriate level of understanding  1  Provide teaching at level of understanding  2  Provide teaching via preferred learning method(s)    Outcome: Progressing  Goal: Patient/family/caregiver demonstrates understanding of disease process, treatment plan, medications, and discharge instructions  Description: Complete learning assessment and assess knowledge base  Interventions:  - Provide teaching at level of understanding  - Provide teaching via preferred learning methods  Outcome: Progressing     Problem: Labor & Delivery  Goal: Manages discomfort  Description: Assess and monitor for signs and symptoms of discomfort  Assess patient's pain level regularly and per hospital policy  Administer medications as ordered  Support use of nonpharmacological methods to help control pain such as distraction, imagery, relaxation, and application of heat and cold  Collaborate with interdisciplinary team and patient to determine appropriate pain management plan  1  Include patient in decisions related to comfort  2  Offer non-pharmacological pain management interventions  3  Report ineffective pain management to physician  Outcome: Progressing  Goal: Patient vital signs are stable  Description: 1  Assess vital signs - vaginal delivery    Outcome: Progressing     Problem: PAIN - ADULT  Goal: Verbalizes/displays adequate comfort level or baseline comfort level  Description: Interventions:  - Encourage patient to monitor pain and request assistance  - Assess pain using appropriate pain scale  - Administer analgesics based on type and severity of pain and evaluate response  - Implement non-pharmacological measures as appropriate and evaluate response  - Consider cultural and social influences on pain and pain management  - Notify physician/advanced practitioner if interventions unsuccessful or patient reports new pain  Outcome: Progressing     Problem: INFECTION - ADULT  Goal: Absence or prevention of progression during hospitalization  Description: INTERVENTIONS:  - Assess and monitor for signs and symptoms of infection  - Monitor lab/diagnostic results  - Monitor all insertion sites, i e  indwelling lines, tubes, and drains  - Monitor endotracheal if appropriate and nasal secretions for changes in amount and color  - [place] appropriate cooling/warming therapies per order  - Administer medications as ordered  - Instruct and encourage patient and family to use good hand hygiene technique  - Identify and instruct in appropriate isolation precautions for identified infection/condition  Outcome: Progressing  Goal: Absence of fever/infection during neutropenic period  Description: INTERVENTIONS:  - Monitor WBC    Outcome: Progressing     Problem: SAFETY ADULT  Goal: Maintain or return to baseline ADL function  Description: INTERVENTIONS:  -  Assess patient's ability to carry out ADLs; assess patient's baseline for ADL function and identify physical deficits which impact ability to perform ADLs (bathing, care of mouth/teeth, toileting, grooming, dressing, etc )  - Assess/evaluate cause of self-care deficits   - Assess range of motion  - Assess patient's mobility; develop plan if impaired  - Assess patient's need for assistive devices and provide as appropriate  - Encourage maximum independence but intervene and supervise when necessary  - Involve family in performance of ADLs  - Assess for home care needs following discharge   - Consider OT consult to assist with ADL evaluation and planning for discharge  - Provide patient education as appropriate  Outcome: Progressing  Goal: Maintain or return mobility status to optimal level  Description: INTERVENTIONS:  - Assess patient's baseline mobility status (ambulation, transfers, stairs, etc )    - Identify cognitive and physical deficits and behaviors that affect mobility  - Identify mobility aids required to assist with transfers and/or ambulation (gait belt, sit-to-stand, lift, walker, cane, etc )  - [place] fall precautions as indicated by assessment  - Record patient progress and toleration of activity level on Mobility SBAR; progress patient to next Phase/Stage  - Instruct patient to call for assistance with activity based on assessment  - Consider rehabilitation consult to assist with strengthening/weightbearing, etc   Outcome: Progressing     Problem: DISCHARGE PLANNING  Goal: Discharge to home or other facility with appropriate resources  Description: INTERVENTIONS:  - Identify barriers to discharge w/patient and caregiver  - Arrange for needed discharge resources and transportation as appropriate  - Identify discharge learning needs (meds, wound care, etc )  - Arrange for interpretive services to assist at discharge as needed  - Refer to Case Management Department for coordinating discharge planning if the patient needs post-hospital services based on physician/advanced practitioner order or complex needs related to functional status, cognitive ability, or social support system  Outcome: Progressing

## 2021-06-01 NOTE — OB LABOR/OXYTOCIN SAFETY PROGRESS
Oxytocin Safety Progress Check Note - Charna Heimlich 19 y o  female MRN: [id number]    Unit/Bed#: -01 Encounter: [id number]       Contraction Frequency (minutes): none  Contraction Quality: Not applicable      Cervical Dilation: 2-3        Cervical Effacement: 60  Fetal Station: -2  Baseline Rate: 125 bpm  Fetal Heart Rate: 143 BPM  FHR Category: Category I               Vital Signs:   Vitals:    05/31/21 2213   BP: 138/81   Pulse: 90   Resp: 18   Temp: 98 9 °F (37 2 °C)   SpO2:      Recent Results (from the past 12 hour(s))   CBC    Collection Time: 05/31/21  9:47 PM   Result Value Ref Range    WBC 11 31 (H) 4 31 - 10 16 Thousand/uL    RBC 3 80 (L) 3 81 - 5 12 Million/uL    Hemoglobin 9 8 (L) 11 5 - 15 4 g/dL    Hematocrit 31 4 (L) 34 8 - 46 1 %    MCV 83 82 - 98 fL    MCH 25 8 (L) 26 8 - 34 3 pg    MCHC 31 2 (L) 31 4 - 37 4 g/dL    RDW 14 9 11 6 - 15 1 %    Platelets 646 976 - 839 Thousands/uL    MPV 9 4 8 9 - 12 7 fL   Comprehensive metabolic panel    Collection Time: 05/31/21  9:47 PM   Result Value Ref Range    Sodium 138 136 - 145 mmol/L    Potassium 4 0 3 5 - 5 3 mmol/L    Chloride 103 100 - 108 mmol/L    CO2 24 21 - 32 mmol/L    ANION GAP 11 4 - 13 mmol/L    BUN 10 5 - 25 mg/dL    Creatinine 1 04 0 60 - 1 30 mg/dL    Glucose 76 65 - 140 mg/dL    Calcium 8 7 8 3 - 10 1 mg/dL    Corrected Calcium 10 1 8 3 - 10 1 mg/dL    AST 26 5 - 45 U/L    ALT 28 12 - 78 U/L    Alkaline Phosphatase 184 (H) 46 - 116 U/L    Total Protein 7 2 6 4 - 8 2 g/dL    Albumin 2 3 (L) 3 5 - 5 0 g/dL    Total Bilirubin 0 22 0 20 - 1 00 mg/dL    eGFR 76 ml/min/1 73sq m   Protein / creatinine ratio, urine    Collection Time: 05/31/21  9:48 PM   Result Value Ref Range    Creatinine, Ur 125 0 mg/dL    Protein Urine Random 51 mg/dL    Prot/Creat Ratio, Ur 0 41 (H) 0 00 - 0 10         Notes/comments:     Patient now meets criteria for cHTN w/ superimposed preeclampsia without severe features  PC 0 41, Cr 1 04  BP have been non-severe  Discussed with the patient diagnosis of preeclampsia  She is aware that we will be monitoring her blood pressures very closely throughout her labor course  If she were to have 2 consecutive severe range blood pressures (SBP >160 or DBP >110) she will require treatment for acute severe hypertension with IV anti-hypertensive medications  We discussed the diagnosis of severe preeclampsia and the signs of severe features including: persistent headache, visual disturbances, chest pain, shortness of breath, RUQ/epigastric pain  I told the patient to inform us during her labor course if she were to experience any of these symptoms  We also discussed the lab derangements that warrant the diagnosis of severe preeclampsia  If she were to meet criteria for severe preeclampsia, that would require treatment with magnesium sulfate for seizure prophylaxis  She and her partner verbalize understanding and all questions were answered  Dr Jose Enrique Lockhart MD 5/31/2021 10:34 PM

## 2021-06-01 NOTE — L&D DELIVERY NOTE
Vaginal Delivery Summary - OB/GYN   Cathie Enrique 19 y o  female MRN: [id number]  Unit/Bed#: LD  Encounter: [id number]    Pre-delivery Diagnosis:   Patient Active Problem List   Diagnosis    Elevated TSH    Obesity    Vitiligo    Essential hypertension    Chronic hypertension in obstetric context in third trimester    Maternal morbid obesity, antepartum ([address] )    Positive KEELY (antinuclear antibody)    Abnormal results of thyroid function studies    COVID-19 affecting pregnancy in second trimester    36 weeks gestation of pregnancy    Anemia affecting pregnancy in third trimester    Iron deficiency anemia of mother during pregnancy    37 weeks gestation of pregnancy     (spontaneous vaginal delivery)         Post-delivery Diagnosis: same, delivered    Procedure: Spontaneous Vaginal Delivery     Attending Physician: Dr Naun Bautista  Resident Physician: Dr Carlos Luna  Other Assistant: Dr Jason Pablo ( resident)    Anesthesia: Epidural    QBL: 758 cc    Complications: none apparent    Specimens:   1  Arterial and venous cord gases  2  Cord blood  3  Segment of umbilical cord  4  Placenta to storage     Findings:  1  Viable male on 2021 at 1222, with APGARS of 8 and 9 at 1 and 5 minutes respectively,  2  Spontaneous delivery of intact placenta at 1225  3  2 degree laceration repaired with 3-0 Vicryl Rapide    Gases:  Umbilical Cord Venous Blood Gas:  Results from last 7 days   Lab Units 21  1226   PH COV  7 413   PCO2 COV mm HG 34 7   HCO3 COV mmol/L 21 7   BASE EXC COV mmol/L -2 1*   O2 CT CD VB mL/dL 18 8   O2 HGB, VENOUS CORD % 65 7     Umbilical Cord Arterial Blood Gas:  Results from last 7 days   Lab Units 21  1226   PH COA  7 222*   PCO2 COA  62 2*   PO2 COA mm HG 23 9   HCO3 COA mmol/L 25 0   BASE EXC COA mmol/L -4 2*   O2 CONTENT CORD ART ml/dl 12 1   O2 HGB, ARTERIAL CORD % 52 0       Brief history and labor course:  Cathie Enrique is a 19 y o  at 37w1d   She presented to labor and delivery for an induction of labor secondary to cHTN with SI PreE w/o SF  Her pregnancy was complicated by the above problem list  On exam in triage she was noted to be //60/-2, she was induced with FB/cytotec  Amniotomy was performed for clear fluid  She was started on Pitocin  She had occasional severe range blood pressures, but they were always followed by a non severe range blood pressure and therefore did not meet criteria for magnesium or treatment with IV labetalol  She received an epidural for pain control  She progressed to be complete and started pushing    Description of delivery:    After pushing for 22 minutes, patient delivered a viable male , wt pending as mother is doing skin to skin bonding  The fetal vertex delivered direct OA position spontaneously  There was an easily reduced nuchal cord x1  The left anterior shoulder delivered atraumatically with maternal expulsive forces and the assistance of gentle downward traction  The right posterior shoulder delivered with maternal expulsive forces and the assistance of gentle upward traction  The remainder of the fetus delivered spontaneously  Upon delivery, the infant was placed on the mothers abdomen and the cord was doubly clamped and cut  Delayed cord clamping was achieved  The infant was noted to cry spontaneously and was moving all extremities appropriately  There was no evidence for injury  Awaiting nurse resuscitators evaluated the   Arterial and venous cord blood gases and cord blood was collected for analysis  These were promptly sent to the lab  In the immediate post-partum, active management of the 3rd stage of labor was performed with massage, the administration of 30 units of IV pitocin, and gentle traction on the umbilical cord  The placenta delivered spontaneously and was noted to have a centrally inserted 3 vessel cord    The placenta was sent to storage     Laceration Repair  The vagina, cervix, perineum, and rectum were inspected and there was noted to be a second degree laceration  The patient was comfortable with her epidural for analgesia  The vaginal mucosa and submucosa were then re approximated using 3-0 vicryl rapide to the point of the hymenal ring  Interrupted 3-0 vicryl was used to re approximate the muscle and fascia  The superficial perineal fascia was then re approximated using 3-0 vicryl in a running non locked stitch downward followed by a running subcuticular stitch upward to the level of the introitus  At the conclusion of the procedure, all needle, sponge, and instrument counts were noted to be correct  Dr Erlinda Morrow was present and participated in all key portions of the case  Disposition:  The patient and the  both tolerated the procedure well and are recovering in labor and delivery room       Vladimir Riley 89, DO  OB/GYN PGY-1  2021  12:56 PM

## 2021-06-01 NOTE — DISCHARGE SUMMARY
Discharge Summary - Kaleb Fuentes 19 y o  female MRN: [id number]    Unit/Bed#: -01 Encounter: [id number]    Admission Date: 2021     Discharge Date: 21      Admitting Diagnosis:   Patient Active Problem List   Diagnosis    Elevated TSH    Obesity    Vitiligo    Essential hypertension    Chronic hypertension in obstetric context in third trimester    Maternal morbid obesity, antepartum ([address] )    Positive KEELY (antinuclear antibody)    Abnormal results of thyroid function studies    COVID-19 affecting pregnancy in second trimester    36 weeks gestation of pregnancy    Anemia affecting pregnancy in third trimester    Iron deficiency anemia of mother during pregnancy    37 weeks gestation of pregnancy       Discharge Diagnosis:   Same, delivered    Procedures:   spontaneous vaginal delivery    Admitting Attending: Dr Austin Marquez  Delivery Attending: Dr Maria Elena Shelby  Discharge Attending: Dr Elisa Dietz Course:     Kaleb Fuentes is a 19 y o   who was admitted at 42w4d for an induction of labor for cHTN w/ SI Pre Eclampsia without Severe Features  She was started on pitocin  She was ruptured at 2206 for clear fluid  She progressed to be complete and started pushing  She delivered a viable male  on 21 at 12:22  Weight 7lbs 12oz via spontaneous vaginal delivery  Apgars were 8 (1 min) and 9 (5 min)   was transferred to  nursery  Patient tolerated the procedure well and was transferred to recovery in stable condition  Her post-partum course was complicated by severe range blood pressures for which she was started on magnesium  Shr received 24 hrs of PP magnesium and blood pressures were in the 100s-130s/60s-70s afterwards  Her post-partum pain was well controlled with oral analgesics  The patient's post partum course was unremarkable  She is bottle feeding    On day of discharge, she was ambulating and able to reasonably perform all ADLs    She was voiding and had appropriate bowel function  Pain was well controlled  She was discharged home on postpartum day #2 without complications  Patient was instructed to follow up with her OB as an outpatient and was given appropriate warnings to call doctor or provider if she develops signs of infection or uncontrolled pain  On day of discharge she was ambulating, voiding spontaneously, tolerating oral intake and hemodynamically stable  Mom's blood type is A positive and  Rhogam was not given  Disposition: Home    Planned Readmission: No    Discharge Medications:   Prenatal vitamin daily for 6 months or the duration of nursing, whichever is longer  Motrin 600 mg orally every 6 hours as needed for pain  Tylenol (over the counter) per bottle directions as needed for pain  Hydrocortisone cream 1% (over the counter) applied 1-2x daily to perineum as needed  Witch hazel pads for perineal discomfort as needed      Discharge instructions :   -Do not place anything (no partner, tampons or douche) in your vagina for 6 weeks  -You may walk for exercise for the first 6 weeks then gradually return to your usual activities    -Please do not drive for 1 week if you have no stitches and for 2 weeks if you have stitches or underwent a  delivery     -You may take baths or shower per your preference    -Please look at your bust (breasts) in the mirror daily and call your doctor for redness or tenderness or increased warmth  - If you have had a  section please look at your incision daily as well and call provider for increasing redness or steady drainage from the incision    -Please call your doctor's office if temperature > 100 4*F or 38* C, worsening pain or a foul discharge  Follow Up:  - Follow up in 1 week for blood pressure check  - F/u in 3 weeks for postpartum visit    Birdena Epley [address] , [facility] Gynecology PGY-1  6/3/2021  6:41 AM

## 2021-06-01 NOTE — QUICK NOTE
Torres Gonzáles had 2 severe range blood pressures in around 15 minutes apart, will give 20 of labetalol IV push and start magnesium  Discussed magnesium precautions with patient  She currently has no preeclampsia symptoms    Repeat blood pressure cuff went off when I was in the room, 136/60       D/w Dr Deepali Harden

## 2021-06-01 NOTE — OB LABOR/OXYTOCIN SAFETY PROGRESS
Oxytocin Safety Progress Check Note - Torrey Pale 19 y o  female MRN: [id number]    Unit/Bed#: -01 Encounter: [id number]    Dose (yara-units/min) Oxytocin: 14 yara-units/min  Contraction Frequency (minutes): 2-3  Contraction Quality: Moderate  Tachysystole: No   Cervical Dilation: 4        Cervical Effacement: 70  Fetal Station: -2  Baseline Rate: 115 bpm  Fetal Heart Rate: 120 BPM  FHR Category: Category I               Vital Signs:   Vitals:    06/01/21 0703   BP:    Pulse: 68   Resp:    Temp:    SpO2: 99%           Notes/comments: Pt s/p epidural and comfortable  Pit at 15  SVE above  Will continue to monitor  Dr Lonny Gregory MD 6/1/2021 7:19 AM

## 2021-06-01 NOTE — PLAN OF CARE
Problem: Potential for Falls  Goal: Patient will remain free of falls  Description: INTERVENTIONS:  - Assess patient frequently for physical needs  -  Identify cognitive and physical deficits and behaviors that affect risk of falls  -  [place] fall precautions as indicated by assessment   - Educate patient/family on patient safety including physical limitations  - Instruct patient to call for assistance with activity based on assessment  - Modify environment to reduce risk of injury  - Consider OT/PT consult to assist with strengthening/mobility  Outcome: Progressing     Problem: BIRTH - VAGINAL/ SECTION  Goal: Fetal and maternal status remain reassuring during the birth process  Description: INTERVENTIONS:  - Monitor vital signs  - Monitor fetal heart rate  - Monitor uterine activity  - Monitor labor progression (vaginal delivery)  - DVT prophylaxis  - Antibiotic prophylaxis  Outcome: Progressing  Goal: Emotionally satisfying birthing experience for mother/fetus  Description: Interventions:  - Assess, plan, implement and evaluate the nursing care given to the patient in labor  - Advocate the philosophy that each childbirth experience is a unique experience and support the family's chosen level of involvement and control during the labor process   - Actively participate in both the patient's and family's teaching of the birth process  - Consider cultural, [employer] and age-specific factors and plan care for the patient in labor  Outcome: Progressing     Problem: Knowledge Deficit  Goal: Verbalizes understanding of labor plan  Description: Assess patient/family/caregiver's baseline knowledge level and ability to understand information  Provide education via patient/family/caregiver's preferred learning method at appropriate level of understanding  1  Provide teaching at level of understanding  2  Provide teaching via preferred learning method(s)    Outcome: Progressing  Goal: Patient/family/caregiver demonstrates understanding of disease process, treatment plan, medications, and discharge instructions  Description: Complete learning assessment and assess knowledge base  Interventions:  - Provide teaching at level of understanding  - Provide teaching via preferred learning methods  Outcome: Progressing     Problem: Labor & Delivery  Goal: Manages discomfort  Description: Assess and monitor for signs and symptoms of discomfort  Assess patient's pain level regularly and per hospital policy  Administer medications as ordered  Support use of nonpharmacological methods to help control pain such as distraction, imagery, relaxation, and application of heat and cold  Collaborate with interdisciplinary team and patient to determine appropriate pain management plan  1  Include patient in decisions related to comfort  2  Offer non-pharmacological pain management interventions  3  Report ineffective pain management to physician  Outcome: Progressing  Goal: Patient vital signs are stable  Description: 1  Assess vital signs - vaginal delivery    Outcome: Progressing     Problem: PAIN - ADULT  Goal: Verbalizes/displays adequate comfort level or baseline comfort level  Description: Interventions:  - Encourage patient to monitor pain and request assistance  - Assess pain using appropriate pain scale  - Administer analgesics based on type and severity of pain and evaluate response  - Implement non-pharmacological measures as appropriate and evaluate response  - Consider cultural and social influences on pain and pain management  - Notify physician/advanced practitioner if interventions unsuccessful or patient reports new pain  Outcome: Progressing     Problem: INFECTION - ADULT  Goal: Absence or prevention of progression during hospitalization  Description: INTERVENTIONS:  - Assess and monitor for signs and symptoms of infection  - Monitor lab/diagnostic results  - Monitor all insertion sites, i e  indwelling lines, tubes, and drains    - [place] appropriate cooling/warming therapies per order  - Administer medications as ordered  - Instruct and encourage patient and family to use good hand hygiene technique  - Identify and instruct in appropriate isolation precautions for identified infection/condition  Outcome: Progressing  Goal: Absence of fever/infection during neutropenic period  Description: INTERVENTIONS:  - Monitor WBC as ordered    Outcome: Progressing     Problem: SAFETY ADULT  Goal: Maintain or return to baseline ADL function  Description: INTERVENTIONS:  -  Assess patient's ability to carry out ADLs; assess patient's baseline for ADL function and identify physical deficits which impact ability to perform ADLs (bathing, care of mouth/teeth, toileting, grooming, dressing, etc )  - Assess/evaluate cause of self-care deficits   - Assess range of motion  - Assess patient's mobility; develop plan if impaired  - Assess patient's need for assistive devices and provide as appropriate  - Encourage maximum independence but intervene and supervise when necessary  - Involve family in performance of ADLs  - Assess for home care needs following discharge   - Consider OT consult to assist with ADL evaluation and planning for discharge  - Provide patient education as appropriate  Outcome: Progressing  Goal: Maintain or return mobility status to optimal level  Description: INTERVENTIONS:  - Assess patient's baseline mobility status (ambulation, transfers, stairs, etc )    - Identify cognitive and physical deficits and behaviors that affect mobility  - Identify mobility aids required to assist with transfers and/or ambulation (gait belt, sit-to-stand, lift, walker, cane, etc )  - [place] fall precautions as indicated by assessment  - Record patient progress and toleration of activity level on Mobility SBAR; progress patient to next Phase/Stage  - Instruct patient to call for assistance with activity based on assessment  - Consider rehabilitation consult to assist with strengthening/weightbearing, etc   Outcome: Progressing     Problem: DISCHARGE PLANNING  Goal: Discharge to home or other facility with appropriate resources  Description: INTERVENTIONS:  - Identify barriers to discharge w/patient and caregiver  - Arrange for needed discharge resources and transportation as appropriate  - Identify discharge learning needs   - Arrange for interpretive services to assist at discharge as needed  - Refer to Case Management Department for coordinating discharge planning if the patient needs post-hospital services based on physician/advanced practitioner order or complex needs related to functional status, cognitive ability, or social support system  Outcome: Progressing

## 2021-06-01 NOTE — OB LABOR/OXYTOCIN SAFETY PROGRESS
Oxytocin Safety Progress Check Note - Roberto Seth 19 y o  female MRN: [id number]    Unit/Bed#: -01 Encounter: [id number]    Dose (yara-units/min) Oxytocin: 8 yara-units/min  Contraction Frequency (minutes): irritable /one ctx noted  Contraction Quality: Not applicable  Tachysystole: Yes   Cervical Dilation: 2-3        Cervical Effacement: 60  Fetal Station: -2  Baseline Rate: 120 bpm  Fetal Heart Rate: 150 BPM  FHR Category: Category I               Vital Signs:   Vitals:    06/01/21 0029   BP: 136/82   Pulse: 80   Resp: 18   Temp: 98 6 °F (37 °C)   SpO2: 98%           Notes/comments: Pt 2h on pit and comfortable  Will defer check for now  FHT category 1  BP non severe  Will continue to monitor          Kirby Vergara MD 6/1/2021 12:43 AM

## 2021-06-01 NOTE — ANESTHESIA PREPROCEDURE EVALUATION
Procedure:  LABOR ANALGESIA    Relevant Problems   ANESTHESIA (within normal limits)      CARDIO   (+) Chronic hypertension in obstetric context in third trimester   (+) Essential hypertension      GYN   (+) 36 weeks gestation of pregnancy      HEMATOLOGY   (+) Anemia affecting pregnancy in third trimester   (+) Iron deficiency anemia of mother during pregnancy      Other   (+) Maternal morbid obesity, antepartum ([facility] [address] )     Labs:   Results from last 7 days   Lab Units 05/31/21  2147   WBC Thousand/uL 11 31*   HEMOGLOBIN g/dL 9 8*   HEMATOCRIT % 31 4*   PLATELETS Thousands/uL 388     Results from last 7 days   Lab Units 05/31/21  2147   SODIUM mmol/L 138   POTASSIUM mmol/L 4 0   CHLORIDE mmol/L 103   CO2 mmol/L 24   ANION GAP mmol/L 11   BUN mg/dL 10   CREATININE mg/dL 1 04   EGFR ml/min/1 73sq m 76   CALCIUM mg/dL 8 7   GLUCOSE RANDOM mg/dL 76   ALT U/L 28   AST U/L 26   ALK PHOS U/L 184*   ALBUMIN g/dL 2 3*   TOTAL BILIRUBIN mg/dL 0 22                       ABG:    VBG:                Type and Screen:  Results from last 7 days   Lab Units 05/31/21  2147   ABO GROUPING  A   RH FACTOR  Positive   ANTIBODY SCREEN  Negative   SPECIMEN EXPIRATION DATE  [id number]                Physical Exam    Airway    Mallampati score: II  TM Distance: >3 FB  Neck ROM: full     Dental   No notable dental hx     Cardiovascular  Cardiovascular exam normal    Pulmonary  Pulmonary exam normal     Other Findings        Anesthesia Plan  ASA Score- 2     Anesthesia Type- epidural with ASA Monitors  Additional Monitors:   Airway Plan:     Comment: Patient requesting epidural for labor  Patient seen and examined  Risks/benefits of Epidural anesthesia discussed including risk of PDPH, partial or failed block, and rare emergencies including infection, nerve injury and total spinal anesthesia  Anesthetic plan for potential c/s reviewed with patient          Plan Factors-    Chart reviewed  Existing labs reviewed   Patient summary reviewed  Induction-     Postoperative Plan-     Informed Consent- Anesthetic plan and risks discussed with patient  I personally reviewed this patient with the CRNA  Discussed and agreed on the Anesthesia Plan with the CRNA  Chano Shields

## 2021-06-01 NOTE — OB LABOR/OXYTOCIN SAFETY PROGRESS
Oxytocin Safety Progress Check Note - Charna Heimlich 19 y o  female MRN: [id number]    Unit/Bed#: -01 Encounter: [id number]    Dose (yara-units/min) Oxytocin: 14 yara-units/min  Contraction Frequency (minutes): irritable /1 5-4 5  Contraction Quality: Moderate  Tachysystole: No   Cervical Dilation: 3-4        Cervical Effacement: 70  Fetal Station: -2  Baseline Rate: 120 bpm  Fetal Heart Rate: 120 BPM  FHR Category: Category I               Vital Signs:   Vitals:    06/01/21 0302   BP:    Pulse: 81   Resp: 18   Temp: 98 2 °F (36 8 °C)   SpO2: 99%           Notes/comments: Pt is becoming more uncomfortable with contractions  Pit is at 15  SVE as above  Not ready for epidural, requesting stadol  FHT reactive will give stadol  Dr Chrystie Ormond aware          René Sprague MD 6/1/2021 3:13 AM

## 2021-06-01 NOTE — OB LABOR/OXYTOCIN SAFETY PROGRESS
Oxytocin Safety Progress Check Note - Alma Rosa Whyte 19 y o  female MRN: [id number]    Unit/Bed#: LD - Encounter: [id number]    Dose (yara-units/min) Oxytocin: 18 yara-units/min  Contraction Frequency (minutes): (x 2)  Contraction Quality: Strong  Tachysystole: No   Cervical Dilation: 10  Dilation Complete Date: 21  Dilation Complete Time: 1149  Cervical Effacement: 100  Fetal Station: 2  Baseline Rate: 130 bpm  Fetal Heart Rate: 134 BPM  FHR Category: Category I  Oxytocin Safety Progress Check: Safety check completed            Vital Signs:   Vitals:    21 1145   BP: 136/75   Pulse: 88   Resp:    Temp:    SpO2:            Notes/comments:    Pushing well, Dr Rashmi Watts on her way in   [facility]    Smita Farnsworth DO 2021 12:08 PM

## 2021-06-02 LAB
ALBUMIN SERPL BCP-MCNC: 2 G/DL (ref 3.5–5)
ALBUMIN SERPL BCP-MCNC: 2 G/DL (ref 3.5–5)
ALP SERPL-CCNC: 154 U/L (ref 46–116)
ALP SERPL-CCNC: 172 U/L (ref 46–116)
ALT SERPL W P-5'-P-CCNC: 23 U/L (ref 12–78)
ALT SERPL W P-5'-P-CCNC: 26 U/L (ref 12–78)
ANION GAP SERPL CALCULATED.3IONS-SCNC: 4 MMOL/L (ref 4–13)
ANION GAP SERPL CALCULATED.3IONS-SCNC: 9 MMOL/L (ref 4–13)
AST SERPL W P-5'-P-CCNC: 35 U/L (ref 5–45)
AST SERPL W P-5'-P-CCNC: 37 U/L (ref 5–45)
BILIRUB SERPL-MCNC: 0.16 MG/DL (ref 0.2–1)
BILIRUB SERPL-MCNC: 0.19 MG/DL (ref 0.2–1)
BUN SERPL-MCNC: 8 MG/DL (ref 5–25)
BUN SERPL-MCNC: 8 MG/DL (ref 5–25)
CALCIUM ALBUM COR SERPL-MCNC: 9.5 MG/DL (ref 8.3–10.1)
CALCIUM ALBUM COR SERPL-MCNC: 9.6 MG/DL (ref 8.3–10.1)
CALCIUM SERPL-MCNC: 7.9 MG/DL (ref 8.3–10.1)
CALCIUM SERPL-MCNC: 8 MG/DL (ref 8.3–10.1)
CHLORIDE SERPL-SCNC: 104 MMOL/L (ref 100–108)
CHLORIDE SERPL-SCNC: 106 MMOL/L (ref 100–108)
CO2 SERPL-SCNC: 23 MMOL/L (ref 21–32)
CO2 SERPL-SCNC: 28 MMOL/L (ref 21–32)
CREAT SERPL-MCNC: 1.13 MG/DL (ref 0.6–1.3)
CREAT SERPL-MCNC: 1.13 MG/DL (ref 0.6–1.3)
ERYTHROCYTE [DISTWIDTH] IN BLOOD BY AUTOMATED COUNT: 15.1 % (ref 11.6–15.1)
GFR SERPL CREATININE-BSD FRML MDRD: 69 ML/MIN/1.73SQ M
GFR SERPL CREATININE-BSD FRML MDRD: 69 ML/MIN/1.73SQ M
GLUCOSE SERPL-MCNC: 93 MG/DL (ref 65–140)
GLUCOSE SERPL-MCNC: 93 MG/DL (ref 65–140)
HCT VFR BLD AUTO: 28.9 % (ref 34.8–46.1)
HGB BLD-MCNC: 8.8 G/DL (ref 11.5–15.4)
MAGNESIUM SERPL-MCNC: 6.5 MG/DL (ref 1.6–2.6)
MAGNESIUM SERPL-MCNC: 7.2 MG/DL (ref 1.6–2.6)
MCH RBC QN AUTO: 25.7 PG (ref 26.8–34.3)
MCHC RBC AUTO-ENTMCNC: 30.4 G/DL (ref 31.4–37.4)
MCV RBC AUTO: 84 FL (ref 82–98)
PLATELET # BLD AUTO: 356 THOUSANDS/UL (ref 149–390)
PMV BLD AUTO: 9.3 FL (ref 8.9–12.7)
POTASSIUM SERPL-SCNC: 4.2 MMOL/L (ref 3.5–5.3)
POTASSIUM SERPL-SCNC: 4.4 MMOL/L (ref 3.5–5.3)
PROT SERPL-MCNC: 6.5 G/DL (ref 6.4–8.2)
PROT SERPL-MCNC: 6.7 G/DL (ref 6.4–8.2)
RBC # BLD AUTO: 3.43 MILLION/UL (ref 3.81–5.12)
SODIUM SERPL-SCNC: 136 MMOL/L (ref 136–145)
SODIUM SERPL-SCNC: 138 MMOL/L (ref 136–145)
WBC # BLD AUTO: 10.67 THOUSAND/UL (ref 4.31–10.16)

## 2021-06-02 PROCEDURE — 80053 COMPREHEN METABOLIC PANEL: CPT | Performed by: OBSTETRICS & GYNECOLOGY

## 2021-06-02 PROCEDURE — 99024 POSTOP FOLLOW-UP VISIT: CPT | Performed by: OBSTETRICS & GYNECOLOGY

## 2021-06-02 PROCEDURE — 85027 COMPLETE CBC AUTOMATED: CPT | Performed by: OBSTETRICS & GYNECOLOGY

## 2021-06-02 PROCEDURE — 83735 ASSAY OF MAGNESIUM: CPT | Performed by: OBSTETRICS & GYNECOLOGY

## 2021-06-02 RX ADMIN — SENNOSIDES 8.6 MG: 8.6 TABLET, FILM COATED ORAL at 08:27

## 2021-06-02 RX ADMIN — IRON SUCROSE 200 MG: 20 INJECTION, SOLUTION INTRAVENOUS at 16:49

## 2021-06-02 RX ADMIN — MAGNESIUM SULFATE HEPTAHYDRATE 2 G/HR: 40 INJECTION, SOLUTION INTRAVENOUS at 11:15

## 2021-06-02 RX ADMIN — Medication 1 TABLET: at 08:27

## 2021-06-02 RX ADMIN — LORATADINE 10 MG: 10 TABLET ORAL at 08:27

## 2021-06-02 RX ADMIN — DOCUSATE SODIUM 100 MG: 100 CAPSULE, LIQUID FILLED ORAL at 18:25

## 2021-06-02 RX ADMIN — MAGNESIUM SULFATE HEPTAHYDRATE 2 G/HR: 40 INJECTION, SOLUTION INTRAVENOUS at 01:23

## 2021-06-02 RX ADMIN — IBUPROFEN 600 MG: 600 TABLET, FILM COATED ORAL at 01:30

## 2021-06-02 RX ADMIN — LABETALOL HYDROCHLORIDE 200 MG: 200 TABLET, FILM COATED ORAL at 08:28

## 2021-06-02 RX ADMIN — DOCUSATE SODIUM 100 MG: 100 CAPSULE, LIQUID FILLED ORAL at 08:27

## 2021-06-02 RX ADMIN — LABETALOL HYDROCHLORIDE 200 MG: 200 TABLET, FILM COATED ORAL at 21:26

## 2021-06-02 NOTE — PROGRESS NOTES
Progress Note - OB/GYN   Martha Guo 19 y o  female MRN: [id number]  Unit/Bed#: -01 Encounter: [id number]    Assessment:  PPD#1 s/p Spontaneous Vaginal Delivery, stable    Plan:    1) Postpartum care  Encourage ambulation   Encourage breastfeeding  Continue current meds   2) Anemia   Hgb 9 8 --> 8 8   Was receiving venofer infusions antepartum   Consider venofer infusion postpartum   3) cHTN w/ SI PreE w/ SF   100s-120/60s-70s after initiation of mag   Continue magnesium for 24 hrs   No signs/sx of PreE at this time  4) BMI 46   Encourage ambulation  5) Disposition   Anticipate discharge home tomorrow    Subjective/Objective     Subjective:     Pain: no  Tolerating PO: yes  Voiding: yes  Flatus: yes  BM: yes  Ambulating: yes  Breastfeeding: Breastfeeding  Chest pain: no  Shortness of breath: no  Leg pain: no  Lochia: minimal    Objective:     Vitals:  Vitals:    06/02/21 0000 06/02/21 0159 06/02/21 0407 06/02/21 0602   BP: 129/67 107/71 123/70 117/69   BP Location: Left arm Left arm Left arm Left arm   Pulse: 79 77 75 80   Resp: 18 18 18 18   Temp: 97 8 °F (36 6 °C) 97 8 °F (36 6 °C) 97 7 °F (36 5 °C) 97 7 °F (36 5 °C)   TempSrc: Oral Oral Oral Oral   SpO2: 100% 97% 100% 100%   Weight:       Height:           Physical Exam:   GEN: appears well, alert and oriented x 3, pleasant and cooperative   CV: +S1, +S2, no murmurs/rubs/gallops appreciated  RESP: no labored breathing  ABDOMEN: soft, no tenderness, no distention, Uterine fundus firm and non-tender, -1 cm below the umbilicus   EXTREMITIES: non-tender  NEURO Alert and oriented to person, place, and time         Lab Results   Component Value Date    WBC 10 67 (H) 06/02/2021    HGB 8 8 (L) 06/02/2021    HCT 28 9 (L) 06/02/2021    MCV 84 06/02/2021     06/02/2021         Mikey Sloan DO, PGY-1  Obstetrics & Gynecology  06/02/21

## 2021-06-02 NOTE — PLAN OF CARE
Problem: Potential for Falls  Goal: Patient will remain free of falls  Description: INTERVENTIONS:  - Assess patient frequently for physical needs  -  Identify cognitive and physical deficits and behaviors that affect risk of falls    -  [place] fall precautions as indicated by assessment   - Educate patient/family on patient safety including physical limitations  - Instruct patient to call for assistance with activity based on assessment  - Modify environment to reduce risk of injury  - Consider OT/PT consult to assist with strengthening/mobility  Outcome: Progressing     Problem: PAIN - ADULT  Goal: Verbalizes/displays adequate comfort level or baseline comfort level  Description: Interventions:  - Encourage patient to monitor pain and request assistance  - Assess pain using appropriate pain scale  - Administer analgesics based on type and severity of pain and evaluate response  - Implement non-pharmacological measures as appropriate and evaluate response  - Consider cultural and social influences on pain and pain management  - Notify physician/advanced practitioner if interventions unsuccessful or patient reports new pain  Outcome: Progressing     Problem: INFECTION - ADULT  Goal: Absence or prevention of progression during hospitalization  Description: INTERVENTIONS:  - Assess and monitor for signs and symptoms of infection  - Monitor lab/diagnostic results  - Monitor all insertion sites, i e  indwelling lines, tubes, and drains  - Monitor endotracheal if appropriate and nasal secretions for changes in amount and color  - [place] appropriate cooling/warming therapies per order  - Administer medications as ordered  - Instruct and encourage patient and family to use good hand hygiene technique  - Identify and instruct in appropriate isolation precautions for identified infection/condition  Outcome: Progressing  Goal: Absence of fever/infection during neutropenic period  Description: INTERVENTIONS:  - Monitor WBC    Outcome: Progressing     Problem: SAFETY ADULT  Goal: Maintain or return to baseline ADL function  Description: INTERVENTIONS:  -  Assess patient's ability to carry out ADLs; assess patient's baseline for ADL function and identify physical deficits which impact ability to perform ADLs (bathing, care of mouth/teeth, toileting, grooming, dressing, etc )  - Assess/evaluate cause of self-care deficits   - Assess range of motion  - Assess patient's mobility; develop plan if impaired  - Assess patient's need for assistive devices and provide as appropriate  - Encourage maximum independence but intervene and supervise when necessary  - Involve family in performance of ADLs  - Assess for home care needs following discharge   - Consider OT consult to assist with ADL evaluation and planning for discharge  - Provide patient education as appropriate  Outcome: Progressing  Goal: Maintain or return mobility status to optimal level  Description: INTERVENTIONS:  - Assess patient's baseline mobility status (ambulation, transfers, stairs, etc )    - Identify cognitive and physical deficits and behaviors that affect mobility  - Identify mobility aids required to assist with transfers and/or ambulation (gait belt, sit-to-stand, lift, walker, cane, etc )  - [place] fall precautions as indicated by assessment  - Record patient progress and toleration of activity level on Mobility SBAR; progress patient to next Phase/Stage  - Instruct patient to call for assistance with activity based on assessment  - Consider rehabilitation consult to assist with strengthening/weightbearing, etc   Outcome: Progressing     Problem: DISCHARGE PLANNING  Goal: Discharge to home or other facility with appropriate resources  Description: INTERVENTIONS:  - Identify barriers to discharge w/patient and caregiver  - Arrange for needed discharge resources and transportation as appropriate  - Identify discharge learning needs (meds, wound care, etc )  - Arrange for interpretive services to assist at discharge as needed  - Refer to Case Management Department for coordinating discharge planning if the patient needs post-hospital services based on physician/advanced practitioner order or complex needs related to functional status, cognitive ability, or social support system  Outcome: Progressing

## 2021-06-03 VITALS
DIASTOLIC BLOOD PRESSURE: 71 MMHG | OXYGEN SATURATION: 100 % | BODY MASS INDEX: 41.95 KG/M2 | HEIGHT: 70 IN | TEMPERATURE: 98.5 F | SYSTOLIC BLOOD PRESSURE: 142 MMHG | HEART RATE: 73 BPM | RESPIRATION RATE: 18 BRPM | WEIGHT: 293 LBS

## 2021-06-03 PROBLEM — O11.9 CHRONIC HYPERTENSION WITH SUPERIMPOSED PREECLAMPSIA: Status: ACTIVE | Noted: 2021-06-03

## 2021-06-03 PROCEDURE — 99024 POSTOP FOLLOW-UP VISIT: CPT | Performed by: OBSTETRICS & GYNECOLOGY

## 2021-06-03 RX ORDER — IBUPROFEN 600 MG/1
600 TABLET ORAL EVERY 6 HOURS PRN
Qty: 30 TABLET | Refills: 0
Start: 2021-06-03 | End: 2021-12-22 | Stop reason: ALTCHOICE

## 2021-06-03 RX ADMIN — DOCUSATE SODIUM 100 MG: 100 CAPSULE, LIQUID FILLED ORAL at 08:51

## 2021-06-03 RX ADMIN — LORATADINE 10 MG: 10 TABLET ORAL at 08:51

## 2021-06-03 RX ADMIN — Medication 1 TABLET: at 08:51

## 2021-06-03 RX ADMIN — SENNOSIDES 8.6 MG: 8.6 TABLET, FILM COATED ORAL at 08:51

## 2021-06-03 RX ADMIN — LABETALOL HYDROCHLORIDE 200 MG: 200 TABLET, FILM COATED ORAL at 08:57

## 2021-06-03 NOTE — PROGRESS NOTES
Progress Note - OB/GYN   Martha Guo 19 y o  female MRN: [id number]  Unit/Bed#: -01 Encounter: [id number]    Assessment:  PPD#2 s/p Spontaneous Vaginal Delivery, stable    Plan:    1) Postpartum care  Encourage ambulation   She is bottle feeding  Continue current meds   2) Anemia   Hgb 9 8 --> 8 8   S/p venofer infusion postpartum   3) cHTN w/ SI PreE w/ SF   100s-130/60s-70s after initiation of mag   S/p 24 hrs of mag  4) BMI 46   Encourage ambulation  5) Contraception   Would like OCPs at 6 weeks  6) Disposition   Anticipate discharge home today    Subjective/Objective     Subjective:     Pain: no  Tolerating PO: yes  Voiding: yes  Flatus: yes  BM: yes  Ambulating: yes  Breastfeeding: Breastfeeding  Chest pain: no  Shortness of breath: no  Leg pain: no  Lochia: minimal    Objective:     Vitals:  Vitals:    06/02/21 1606 06/02/21 1944 06/02/21 2346 06/03/21 0344   BP: 123/65 134/66 139/73 130/72   BP Location:   Right arm Left arm   Pulse: 84 85 84 81   Resp: 18 18 18 18   Temp: 97 6 °F (36 4 °C) (!) 97 4 °F (36 3 °C) 98 4 °F (36 9 °C) 98 °F (36 7 °C)   TempSrc: Oral Oral Oral Oral   SpO2: 98%  100% 100%   Weight:       Height:           Physical Exam:   GEN: appears well, alert and oriented x 3, pleasant and cooperative   CV: +S1, +S2, no murmurs/rubs/gallops appreciated  RESP: no labored breathing  ABDOMEN: soft, no tenderness, no distention, Uterine fundus firm and non-tender, -1 cm below the umbilicus   EXTREMITIES: non-tender  NEURO Alert and oriented to person, place, and time         Lab Results   Component Value Date    WBC 10 67 (H) 06/02/2021    HGB 8 8 (L) 06/02/2021    HCT 28 9 (L) 06/02/2021    MCV 84 06/02/2021     06/02/2021         Mikey Sloan DO, PGY-1  Obstetrics & Gynecology  06/03/21

## 2021-06-03 NOTE — PLAN OF CARE
Problem: Potential for Falls  Goal: Patient will remain free of falls  Description: INTERVENTIONS:  - Assess patient frequently for physical needs  -  Identify cognitive and physical deficits and behaviors that affect risk of falls    -  [place] fall precautions as indicated by assessment   - Educate patient/family on patient safety including physical limitations  - Instruct patient to call for assistance with activity based on assessment  - Modify environment to reduce risk of injury  - Consider OT/PT consult to assist with strengthening/mobility  Outcome: Progressing     Problem: PAIN - ADULT  Goal: Verbalizes/displays adequate comfort level or baseline comfort level  Description: Interventions:  - Encourage patient to monitor pain and request assistance  - Assess pain using appropriate pain scale  - Administer analgesics based on type and severity of pain and evaluate response  - Implement non-pharmacological measures as appropriate and evaluate response  - Consider cultural and social influences on pain and pain management  - Notify physician/advanced practitioner if interventions unsuccessful or patient reports new pain  Outcome: Progressing     Problem: INFECTION - ADULT  Goal: Absence or prevention of progression during hospitalization  Description: INTERVENTIONS:  - Assess and monitor for signs and symptoms of infection  - Monitor lab/diagnostic results  - Monitor all insertion sites, i e  indwelling lines, tubes, and drains  - Monitor endotracheal if appropriate and nasal secretions for changes in amount and color  - [place] appropriate cooling/warming therapies per order  - Administer medications as ordered  - Instruct and encourage patient and family to use good hand hygiene technique  - Identify and instruct in appropriate isolation precautions for identified infection/condition  Outcome: Progressing  Goal: Absence of fever/infection during neutropenic period  Description: INTERVENTIONS:  - Monitor WBC    Outcome: Progressing     Problem: SAFETY ADULT  Goal: Maintain or return to baseline ADL function  Description: INTERVENTIONS:  -  Assess patient's ability to carry out ADLs; assess patient's baseline for ADL function and identify physical deficits which impact ability to perform ADLs (bathing, care of mouth/teeth, toileting, grooming, dressing, etc )  - Assess/evaluate cause of self-care deficits   - Assess range of motion  - Assess patient's mobility; develop plan if impaired  - Assess patient's need for assistive devices and provide as appropriate  - Encourage maximum independence but intervene and supervise when necessary  - Involve family in performance of ADLs  - Assess for home care needs following discharge   - Consider OT consult to assist with ADL evaluation and planning for discharge  - Provide patient education as appropriate  Outcome: Progressing  Goal: Maintain or return mobility status to optimal level  Description: INTERVENTIONS:  - Assess patient's baseline mobility status (ambulation, transfers, stairs, etc )    - Identify cognitive and physical deficits and behaviors that affect mobility  - Identify mobility aids required to assist with transfers and/or ambulation (gait belt, sit-to-stand, lift, walker, cane, etc )  - [place] fall precautions as indicated by assessment  - Record patient progress and toleration of activity level on Mobility SBAR; progress patient to next Phase/Stage  - Instruct patient to call for assistance with activity based on assessment  - Consider rehabilitation consult to assist with strengthening/weightbearing, etc   Outcome: Progressing     Problem: DISCHARGE PLANNING  Goal: Discharge to home or other facility with appropriate resources  Description: INTERVENTIONS:  - Identify barriers to discharge w/patient and caregiver  - Arrange for needed discharge resources and transportation as appropriate  - Identify discharge learning needs (meds, wound care, etc )  - Arrange for interpretive services to assist at discharge as needed  - Refer to Case Management Department for coordinating discharge planning if the patient needs post-hospital services based on physician/advanced practitioner order or complex needs related to functional status, cognitive ability, or social support system  Outcome: Progressing     Problem: POSTPARTUM  Goal: Experiences normal postpartum course  Description: INTERVENTIONS:  - Monitor maternal vital signs  - Assess uterine involution and lochia  Outcome: Progressing  Goal: Appropriate maternal -  bonding  Description: INTERVENTIONS:  - Identify family support  - Assess for appropriate maternal/infant bonding   -Encourage maternal/infant bonding opportunities  - Referral to  or  as needed  Outcome: Progressing  Goal: Establishment of infant feeding pattern  Description: INTERVENTIONS:  - Assess breast/bottle feeding  - Refer to lactation as needed  Outcome: Progressing  Goal: Incision(s), wounds(s) or drain site(s) healing without S/S of infection  Description: INTERVENTIONS  - Assess and document risk factors for skin impairment   - Assess and document dressing, incision, wound bed, drain sites and surrounding tissue  - Consider nutrition services referral as needed  - Oral mucous membranes remain intact  - Provide patient/ family education  Outcome: Progressing

## 2021-06-04 ENCOUNTER — OFFICE VISIT (OUTPATIENT)
Dept: FAMILY MEDICINE CLINIC | Facility: CLINIC | Age: 21
End: 2021-06-04
Payer: OTHER GOVERNMENT

## 2021-06-04 ENCOUNTER — TRANSCRIBE ORDERS (OUTPATIENT)
Dept: ADMINISTRATIVE | Facility: HOSPITAL | Age: 21
End: 2021-06-04

## 2021-06-04 ENCOUNTER — APPOINTMENT (OUTPATIENT)
Dept: LAB | Facility: HOSPITAL | Age: 21
End: 2021-06-04
Attending: FAMILY MEDICINE
Payer: OTHER GOVERNMENT

## 2021-06-04 ENCOUNTER — TRANSITIONAL CARE MANAGEMENT (OUTPATIENT)
Dept: FAMILY MEDICINE CLINIC | Facility: CLINIC | Age: 21
End: 2021-06-04

## 2021-06-04 VITALS
HEART RATE: 96 BPM | HEIGHT: 70 IN | OXYGEN SATURATION: 99 % | SYSTOLIC BLOOD PRESSURE: 124 MMHG | WEIGHT: 292 LBS | BODY MASS INDEX: 41.8 KG/M2 | RESPIRATION RATE: 16 BRPM | TEMPERATURE: 97.5 F | DIASTOLIC BLOOD PRESSURE: 84 MMHG

## 2021-06-04 DIAGNOSIS — D50.9 IRON DEFICIENCY ANEMIA, UNSPECIFIED IRON DEFICIENCY ANEMIA TYPE: ICD-10-CM

## 2021-06-04 DIAGNOSIS — I10 ESSENTIAL HYPERTENSION: ICD-10-CM

## 2021-06-04 LAB
BASOPHILS # BLD AUTO: 0.03 THOUSANDS/ΜL (ref 0–0.1)
BASOPHILS NFR BLD AUTO: 0 % (ref 0–1)
EOSINOPHIL # BLD AUTO: 0.22 THOUSAND/ΜL (ref 0–0.61)
EOSINOPHIL NFR BLD AUTO: 2 % (ref 0–6)
ERYTHROCYTE [DISTWIDTH] IN BLOOD BY AUTOMATED COUNT: 15 % (ref 11.6–15.1)
FERRITIN SERPL-MCNC: 944 NG/ML (ref 8–388)
HCT VFR BLD AUTO: 35 % (ref 34.8–46.1)
HGB BLD-MCNC: 10.3 G/DL (ref 11.5–15.4)
IMM GRANULOCYTES # BLD AUTO: 0.09 THOUSAND/UL (ref 0–0.2)
IMM GRANULOCYTES NFR BLD AUTO: 1 % (ref 0–2)
IRON SATN MFR SERPL: 13 %
IRON SERPL-MCNC: 51 UG/DL (ref 50–170)
LYMPHOCYTES # BLD AUTO: 1.61 THOUSANDS/ΜL (ref 0.6–4.47)
LYMPHOCYTES NFR BLD AUTO: 17 % (ref 14–44)
MCH RBC QN AUTO: 25.6 PG (ref 26.8–34.3)
MCHC RBC AUTO-ENTMCNC: 29.4 G/DL (ref 31.4–37.4)
MCV RBC AUTO: 87 FL (ref 82–98)
MONOCYTES # BLD AUTO: 0.44 THOUSAND/ΜL (ref 0.17–1.22)
MONOCYTES NFR BLD AUTO: 5 % (ref 4–12)
NEUTROPHILS # BLD AUTO: 6.89 THOUSANDS/ΜL (ref 1.85–7.62)
NEUTS SEG NFR BLD AUTO: 75 % (ref 43–75)
NRBC BLD AUTO-RTO: 0 /100 WBCS
PLATELET # BLD AUTO: 425 THOUSANDS/UL (ref 149–390)
PMV BLD AUTO: 9.1 FL (ref 8.9–12.7)
RBC # BLD AUTO: 4.02 MILLION/UL (ref 3.81–5.12)
TIBC SERPL-MCNC: 390 UG/DL (ref 250–450)
WBC # BLD AUTO: 9.28 THOUSAND/UL (ref 4.31–10.16)

## 2021-06-04 PROCEDURE — 83550 IRON BINDING TEST: CPT

## 2021-06-04 PROCEDURE — 82728 ASSAY OF FERRITIN: CPT

## 2021-06-04 PROCEDURE — 85025 COMPLETE CBC W/AUTO DIFF WBC: CPT

## 2021-06-04 PROCEDURE — 83540 ASSAY OF IRON: CPT

## 2021-06-04 PROCEDURE — 99495 TRANSJ CARE MGMT MOD F2F 14D: CPT | Performed by: FAMILY MEDICINE

## 2021-06-04 PROCEDURE — 36415 COLL VENOUS BLD VENIPUNCTURE: CPT

## 2021-06-04 NOTE — PROGRESS NOTES
TRANSITION OF CARE OFFICE VISIT  [facility]    NAME: Azael Crabtree  AGE: 19 y o  SEX: female  : 2000     DATE: 2021     Assessment and Plan:     Diagnoses and all orders for this visit:    Spontaneous vaginal delivery    Iron deficiency anemia, unspecified iron deficiency anemia type  -     CBC and differential; Future  -     Iron Panel (Includes Ferritin, Iron Sat%, Iron, and TIBC); Future    Essential hypertension      Recently gave birth to healthy baby boy  No complications during   Delivery   however patient did have a complicated pregnancy with hypertension and iron deficiencies  She did require 1 dose of Venofer before discharge  CBC will be performed to be sure that hemoglobin has improved  BP very well controlled  Discussed with the patient that her blood pressure demand might be decreased with the loss of weight in the future  Recommend keeping a blood pressure log at home  If she notices hypotension to give us a call to lower her dosage  Counseling:     · Medication Side Effects: Adverse side effects of medications were reviewed with the patient/guardian today  · I have spent 30 minutes with Patient and family today in which greater than 50% of this time was spent in counseling/coordination of care regarding Diagnostic results, Prognosis, Risks and benefits of tx options, Intructions for management, Patient and family education, Importance of tx compliance, Risk factor reductions and Impressions    · Barriers to treatment include: No identified barriers     Transitional Care Management Review:     Azael Crabtree is a 19 y o  female here for TCM follow-up    During the TCM phone call patient stated:    TCM Call (since 2021)     Date and time call was made  2021  8:51 AM    Hospital care reviewed  Records reviewed    [facility]        Date of Admission  21    Date of discharge 06/03/21    Diagnosis  vaginal birth    Disposition  Home    Were the patients medications reviewed and updated  Yes    Current Symptoms  None      TCM Call (since 5/4/2021)     Post hospital issues  None    Should patient be enrolled in anticoag monitoring? No    Scheduled for follow up? Yes    Patients specialists  Other (comment)    Other specialists names  Dr Lazo Course    Did you obtain your prescribed medications  Yes    Do you need help managing your prescriptions or medications  No    Is transportation to your appointment needed  No    I have advised the patient to call PCP with any new or worsening symptoms  erick seymour /aden 6/4/2021    Living Arrangements  Family members    Are you recieving any outpatient services  No    Are you recieving home care services  No    Are you using any community resources  No    Current waiver services  No    Have you fallen in the last 12 months  No    Interperter language line needed  No           HPI:       47-year-old female presents to the office after  Induction spontaneous vaginal delivery on  May 64RZ  Complicated by hypertension and iron deficiency  Patient did receive magnesium post delivery  Did receive 1 dose of Venofer prior to discharge  Currently taking labetalol 200 mg twice a day without any difficulties  Denying any symptoms of hypotension  Does not have a hematologist   Patient states that did not have iron deficiency prior her pregnancy  Denies any acute symptoms today  Denies normal vaginal bleeding stating it equals a   Light period  Passing gas appropriately  Denies any chest pain or shortness of breath    Denies any abnormal swelling    The following portions of the patient's history were reviewed and updated as appropriate: allergies, current medications, past family history, past medical history, past social history, past surgical history and problem list      Review of Systems:     Review of Systems   Constitutional: Negative for activity change, appetite change, chills, fatigue and fever  HENT: Negative for congestion  Respiratory: Negative for cough, chest tightness and shortness of breath  Cardiovascular: Negative for chest pain and leg swelling  Gastrointestinal: Negative for abdominal distention, abdominal pain, constipation, diarrhea, nausea and vomiting  Psychiatric/Behavioral:        Patient demonstrates no signs of depression per her and her    All other systems reviewed and are negative  Problem List:     Patient Active Problem List   Diagnosis    Elevated TSH    Obesity    Vitiligo    Essential hypertension    Chronic hypertension in obstetric context in third trimester    Maternal morbid obesity, antepartum ([address] )    Positive KEELY (antinuclear antibody)    Abnormal results of thyroid function studies    COVID-19 affecting pregnancy in second trimester    36 weeks gestation of pregnancy    Anemia affecting pregnancy in third trimester    Iron deficiency anemia of mother during pregnancy    37 weeks gestation of pregnancy     (spontaneous vaginal delivery)    Chronic hypertension with superimposed preeclampsia        Objective:     /84 (BP Location: Left arm, Patient Position: Sitting, Cuff Size: Large)   Pulse 96   Temp 97 5 °F (36 4 °C) (Temporal)   Resp 16   Ht 5' 10" (1 778 m)   Wt 132 kg (292 lb)   LMP 2020   SpO2 99%   BMI 41 90 kg/m²     Physical Exam  Vitals signs reviewed  Constitutional:       Appearance: She is well-developed  HENT:      Head: Normocephalic and atraumatic  Eyes:      Conjunctiva/sclera: Conjunctivae normal       Pupils: Pupils are equal, round, and reactive to light  Neck:      Musculoskeletal: Normal range of motion and neck supple  Cardiovascular:      Rate and Rhythm: Normal rate and regular rhythm  Heart sounds: Normal heart sounds  Pulmonary:      Effort: Pulmonary effort is normal  No respiratory distress        Breath sounds: Normal breath sounds  Musculoskeletal: Normal range of motion  Right lower leg: No edema  Left lower leg: No edema  Skin:     General: Skin is warm  Capillary Refill: Capillary refill takes less than 2 seconds  Neurological:      Mental Status: She is alert and oriented to person, place, and time  Psychiatric:         Mood and Affect: Mood normal          Behavior: Behavior normal          Thought Content: Thought content normal          Judgment: Judgment normal          Laboratory Results: I have personally reviewed the pertinent laboratory results/reports     Radiology/Other Diagnostic Testing Results: I have personally reviewed pertinent reports  No results found       Current Medications:     Outpatient Medications Prior to Visit   Medication Sig Dispense Refill    acetaminophen (TYLENOL) 500 mg tablet Take 1,000 mg by mouth every 6 (six) hours as needed for mild pain or headaches      docusate sodium (COLACE) 100 mg capsule Take 1 capsule (100 mg total) by mouth 2 (two) times a day 10 capsule 3    ibuprofen (MOTRIN) 600 mg tablet Take 1 tablet (600 mg total) by mouth every 6 (six) hours as needed for moderate pain 30 tablet 0    labetalol (NORMODYNE) 200 mg tablet take 1 tablet by mouth twice a day 180 tablet 1    loratadine (CLARITIN) 10 mg tablet Take 10 mg by mouth daily      Prenatal MV & Min w/FA-DHA (Prenatal Adult Gummy/DHA/FA) 0 4-25 MG CHEW Chew 2 tablets daily      witch hazel-glycerin (TUCKS) topical pad Apply 1 pad topically every 2 (two) hours as needed for irritation  0    cyclobenzaprine (FLEXERIL) 5 mg tablet Take 1 tablet (5 mg total) by mouth 3 (three) times a day as needed for muscle spasms (Patient not taking: Reported on 6/4/2021) 12 tablet 0    Doxylamine Succinate, Sleep, (UNISOM PO) Take 1 tablet by mouth daily at bedtime as needed       ferrous sulfate 324 (65 Fe) mg Take 1 tablet (324 mg total) by mouth 2 (two) times a day before meals (Patient not taking: Reported on 6/4/2021) 90 tablet 3     No facility-administered medications prior to visit          Ishmael Méndez MD  [address]

## 2021-06-07 LAB — PLACENTA IN STORAGE: NORMAL

## 2021-07-06 ENCOUNTER — POSTPARTUM VISIT (OUTPATIENT)
Dept: OBGYN CLINIC | Facility: CLINIC | Age: 21
End: 2021-07-06

## 2021-07-06 VITALS — WEIGHT: 293 LBS | BODY MASS INDEX: 43.96 KG/M2 | DIASTOLIC BLOOD PRESSURE: 80 MMHG | SYSTOLIC BLOOD PRESSURE: 124 MMHG

## 2021-07-06 DIAGNOSIS — Z30.011 ENCOUNTER FOR INITIAL PRESCRIPTION OF CONTRACEPTIVE PILLS: Primary | ICD-10-CM

## 2021-07-06 PROBLEM — Z3A.37 37 WEEKS GESTATION OF PREGNANCY: Status: RESOLVED | Noted: 2021-05-31 | Resolved: 2021-07-06

## 2021-07-06 PROBLEM — U07.1 COVID-19 AFFECTING PREGNANCY IN SECOND TRIMESTER: Status: RESOLVED | Noted: 2021-02-19 | Resolved: 2021-07-06

## 2021-07-06 PROBLEM — O11.9 CHRONIC HYPERTENSION WITH SUPERIMPOSED PREECLAMPSIA: Status: RESOLVED | Noted: 2021-06-03 | Resolved: 2021-07-06

## 2021-07-06 PROBLEM — Z3A.36 36 WEEKS GESTATION OF PREGNANCY: Status: RESOLVED | Noted: 2021-02-19 | Resolved: 2021-07-06

## 2021-07-06 PROBLEM — O99.210 MATERNAL MORBID OBESITY, ANTEPARTUM (HCC): Status: RESOLVED | Noted: 2020-11-20 | Resolved: 2021-07-06

## 2021-07-06 PROBLEM — E66.01 MATERNAL MORBID OBESITY, ANTEPARTUM (HCC): Status: RESOLVED | Noted: 2020-11-20 | Resolved: 2021-07-06

## 2021-07-06 PROBLEM — O99.013 ANEMIA AFFECTING PREGNANCY IN THIRD TRIMESTER: Status: RESOLVED | Noted: 2021-03-02 | Resolved: 2021-07-06

## 2021-07-06 PROBLEM — O98.512 COVID-19 AFFECTING PREGNANCY IN SECOND TRIMESTER: Status: RESOLVED | Noted: 2021-02-19 | Resolved: 2021-07-06

## 2021-07-06 PROBLEM — O10.913 CHRONIC HYPERTENSION IN OBSTETRIC CONTEXT IN THIRD TRIMESTER: Status: RESOLVED | Noted: 2020-11-20 | Resolved: 2021-07-06

## 2021-07-06 PROCEDURE — 99024 POSTOP FOLLOW-UP VISIT: CPT | Performed by: OBSTETRICS & GYNECOLOGY

## 2021-07-06 RX ORDER — NORGESTIMATE AND ETHINYL ESTRADIOL 0.25-0.035
1 KIT ORAL DAILY
Qty: 84 TABLET | Refills: 3 | Status: SHIPPED | OUTPATIENT
Start: 2021-07-06 | End: 2021-07-06

## 2021-07-06 NOTE — PROGRESS NOTES
Assessment/Plan     Normal postpartum exam      1  Contraception: OCP (estrogen/progesterone); to start in 2+ weeks, patient is monitoring BP at home  After patient left, I feel that she should probably avoid estrogen containing birth control and stick to a progesterone only method such as IUD, nexplanon, depo provera, or POP  LM for patient, will send [employer] message as well  D/C'd cOCP sent while patient was in office  2  Annual exam due in January  3  Bottle feeding  4  Increase activity as tolerated, may resume all normal activity  5  Anticipated return to work: 6 - 12 weeks post partum  Yohana Ivan is a 19 y o  female who presents for a postpartum visit  She is 4 weeks postpartum following a spontaneous vaginal delivery  I have fully reviewed the prenatal and intrapartum course  The delivery was at 37w1d gestation  Anesthesia: epidural  Laceration: 2nd degree  Bleeding no bleeding  Bowel function: normal  Bladder function: normal  Patient has notbeen sexually active  Desired contraception method is OCP (estrogen/progesterone)  Used ortho cyclen in past with success, discussed risks with hypertension  Postpartum depression screening: negative  EPDS : 0     Pediatrician: Dr Nubia Ramirez is doing well  Baby is feeding by bottle      Last Pap : 1/2021 ; no abnormalities  Gestational Diabetes: no  Pregnancy Complications: gestational HTN and pre-clampsia    The following portions of the patient's history were reviewed and updated as appropriate: allergies, current medications, past family history, past medical history, past social history, past surgical history and problem list       Current Outpatient Medications:     acetaminophen (TYLENOL) 500 mg tablet, Take 1,000 mg by mouth every 6 (six) hours as needed for mild pain or headaches, Disp: , Rfl:     cyclobenzaprine (FLEXERIL) 5 mg tablet, Take 1 tablet (5 mg total) by mouth 3 (three) times a day as needed for muscle spasms, Disp: 12 tablet, Rfl: 0    ibuprofen (MOTRIN) 600 mg tablet, Take 1 tablet (600 mg total) by mouth every 6 (six) hours as needed for moderate pain, Disp: 30 tablet, Rfl: 0    labetalol (NORMODYNE) 200 mg tablet, take 1 tablet by mouth twice a day, Disp: 180 tablet, Rfl: 1    loratadine (CLARITIN) 10 mg tablet, Take 10 mg by mouth daily, Disp: , Rfl:     Prenatal MV & Min w/FA-DHA (Prenatal Adult Gummy/DHA/FA) 0 4-25 MG CHEW, Chew 2 tablets daily, Disp: , Rfl:     docusate sodium (COLACE) 100 mg capsule, Take 1 capsule (100 mg total) by mouth 2 (two) times a day (Patient not taking: Reported on 7/6/2021), Disp: 10 capsule, Rfl: 3    Doxylamine Succinate, Sleep, (UNISOM PO), Take 1 tablet by mouth daily at bedtime as needed  (Patient not taking: Reported on 7/6/2021), Disp: , Rfl:     witch hazel-glycerin (TUCKS) topical pad, Apply 1 pad topically every 2 (two) hours as needed for irritation (Patient not taking: Reported on 7/6/2021), Disp:  , Rfl: 0    No Known Allergies    Review of Systems  Review of Systems   Constitutional: Negative for chills and fever  Respiratory: Negative for shortness of breath  Cardiovascular: Negative for chest pain, palpitations and leg swelling  Gastrointestinal: Negative for abdominal distention, abdominal pain, constipation, nausea and vomiting  Genitourinary: Positive for vaginal bleeding  Negative for difficulty urinating and vaginal discharge  Neurological: Negative for headaches  Objective      /80 (BP Location: Left arm, Patient Position: Sitting, Cuff Size: Large)   Wt (!) 139 kg (306 lb 6 4 oz)   LMP 09/14/2020   BMI 43 96 kg/m²     Physical Exam  Constitutional:       General: She is not in acute distress  Appearance: Normal appearance  She is well-developed  She is not ill-appearing  Pulmonary:      Effort: Pulmonary effort is normal  No respiratory distress  Neurological:      General: No focal deficit present        Mental Status: She is alert and oriented to person, place, and time  Psychiatric:         Mood and Affect: Mood normal          Behavior: Behavior normal          Thought Content: Thought content normal          Judgment: Judgment normal    Vitals and nursing note reviewed

## 2021-07-07 DIAGNOSIS — Z30.41 ENCOUNTER FOR SURVEILLANCE OF CONTRACEPTIVE PILLS: Primary | ICD-10-CM

## 2021-07-07 RX ORDER — ACETAMINOPHEN AND CODEINE PHOSPHATE 120; 12 MG/5ML; MG/5ML
1 SOLUTION ORAL DAILY
Qty: 84 TABLET | Refills: 3 | Status: SHIPPED | OUTPATIENT
Start: 2021-07-07 | End: 2022-01-03

## 2021-12-03 ENCOUNTER — APPOINTMENT (OUTPATIENT)
Dept: LAB | Facility: HOSPITAL | Age: 21
End: 2021-12-03
Payer: OTHER GOVERNMENT

## 2021-12-03 DIAGNOSIS — N92.6 MISSED PERIOD: ICD-10-CM

## 2021-12-03 LAB — B-HCG SERPL-ACNC: 281 MIU/ML

## 2021-12-03 PROCEDURE — 36415 COLL VENOUS BLD VENIPUNCTURE: CPT

## 2021-12-03 PROCEDURE — 84702 CHORIONIC GONADOTROPIN TEST: CPT

## 2021-12-06 ENCOUNTER — TELEPHONE (OUTPATIENT)
Dept: FAMILY MEDICINE CLINIC | Facility: CLINIC | Age: 21
End: 2021-12-06

## 2021-12-06 ENCOUNTER — APPOINTMENT (OUTPATIENT)
Dept: LAB | Facility: HOSPITAL | Age: 21
End: 2021-12-06
Payer: OTHER GOVERNMENT

## 2021-12-06 DIAGNOSIS — N92.6 MISSED PERIOD: ICD-10-CM

## 2021-12-06 LAB — B-HCG SERPL-ACNC: 961 MIU/ML

## 2021-12-06 PROCEDURE — 84702 CHORIONIC GONADOTROPIN TEST: CPT

## 2021-12-06 PROCEDURE — 36415 COLL VENOUS BLD VENIPUNCTURE: CPT

## 2021-12-22 ENCOUNTER — OFFICE VISIT (OUTPATIENT)
Dept: OBGYN CLINIC | Facility: CLINIC | Age: 21
End: 2021-12-22
Payer: OTHER GOVERNMENT

## 2021-12-22 VITALS
HEIGHT: 70 IN | BODY MASS INDEX: 41.95 KG/M2 | WEIGHT: 293 LBS | SYSTOLIC BLOOD PRESSURE: 132 MMHG | DIASTOLIC BLOOD PRESSURE: 70 MMHG

## 2021-12-22 DIAGNOSIS — N91.2 AMENORRHEA: Primary | ICD-10-CM

## 2021-12-22 PROCEDURE — 99213 OFFICE O/P EST LOW 20 MIN: CPT | Performed by: PHYSICIAN ASSISTANT

## 2021-12-22 PROCEDURE — 76801 OB US < 14 WKS SINGLE FETUS: CPT | Performed by: PHYSICIAN ASSISTANT

## 2022-01-11 PROCEDURE — U0003 INFECTIOUS AGENT DETECTION BY NUCLEIC ACID (DNA OR RNA); SEVERE ACUTE RESPIRATORY SYNDROME CORONAVIRUS 2 (SARS-COV-2) (CORONAVIRUS DISEASE [COVID-19]), AMPLIFIED PROBE TECHNIQUE, MAKING USE OF HIGH THROUGHPUT TECHNOLOGIES AS DESCRIBED BY CMS-2020-01-R: HCPCS | Performed by: FAMILY MEDICINE

## 2022-01-11 PROCEDURE — U0005 INFEC AGEN DETEC AMPLI PROBE: HCPCS | Performed by: FAMILY MEDICINE

## 2022-01-13 ENCOUNTER — TELEPHONE (OUTPATIENT)
Dept: FAMILY MEDICINE CLINIC | Facility: CLINIC | Age: 22
End: 2022-01-13

## 2022-01-24 ENCOUNTER — INITIAL PRENATAL (OUTPATIENT)
Dept: OBGYN CLINIC | Facility: CLINIC | Age: 22
End: 2022-01-24

## 2022-01-24 VITALS
SYSTOLIC BLOOD PRESSURE: 134 MMHG | HEIGHT: 70 IN | WEIGHT: 293 LBS | BODY MASS INDEX: 41.95 KG/M2 | DIASTOLIC BLOOD PRESSURE: 78 MMHG

## 2022-01-24 DIAGNOSIS — Z3A.12 12 WEEKS GESTATION OF PREGNANCY: Primary | ICD-10-CM

## 2022-01-24 DIAGNOSIS — Z34.91 FIRST TRIMESTER PREGNANCY: ICD-10-CM

## 2022-01-24 PROCEDURE — OBC: Performed by: OBSTETRICS & GYNECOLOGY

## 2022-01-24 NOTE — PROGRESS NOTES
OB INTAKE INTERVIEW  Pt presents for OB intake  Plan:  - Prenatal labs ordered   -1 hour glucose ordered  BMI 47 58   -Hx of CHTN currently on Labetalol 200mg BID,  CMP and urine protein creatinine ratio ordered  - Referral given for MFM  - Reviewed Genetic testing options  - Patient to call for concerns  - RTO 2 weeks for OB F/U visit and PAP/Cultures   -Short interval pregnancy, recently delivered in 21        OB History    Para Term  AB Living   2 1 1     1   SAB IAB Ectopic Multiple Live Births         0 1      # Outcome Date GA Lbr Mac/2nd Weight Sex Delivery Anes PTL Lv   2 Current            1 Term 21 37w1d / 00:33 3535 g (7 lb 12 7 oz) M Vag-Spont EPI N FRIEDA      Complications: Anemia      Obstetric Comments   Chronic HTN, anemia receive iron infusions      Hx of  delivery prior to 36 weeks 6 days:  No  Last Menstrual Period:  21    Ultrasound date: 21 7 weeks 2 days     Estimated Date of Delivery:  22    Current Issues:  Constipation :   No  Headaches :   No  Cramping:  No  Spotting :   No      Interview education   [facility] Pregnancy Essentials reviewed and discussed    Baby and Me [facility] Handout   [employer] MFM Handout   Discussed genetic testing   Prenatal lab work: Scripts printed and given to pt   Influenza vaccine given today: No   Discussed Tdap vaccine     Immunizations:   Immunization History   Administered Date(s) Administered    Hep A, ped/adol, 3 dose 2013, 2014    Hepatitis B [birth year]    Meningococcal MCV4, Unspecified 2009, 2016    Tdap 2009, 2021    Tuberculin Skin Test-PPD Intradermal 10/12/2016    Varicella 2016         Prior Pregnancy Delivery Complications   History of  delivery or PPROM: No  History of Shoulder Dystocia: No   History of vacuum or forceps delivery: No   History of 3rd/4th degree laceration: No   History of  section: No     Diabetes              Pregestational DM: No                hx of GDM: No              BMI >35: Yes              first degree relative with type 2 diabetes: No              hx of PCOS: No              current metformin use: No              prior hx of LGA/macrosomia: Patient delivered at 40 weeks, baby weighted 7lbs 12oz                Hypertension              Hx of chronic HTN: Yes              hx of gestational HTN: No              hx of preeclampsia, eclampsia, or HELLP syndrome: No              Family h/o preeclampsia: No              Age 28 or older: No              Multifetal gestation: No  Type 1 or Type 2 DM: No  Renal Disease: No  Autoimmune disease (systemic lupus erythematosus, antiphospholipid antibody syndrome): No  Nulliparity: No  Obesity (BMI over 30): yes  More than 10 year pregnancy interval: No   Previous IUGR, low birthweight or small for gestational age: No  `         Immunizations:              influenza vaccine: Declined              discussed Tdap vaccine administration at 27-28 weeks   Covid Vaccination: Unvaccinated, recently had COVID 4 weeks ago     Dental visit with last 6 months - No   PHQ-2/9 score: 0  MyChart activated (not 1518 years of age)?: Yes    The patient was oriented to our practice and all questions were answered    Interviewed by: Eliane Clark RN 01/24/22

## 2022-01-28 ENCOUNTER — TELEPHONE (OUTPATIENT)
Dept: PERINATAL CARE | Facility: OTHER | Age: 22
End: 2022-01-28

## 2022-02-01 ENCOUNTER — ROUTINE PRENATAL (OUTPATIENT)
Dept: PERINATAL CARE | Facility: OTHER | Age: 22
End: 2022-02-01
Payer: OTHER GOVERNMENT

## 2022-02-01 ENCOUNTER — APPOINTMENT (OUTPATIENT)
Dept: LAB | Facility: HOSPITAL | Age: 22
End: 2022-02-01
Payer: OTHER GOVERNMENT

## 2022-02-01 VITALS
SYSTOLIC BLOOD PRESSURE: 139 MMHG | WEIGHT: 293 LBS | HEART RATE: 105 BPM | DIASTOLIC BLOOD PRESSURE: 81 MMHG | BODY MASS INDEX: 41.95 KG/M2 | HEIGHT: 70 IN

## 2022-02-01 DIAGNOSIS — Z3A.12 12 WEEKS GESTATION OF PREGNANCY: ICD-10-CM

## 2022-02-01 DIAGNOSIS — Z34.91 FIRST TRIMESTER PREGNANCY: ICD-10-CM

## 2022-02-01 DIAGNOSIS — O99.211 OBESITY AFFECTING PREGNANCY IN FIRST TRIMESTER: ICD-10-CM

## 2022-02-01 DIAGNOSIS — Z3A.13 13 WEEKS GESTATION OF PREGNANCY: ICD-10-CM

## 2022-02-01 DIAGNOSIS — I10 ESSENTIAL HYPERTENSION: ICD-10-CM

## 2022-02-01 DIAGNOSIS — O16.1 HYPERTENSION AFFECTING PREGNANCY IN FIRST TRIMESTER: Primary | ICD-10-CM

## 2022-02-01 LAB
ABO GROUP BLD: NORMAL
ALBUMIN SERPL BCP-MCNC: 3.1 G/DL (ref 3.5–5)
ALP SERPL-CCNC: 75 U/L (ref 46–116)
ALT SERPL W P-5'-P-CCNC: 21 U/L (ref 12–78)
ANION GAP SERPL CALCULATED.3IONS-SCNC: 9 MMOL/L (ref 4–13)
AST SERPL W P-5'-P-CCNC: 14 U/L (ref 5–45)
BASOPHILS # BLD AUTO: 0.01 THOUSANDS/ΜL (ref 0–0.1)
BASOPHILS NFR BLD AUTO: 0 % (ref 0–1)
BILIRUB SERPL-MCNC: 0.27 MG/DL (ref 0.2–1)
BLD GP AB SCN SERPL QL: NEGATIVE
BUN SERPL-MCNC: 8 MG/DL (ref 5–25)
CALCIUM ALBUM COR SERPL-MCNC: 9.5 MG/DL (ref 8.3–10.1)
CALCIUM SERPL-MCNC: 8.8 MG/DL (ref 8.3–10.1)
CHLORIDE SERPL-SCNC: 102 MMOL/L (ref 100–108)
CO2 SERPL-SCNC: 25 MMOL/L (ref 21–32)
CREAT SERPL-MCNC: 0.88 MG/DL (ref 0.6–1.3)
CREAT UR-MCNC: 233 MG/DL
EOSINOPHIL # BLD AUTO: 0.08 THOUSAND/ΜL (ref 0–0.61)
EOSINOPHIL NFR BLD AUTO: 1 % (ref 0–6)
ERYTHROCYTE [DISTWIDTH] IN BLOOD BY AUTOMATED COUNT: 12.8 % (ref 11.6–15.1)
GFR SERPL CREATININE-BSD FRML MDRD: 92 ML/MIN/1.73SQ M
GLUCOSE 1H P 50 G GLC PO SERPL-MCNC: 155 MG/DL (ref 40–134)
GLUCOSE P FAST SERPL-MCNC: 151 MG/DL (ref 65–99)
HBV SURFACE AG SER QL: NORMAL
HCT VFR BLD AUTO: 35 % (ref 34.8–46.1)
HCV AB SER QL: NORMAL
HGB BLD-MCNC: 11.1 G/DL (ref 11.5–15.4)
IMM GRANULOCYTES # BLD AUTO: 0.02 THOUSAND/UL (ref 0–0.2)
IMM GRANULOCYTES NFR BLD AUTO: 0 % (ref 0–2)
LYMPHOCYTES # BLD AUTO: 1.16 THOUSANDS/ΜL (ref 0.6–4.47)
LYMPHOCYTES NFR BLD AUTO: 17 % (ref 14–44)
MCH RBC QN AUTO: 27.3 PG (ref 26.8–34.3)
MCHC RBC AUTO-ENTMCNC: 31.7 G/DL (ref 31.4–37.4)
MCV RBC AUTO: 86 FL (ref 82–98)
MONOCYTES # BLD AUTO: 0.23 THOUSAND/ΜL (ref 0.17–1.22)
MONOCYTES NFR BLD AUTO: 3 % (ref 4–12)
NEUTROPHILS # BLD AUTO: 5.28 THOUSANDS/ΜL (ref 1.85–7.62)
NEUTS SEG NFR BLD AUTO: 79 % (ref 43–75)
NRBC BLD AUTO-RTO: 0 /100 WBCS
PLATELET # BLD AUTO: 253 THOUSANDS/UL (ref 149–390)
PMV BLD AUTO: 9.5 FL (ref 8.9–12.7)
POTASSIUM SERPL-SCNC: 3.7 MMOL/L (ref 3.5–5.3)
PROT SERPL-MCNC: 7 G/DL (ref 6.4–8.2)
PROT UR-MCNC: 21 MG/DL
PROT/CREAT UR: 0.09 MG/G{CREAT} (ref 0–0.1)
RBC # BLD AUTO: 4.07 MILLION/UL (ref 3.81–5.12)
RH BLD: POSITIVE
RPR SER QL: NORMAL
RUBV IGG SERPL IA-ACNC: 23.5 IU/ML
SODIUM SERPL-SCNC: 136 MMOL/L (ref 136–145)
SPECIMEN EXPIRATION DATE: NORMAL
WBC # BLD AUTO: 6.78 THOUSAND/UL (ref 4.31–10.16)

## 2022-02-01 PROCEDURE — 99213 OFFICE O/P EST LOW 20 MIN: CPT | Performed by: OBSTETRICS & GYNECOLOGY

## 2022-02-01 PROCEDURE — 87186 SC STD MICRODIL/AGAR DIL: CPT

## 2022-02-01 PROCEDURE — 82570 ASSAY OF URINE CREATININE: CPT

## 2022-02-01 PROCEDURE — 86803 HEPATITIS C AB TEST: CPT

## 2022-02-01 PROCEDURE — 82950 GLUCOSE TEST: CPT

## 2022-02-01 PROCEDURE — 76813 OB US NUCHAL MEAS 1 GEST: CPT | Performed by: OBSTETRICS & GYNECOLOGY

## 2022-02-01 PROCEDURE — 84156 ASSAY OF PROTEIN URINE: CPT

## 2022-02-01 PROCEDURE — 87077 CULTURE AEROBIC IDENTIFY: CPT

## 2022-02-01 PROCEDURE — 80081 OBSTETRIC PANEL INC HIV TSTG: CPT

## 2022-02-01 PROCEDURE — 36415 COLL VENOUS BLD VENIPUNCTURE: CPT

## 2022-02-01 PROCEDURE — 80053 COMPREHEN METABOLIC PANEL: CPT

## 2022-02-01 PROCEDURE — 86787 VARICELLA-ZOSTER ANTIBODY: CPT

## 2022-02-01 PROCEDURE — 87086 URINE CULTURE/COLONY COUNT: CPT

## 2022-02-01 RX ORDER — ASPIRIN 81 MG/1
162 TABLET ORAL DAILY
Qty: 60 TABLET | Refills: 2 | Status: SHIPPED | OUTPATIENT
Start: 2022-02-01 | End: 2022-07-15

## 2022-02-01 NOTE — LETTER
2022     [address], [place]  [address] [id number] [facility]    Patient: Gris Cardoza   YOB: 2000   Date of Visit: 2022       Dear Dr Karo Oliveira: Thank you for referring Gris Cardoza to me for evaluation  Below are my notes for this consultation  If you have questions, please do not hesitate to call me  I look forward to following your patient along with you  Sincerely,         [place] [address] [place]        CC: No Recipients  Tg Romero MD  2022  7:24 PM  Incomplete  A fetal ultrasound was completed  See Ob procedures in Epic for an interpretation and recommendations  Do not hesitate to contact us in [facility] if you have questions  Shruthi Louis MD, [address] [place] [address]  Maternal Fetal Medicine

## 2022-02-02 LAB — HIV 1+2 AB+HIV1 P24 AG SERPL QL IA: NORMAL

## 2022-02-03 DIAGNOSIS — O23.11 ACUTE CYSTITIS DURING PREGNANCY IN FIRST TRIMESTER: Primary | ICD-10-CM

## 2022-02-03 LAB — VZV IGG SER IA-ACNC: ABNORMAL

## 2022-02-03 RX ORDER — NITROFURANTOIN 25; 75 MG/1; MG/1
100 CAPSULE ORAL 2 TIMES DAILY
Qty: 14 CAPSULE | Refills: 0 | Status: SHIPPED | OUTPATIENT
Start: 2022-02-03 | End: 2022-02-10

## 2022-02-04 LAB
BACTERIA UR CULT: ABNORMAL
BACTERIA UR CULT: ABNORMAL

## 2022-02-07 PROBLEM — O99.211 OBESITY AFFECTING PREGNANCY IN FIRST TRIMESTER: Status: ACTIVE | Noted: 2022-02-07

## 2022-02-07 PROBLEM — Z3A.13 13 WEEKS GESTATION OF PREGNANCY: Status: ACTIVE | Noted: 2022-02-07

## 2022-02-08 NOTE — PROGRESS NOTES
A fetal ultrasound was completed  See Ob procedures in Epic for an interpretation and recommendations  Do not hesitate to contact us in [facility] if you have questions  Leonarda Rdz MD, [address] [facility]  Maternal Fetal Medicine

## 2022-02-11 PROBLEM — O10.919 CHRONIC HYPERTENSION AFFECTING PREGNANCY: Status: ACTIVE | Noted: 2022-02-11

## 2022-02-15 ENCOUNTER — ROUTINE PRENATAL (OUTPATIENT)
Dept: OBGYN CLINIC | Facility: CLINIC | Age: 22
End: 2022-02-15

## 2022-02-15 VITALS — WEIGHT: 293 LBS | BODY MASS INDEX: 47.21 KG/M2 | SYSTOLIC BLOOD PRESSURE: 136 MMHG | DIASTOLIC BLOOD PRESSURE: 78 MMHG

## 2022-02-15 DIAGNOSIS — Z3A.15 15 WEEKS GESTATION OF PREGNANCY: ICD-10-CM

## 2022-02-15 DIAGNOSIS — Z11.3 SCREENING FOR STD (SEXUALLY TRANSMITTED DISEASE): ICD-10-CM

## 2022-02-15 DIAGNOSIS — O10.919 CHRONIC HYPERTENSION AFFECTING PREGNANCY: ICD-10-CM

## 2022-02-15 DIAGNOSIS — O99.212 OBESITY AFFECTING PREGNANCY IN SECOND TRIMESTER: Primary | ICD-10-CM

## 2022-02-15 DIAGNOSIS — O09.892 SHORT INTERVAL BETWEEN PREGNANCIES AFFECTING PREGNANCY IN SECOND TRIMESTER, ANTEPARTUM: ICD-10-CM

## 2022-02-15 PROCEDURE — PNV: Performed by: PHYSICIAN ASSISTANT

## 2022-02-15 PROCEDURE — 87491 CHLMYD TRACH DNA AMP PROBE: CPT | Performed by: PHYSICIAN ASSISTANT

## 2022-02-15 PROCEDURE — 87591 N.GONORRHOEAE DNA AMP PROB: CPT | Performed by: PHYSICIAN ASSISTANT

## 2022-02-15 NOTE — PROGRESS NOTES
Patient is a 22 YO  female presenting to the office at 15 1 weeks for routine OB care  BP: 136/78  TWG: -2lb  Fetal Movement: none felt yet  Feeling well today  Denies LOF, CTX, VB, HA, blurry vision  Saw MFM, normal NT, declined genetic testing  MSAFP ordered and patient aware of timing of test  20 week anatomy scan scheduled  GC cultures collected today   PAP NILM ()  Failed 1 hr glucola, pt monitoring sugars  FBS <90 (had one 94, all others <90), 1 hr PP all <140  Recommend patient check sugars at least 1x/week  Will need to repeat again at 28 weeks  OK to transfuse and code  Patient to call for concerns  RTO 4 weeks

## 2022-02-17 ENCOUNTER — NURSE TRIAGE (OUTPATIENT)
Dept: OTHER | Facility: OTHER | Age: 22
End: 2022-02-17

## 2022-02-17 LAB
C TRACH DNA SPEC QL NAA+PROBE: NEGATIVE
N GONORRHOEA DNA SPEC QL NAA+PROBE: NEGATIVE

## 2022-02-18 ENCOUNTER — CLINICAL SUPPORT (OUTPATIENT)
Dept: OBGYN CLINIC | Facility: CLINIC | Age: 22
End: 2022-02-18

## 2022-02-18 VITALS — DIASTOLIC BLOOD PRESSURE: 76 MMHG | SYSTOLIC BLOOD PRESSURE: 134 MMHG

## 2022-02-18 DIAGNOSIS — O26.892 PREGNANCY HEADACHE IN SECOND TRIMESTER: Primary | ICD-10-CM

## 2022-02-18 DIAGNOSIS — R51.9 PREGNANCY HEADACHE IN SECOND TRIMESTER: Primary | ICD-10-CM

## 2022-02-18 RX ORDER — BUTALBITAL, ACETAMINOPHEN AND CAFFEINE 300; 40; 50 MG/1; MG/1; MG/1
1 CAPSULE ORAL EVERY 6 HOURS PRN
Qty: 10 CAPSULE | Refills: 0 | Status: SHIPPED | OUTPATIENT
Start: 2022-02-18 | End: 2022-07-21

## 2022-02-18 NOTE — PROGRESS NOTES
Pt  Here for BP check  Has been experiencing increased headaches x2 days unrelieved by tylenol prompting pt  To begin checking Bps more frequently at home  Reading at home 171/99 repeat 1 hour later 148/99  Still having headache  Declines vision changes, declines RUQ pain, or swelling  BP: 134/76    Recommended headache management: increase hydration, tylenol PRN, discussed adding daily magnesium and riboflavin supplements to routine to help avoid  Dr Claudio Arce will send in fioricet to use sparingly PRN  Pt  Verbalized understanding

## 2022-02-18 NOTE — TELEPHONE ENCOUNTER
Reason for Disposition   [9] Systolic BP  >= 394 OR Diastolic >= 80 AND [5] pregnant    Answer Assessment - Initial Assessment Questions  1  BLOOD PRESSURE: "What is the blood pressure?" "Did you take at least two measurements 5 minutes apart?"      148/99 at 7:40pm, was 171/99 at 6:40pm    2  ONSET: "When did you take your blood pressure?"      As noted above    3  HOW: "How did you obtain the blood pressure?" (e g , visiting nurse, automatic home BP monitor)      Automatic home BP monitor    4  HISTORY: "Do you have a history of high blood pressure?"      Yes    5  MEDICATIONS: "Are you taking any medications for blood pressure?" "Have you missed any doses recently?"      Takes labetolol, has not missed any doses  6  OTHER SYMPTOMS: "Do you have any symptoms?" (e g , headache, chest pain, blurred vision, difficulty breathing, weakness)      HA, dizzy at times    7   PREGNANCY: "Is there any chance you are pregnant?" "When was your last menstrual period?"      15w3d pregnant    Protocols used: HIGH BLOOD PRESSURE-ADULT-

## 2022-02-22 ENCOUNTER — TELEMEDICINE (OUTPATIENT)
Dept: FAMILY MEDICINE CLINIC | Facility: CLINIC | Age: 22
End: 2022-02-22
Payer: OTHER GOVERNMENT

## 2022-02-22 ENCOUNTER — TELEPHONE (OUTPATIENT)
Dept: FAMILY MEDICINE CLINIC | Facility: CLINIC | Age: 22
End: 2022-02-22

## 2022-02-22 ENCOUNTER — TELEPHONE (OUTPATIENT)
Dept: OBGYN CLINIC | Facility: CLINIC | Age: 22
End: 2022-02-22

## 2022-02-22 DIAGNOSIS — B34.9 VIRAL SYNDROME: Primary | ICD-10-CM

## 2022-02-22 DIAGNOSIS — Z3A.16 16 WEEKS GESTATION OF PREGNANCY: ICD-10-CM

## 2022-02-22 PROCEDURE — 99213 OFFICE O/P EST LOW 20 MIN: CPT | Performed by: FAMILY MEDICINE

## 2022-02-22 RX ORDER — AZITHROMYCIN 250 MG/1
TABLET, FILM COATED ORAL
Qty: 6 TABLET | Refills: 0 | Status: SHIPPED | OUTPATIENT
Start: 2022-02-22 | End: 2022-02-28

## 2022-02-22 RX ORDER — METHYLPREDNISOLONE 4 MG/1
TABLET ORAL
Qty: 21 EACH | Refills: 0 | Status: SHIPPED | OUTPATIENT
Start: 2022-02-22 | End: 2022-06-17

## 2022-02-22 NOTE — TELEPHONE ENCOUNTER
Patient called with c/o extreme coughing, sinus pressure and runny nose  She has tried warm showers/vaporub/cough drops/Robatusin  She is struggling with coughing fits that lead to heaving and vomiting  She is asking if something can be sent to her pharmacy

## 2022-02-22 NOTE — PROGRESS NOTES
Virtual Regular Visit    Verification of patient location:    Patient is located in the following state in which I hold an active license [place]      Assessment/Plan:    Problem List Items Addressed This Visit     None      Visit Diagnoses     Viral syndrome    -  Primary    Relevant Medications    methylPREDNISolone 4 MG tablet therapy pack    azithromycin (ZITHROMAX) 250 mg tablet    16 weeks gestation of pregnancy          Patient likely with viral syndrome with a high risk of turning into bronchitis  Recommend that the patient start a Medrol Julio if there is no improvement then will start a Z-Julio shortly after  Advised the patient that I want her to continue simple Mucinex  Recommend Unisom to help sleep    Notified her primary OB who agreed with plan       Reason for visit is   Chief Complaint   Patient presents with    Virtual Regular Visit        Encounter provider Miriam Hendrix MD    Provider located at [address] [place] [id number]      Recent Visits  No visits were found meeting these conditions  Showing recent visits within past 7 days and meeting all other requirements  Today's Visits  Date Type Provider Dept   02/22/22 Telemedicine Miriam Hendrix MD [facility]   02/22/22 Telephone [address] today's visits and meeting all other requirements  Future Appointments  No visits were found meeting these conditions  Showing future appointments within next 150 days and meeting all other requirements       The patient was identified by name and date of birth  Sarah Lindaeliana was informed that this is a telemedicine visit and that the visit is being conducted through [facility] and patient was informed that this is not a secure, HIPAA-compliant platform  She agrees to proceed     My office door was closed  No one else was in the room    She acknowledged consent and understanding of privacy and security of the video platform  The patient has agreed to participate and understands they can discontinue the visit at any time  Patient is aware this is a billable service  Subjective  Roberto Seth is a 23 y o  female presents via video    Symptoms started 3 days ago-> significant cough specifically at nighttime  Patient states that she has no fevers or chills  She got nervous and took a COVID test this morning which was negative  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    Her son currently is not sick  16 weeks pregnant-> Doing well  BP slightly elevated-> Still taking Labetalol 200mg BID  Rapid Negative COVID this AM      Past Medical History:   Diagnosis Date    Anemia     with pregnancy , receive iron infusions    Anxiety     Chronic hip pain, left     Chronic hypertension with superimposed preeclampsia 7994    Complication of anesthesia     pt reports required" more anesthesia "for her oral surgery and hand surgery to be effective    Congenital pes planus of both feet     Resolved      COVID-19     with pregnancy around 2020    Difficulty walking     Resolved 2017 , secondary to hip left    Eczema     Hypertension     chronic HTN     Mass of hand, left     Resolved 2017 , benign     Obesity 2016    Pain     chronic back pain     (spontaneous vaginal delivery) 2021    Varicella     vaccine    Visual impairment     eyewear       Past Surgical History:   Procedure Laterality Date    FL INJECTION LEFT HIP (ARTHROGRAM)  2018    HAND SURGERY Left     WISDOM TOOTH EXTRACTION         Current Outpatient Medications   Medication Sig Dispense Refill    aspirin (ECOTRIN LOW STRENGTH) 81 mg EC tablet Take 2 tablets (162 mg total) by mouth daily 60 tablet 2    azithromycin (ZITHROMAX) 250 mg tablet Take 2 tablets today then 1 tablet daily x 4 days 6 tablet 0    Butalbital-APAP-Caffeine (Fioricet) -40 MG CAPS Take 1 tablet by mouth every 6 (six) hours as needed (headache) 10 capsule 0    labetalol (NORMODYNE) 200 mg tablet Take 1 tablet (200 mg total) by mouth 2 (two) times a day 180 tablet 1    methylPREDNISolone 4 MG tablet therapy pack Use as directed on package 21 each 0    Prenatal MV & Min w/FA-DHA (Prenatal Adult Gummy/DHA/FA) 0 4-25 MG CHEW Chew 2 tablets daily       No current facility-administered medications for this visit  No Known Allergies    Review of Systems   Constitutional: Positive for fatigue  Negative for activity change, appetite change, chills and fever  HENT: Negative for congestion  Respiratory: Positive for cough  Negative for chest tightness and shortness of breath  Cardiovascular: Negative for chest pain and leg swelling  Gastrointestinal: Negative for abdominal distention, abdominal pain, constipation, diarrhea, nausea and vomiting  Psychiatric/Behavioral: Positive for sleep disturbance  All other systems reviewed and are negative  Video Exam    There were no vitals filed for this visit  Physical Exam  Constitutional:       Appearance: Normal appearance  Pulmonary:      Effort: Pulmonary effort is normal    Musculoskeletal:         General: Normal range of motion  Neurological:      General: No focal deficit present  Mental Status: She is alert and oriented to person, place, and time  Psychiatric:         Mood and Affect: Mood normal          Behavior: Behavior normal          Thought Content: Thought content normal          Judgment: Judgment normal           I spent 15 minutes directly with the patient during this visit    VIRTUAL VISIT [facility] verbally agrees to participate in [employer]   Pt is aware that [employer] could be limited without vital signs or the ability to perform a full hands-on physical Conrad Soriano understands she or the provider may request at any time to terminate the video visit and request the patient to seek care or treatment in person

## 2022-03-10 ENCOUNTER — TELEPHONE (OUTPATIENT)
Dept: OBGYN CLINIC | Facility: CLINIC | Age: 22
End: 2022-03-10

## 2022-03-10 NOTE — TELEPHONE ENCOUNTER
Pt  Calling in with c/o RUQ pain and slight cramping    Pt  Has hx of cHTN with recent BP about 10 minutes ago 151/91    Declines n/v/ha, vision changes, chest pain, SOB, palpitations    States nothing makes the pain better or worse  Provider advised RUQ may be r/t gallbladder  Advised tylenol for cramping with increased hydration  Advised bland diet avoiding spicy, fried, fatty foods  Can try famotidine 20mg  Pt  Verbalized understanding

## 2022-03-22 ENCOUNTER — ROUTINE PRENATAL (OUTPATIENT)
Dept: PERINATAL CARE | Facility: OTHER | Age: 22
End: 2022-03-22
Payer: OTHER GOVERNMENT

## 2022-03-22 ENCOUNTER — ROUTINE PRENATAL (OUTPATIENT)
Dept: OBGYN CLINIC | Facility: CLINIC | Age: 22
End: 2022-03-22

## 2022-03-22 VITALS
BODY MASS INDEX: 41.95 KG/M2 | HEART RATE: 88 BPM | WEIGHT: 293 LBS | DIASTOLIC BLOOD PRESSURE: 87 MMHG | SYSTOLIC BLOOD PRESSURE: 140 MMHG | HEIGHT: 70 IN

## 2022-03-22 VITALS — DIASTOLIC BLOOD PRESSURE: 82 MMHG | WEIGHT: 293 LBS | BODY MASS INDEX: 47.64 KG/M2 | SYSTOLIC BLOOD PRESSURE: 134 MMHG

## 2022-03-22 DIAGNOSIS — O10.919 CHRONIC HYPERTENSION AFFECTING PREGNANCY: Primary | ICD-10-CM

## 2022-03-22 DIAGNOSIS — O99.212 OBESITY AFFECTING PREGNANCY IN SECOND TRIMESTER: ICD-10-CM

## 2022-03-22 DIAGNOSIS — Z36.86 ENCOUNTER FOR ANTENATAL SCREENING FOR CERVICAL LENGTH: ICD-10-CM

## 2022-03-22 DIAGNOSIS — Z3A.20 20 WEEKS GESTATION OF PREGNANCY: ICD-10-CM

## 2022-03-22 PROBLEM — Z3A.13 13 WEEKS GESTATION OF PREGNANCY: Status: RESOLVED | Noted: 2022-02-07 | Resolved: 2022-03-22

## 2022-03-22 PROCEDURE — 76811 OB US DETAILED SNGL FETUS: CPT | Performed by: OBSTETRICS & GYNECOLOGY

## 2022-03-22 PROCEDURE — 76817 TRANSVAGINAL US OBSTETRIC: CPT | Performed by: OBSTETRICS & GYNECOLOGY

## 2022-03-22 PROCEDURE — 99213 OFFICE O/P EST LOW 20 MIN: CPT | Performed by: OBSTETRICS & GYNECOLOGY

## 2022-03-22 PROCEDURE — PNV: Performed by: OBSTETRICS & GYNECOLOGY

## 2022-03-22 NOTE — PROGRESS NOTES
23 y o  Edelmira Placentia female at 25w1d (Estimated Date of Delivery: 22) for PNV  Pre-Trevor Vitals      Most Recent Value   Prenatal Assessment    Movement Present   Prenatal Vitals    Blood Pressure 134/82   Weight - Scale 151 kg (332 lb)   Urine Albumin/Glucose    Dilation/Effacement/Station    Vaginal Drainage    Edema          kg (1 lb)  Reviewed level II u/s, missed anatomy scheduled  cHTN taking labetalol 200mg bid, states bp at home overall wnl  Has had a random elevation, but normalizes on repeat  Advised if she has 2 >160/110 to call  Reviewed pre-E signs/ symptoms to call for  Taking LDASA for pre-E precaution

## 2022-03-22 NOTE — PROGRESS NOTES
The patient was seen today for an ultrasound  Please see ultrasound report (located under Ob Procedures) for additional details  Thank you very much for allowing us to participate in the care of this very nice patient  Should you have any questions, please do not hesitate to contact me  Raza Bernardo MD [address]  Attending Physician, Juliana

## 2022-04-21 ENCOUNTER — ROUTINE PRENATAL (OUTPATIENT)
Dept: OBGYN CLINIC | Facility: CLINIC | Age: 22
End: 2022-04-21

## 2022-04-21 VITALS — WEIGHT: 293 LBS | DIASTOLIC BLOOD PRESSURE: 78 MMHG | SYSTOLIC BLOOD PRESSURE: 130 MMHG | BODY MASS INDEX: 47.92 KG/M2

## 2022-04-21 DIAGNOSIS — O99.212 OBESITY AFFECTING PREGNANCY IN SECOND TRIMESTER: ICD-10-CM

## 2022-04-21 DIAGNOSIS — O10.919 CHRONIC HYPERTENSION AFFECTING PREGNANCY: Primary | ICD-10-CM

## 2022-04-21 DIAGNOSIS — Z3A.24 24 WEEKS GESTATION OF PREGNANCY: ICD-10-CM

## 2022-04-21 DIAGNOSIS — Z3A.20 20 WEEKS GESTATION OF PREGNANCY: ICD-10-CM

## 2022-04-21 PROCEDURE — PNV: Performed by: OBSTETRICS & GYNECOLOGY

## 2022-04-21 RX ORDER — DIPHENHYDRAMINE HYDROCHLORIDE 25 MG/1
TABLET, CHEWABLE ORAL
COMMUNITY
End: 2022-07-21

## 2022-04-21 NOTE — PROGRESS NOTES
23 y o    female at 20 3 wga EGA for PNV  BP : 130/78   TWG: 3  CHTN - continue labetalol 200 mg b i d   Gave 28 week labs, will do Accu-Cheks and 2 hour post prandials for 1 week instead of a Glucola  Reviewed  labor precautions  Growth at 32 weeks, antepartum surveillance at 32 weeks  Follow-up in 4 weeks

## 2022-04-21 NOTE — PROGRESS NOTES
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

## 2022-05-05 ENCOUNTER — ULTRASOUND (OUTPATIENT)
Dept: PERINATAL CARE | Facility: OTHER | Age: 22
End: 2022-05-05
Payer: OTHER GOVERNMENT

## 2022-05-05 VITALS
WEIGHT: 293 LBS | DIASTOLIC BLOOD PRESSURE: 87 MMHG | HEART RATE: 101 BPM | SYSTOLIC BLOOD PRESSURE: 139 MMHG | HEIGHT: 70 IN | BODY MASS INDEX: 41.95 KG/M2

## 2022-05-05 DIAGNOSIS — Z3A.26 26 WEEKS GESTATION OF PREGNANCY: ICD-10-CM

## 2022-05-05 DIAGNOSIS — O10.919 CHRONIC HYPERTENSION AFFECTING PREGNANCY: Primary | ICD-10-CM

## 2022-05-05 PROCEDURE — 99213 OFFICE O/P EST LOW 20 MIN: CPT | Performed by: OBSTETRICS & GYNECOLOGY

## 2022-05-05 PROCEDURE — 76816 OB US FOLLOW-UP PER FETUS: CPT | Performed by: OBSTETRICS & GYNECOLOGY

## 2022-05-05 NOTE — PROGRESS NOTES
The patient was seen today for an ultrasound  Please see ultrasound report (located under Ob Procedures) for additional details  Thank you very much for allowing us to participate in the care of this very nice patient  Should you have any questions, please do not hesitate to contact me  Raza Robert MD [facility]  Attending Physician, Juliana

## 2022-05-20 ENCOUNTER — APPOINTMENT (OUTPATIENT)
Dept: LAB | Facility: AMBULARY SURGERY CENTER | Age: 22
End: 2022-05-20
Attending: OBSTETRICS & GYNECOLOGY
Payer: OTHER GOVERNMENT

## 2022-05-20 ENCOUNTER — ROUTINE PRENATAL (OUTPATIENT)
Dept: OBGYN CLINIC | Facility: CLINIC | Age: 22
End: 2022-05-20
Payer: OTHER GOVERNMENT

## 2022-05-20 VITALS — BODY MASS INDEX: 47.64 KG/M2 | DIASTOLIC BLOOD PRESSURE: 76 MMHG | WEIGHT: 293 LBS | SYSTOLIC BLOOD PRESSURE: 130 MMHG

## 2022-05-20 DIAGNOSIS — Z3A.28 28 WEEKS GESTATION OF PREGNANCY: ICD-10-CM

## 2022-05-20 DIAGNOSIS — Z23 NEED FOR TDAP VACCINATION: ICD-10-CM

## 2022-05-20 DIAGNOSIS — O10.919 CHRONIC HYPERTENSION AFFECTING PREGNANCY: Primary | ICD-10-CM

## 2022-05-20 DIAGNOSIS — Z34.83 ENCOUNTER FOR SUPERVISION OF OTHER NORMAL PREGNANCY, THIRD TRIMESTER: ICD-10-CM

## 2022-05-20 DIAGNOSIS — Z3A.24 24 WEEKS GESTATION OF PREGNANCY: ICD-10-CM

## 2022-05-20 LAB
ERYTHROCYTE [DISTWIDTH] IN BLOOD BY AUTOMATED COUNT: 13.2 % (ref 11.6–15.1)
HCT VFR BLD AUTO: 31.8 % (ref 34.8–46.1)
HGB BLD-MCNC: 10.4 G/DL (ref 11.5–15.4)
MCH RBC QN AUTO: 28 PG (ref 26.8–34.3)
MCHC RBC AUTO-ENTMCNC: 32.7 G/DL (ref 31.4–37.4)
MCV RBC AUTO: 86 FL (ref 82–98)
PLATELET # BLD AUTO: 228 THOUSANDS/UL (ref 149–390)
PMV BLD AUTO: 10.2 FL (ref 8.9–12.7)
RBC # BLD AUTO: 3.72 MILLION/UL (ref 3.81–5.12)
WBC # BLD AUTO: 6.67 THOUSAND/UL (ref 4.31–10.16)

## 2022-05-20 PROCEDURE — 36415 COLL VENOUS BLD VENIPUNCTURE: CPT

## 2022-05-20 PROCEDURE — 85027 COMPLETE CBC AUTOMATED: CPT

## 2022-05-20 PROCEDURE — 90471 IMMUNIZATION ADMIN: CPT | Performed by: OBSTETRICS & GYNECOLOGY

## 2022-05-20 PROCEDURE — 86592 SYPHILIS TEST NON-TREP QUAL: CPT

## 2022-05-20 PROCEDURE — 90715 TDAP VACCINE 7 YRS/> IM: CPT | Performed by: OBSTETRICS & GYNECOLOGY

## 2022-05-20 PROCEDURE — PNV: Performed by: OBSTETRICS & GYNECOLOGY

## 2022-05-20 NOTE — PROGRESS NOTES
Red folder done today  TDAP offered and accepted  TDAP given in right deltoid without difficulty, patient tolerated shot well  Breast pump submitted  Patient has no concerns at this time  Urine dip positive trace protein/negative glucose

## 2022-05-20 NOTE — PROGRESS NOTES
23 y o   female at 33w3d (Estimated Date of Delivery: 22) for PNV  Pre- Vitals    Flowsheet Row Most Recent Value   Prenatal Assessment    Movement Present   Prenatal Vitals    Blood Pressure 130/76   Weight - Scale 151 kg (332 lb)   Urine Albumin/Glucose    Dilation/Effacement/Station    Vaginal Drainage    Edema    LLE Edema None   RLE Edema None   Facial Edema None         kg (1 lb)  This is a 23 y o   at 28w4d who presents for return OB visit  No complaints  Denies contractions, leakage, bleeding  Endorses fetal movement  28 wk labs reviewed  TDAP offered   [address] reviewed  Consent signed  Full code  OK with blood transfusion     cHTN- on labetalol 200mg PO bid  Discussed BP range ideal <140/90  To call with BP >160/110  Following with MFM  Plan for delivery 37-38 IOL

## 2022-05-23 LAB — RPR SER QL: NORMAL

## 2022-06-03 ENCOUNTER — ROUTINE PRENATAL (OUTPATIENT)
Dept: OBGYN CLINIC | Facility: CLINIC | Age: 22
End: 2022-06-03

## 2022-06-03 VITALS — SYSTOLIC BLOOD PRESSURE: 122 MMHG | BODY MASS INDEX: 46.78 KG/M2 | DIASTOLIC BLOOD PRESSURE: 82 MMHG | WEIGHT: 293 LBS

## 2022-06-03 DIAGNOSIS — Z3A.30 30 WEEKS GESTATION OF PREGNANCY: ICD-10-CM

## 2022-06-03 DIAGNOSIS — O99.212 OBESITY AFFECTING PREGNANCY IN SECOND TRIMESTER: ICD-10-CM

## 2022-06-03 DIAGNOSIS — O10.919 CHRONIC HYPERTENSION AFFECTING PREGNANCY: Primary | ICD-10-CM

## 2022-06-03 PROCEDURE — PNV: Performed by: OBSTETRICS & GYNECOLOGY

## 2022-06-03 RX ORDER — LORATADINE 10 MG/1
10 TABLET ORAL DAILY
COMMUNITY

## 2022-06-03 NOTE — PROGRESS NOTES
23 y o   female at 30w3d (Estimated Date of Delivery: 22) for PNV  Pre- Vitals    Flowsheet Row Most Recent Value   Prenatal Assessment    Movement Present   Prenatal Vitals    Blood Pressure 122/82   Weight - Scale 148 kg (326 lb)   Urine Albumin/Glucose    Dilation/Effacement/Station    Vaginal Drainage    Edema    LLE Edema Trace   RLE Edema Trace   Facial Edema None        TWG: -2 268 kg (-5 lb)  Denies contractions/lof or bleeding  Good FM   Denies HA/blurry vision/n/v  RTO in 2 weeks, discussed NST schedule

## 2022-06-06 ENCOUNTER — OFFICE VISIT (OUTPATIENT)
Dept: URGENT CARE | Facility: MEDICAL CENTER | Age: 22
End: 2022-06-06
Payer: OTHER GOVERNMENT

## 2022-06-06 ENCOUNTER — APPOINTMENT (OUTPATIENT)
Dept: RADIOLOGY | Facility: MEDICAL CENTER | Age: 22
End: 2022-06-06
Payer: OTHER GOVERNMENT

## 2022-06-06 VITALS
HEART RATE: 91 BPM | HEIGHT: 70 IN | TEMPERATURE: 98.1 F | SYSTOLIC BLOOD PRESSURE: 147 MMHG | RESPIRATION RATE: 18 BRPM | OXYGEN SATURATION: 99 % | BODY MASS INDEX: 41.95 KG/M2 | WEIGHT: 293 LBS | DIASTOLIC BLOOD PRESSURE: 88 MMHG

## 2022-06-06 DIAGNOSIS — S93.601A SPRAIN OF RIGHT FOOT, INITIAL ENCOUNTER: ICD-10-CM

## 2022-06-06 DIAGNOSIS — S93.601A SPRAIN OF RIGHT FOOT, INITIAL ENCOUNTER: Primary | ICD-10-CM

## 2022-06-06 PROCEDURE — G0382 LEV 3 HOSP TYPE B ED VISIT: HCPCS | Performed by: PHYSICIAN ASSISTANT

## 2022-06-06 PROCEDURE — 73630 X-RAY EXAM OF FOOT: CPT

## 2022-06-06 NOTE — PATIENT INSTRUCTIONS
Sprain foot  Rest, ice, elevate  Follow up with PCP in 3-5 days  Proceed to  ER if symptoms worsen  Foot Sprain   WHAT YOU NEED TO KNOW:   A foot sprain is a stretched or torn ligament in the foot or toe  Ligaments are tough tissues that connect bones  DISCHARGE INSTRUCTIONS:   Return to the emergency department if:   You have numbness or tingling below the injury, such as in your toes  The skin on your injured foot is blue or pale  You have increased pain, even after you take pain medicine  Call your doctor if:   You have new weakness in your foot  You have new or increased swelling in your foot  You have new or increased stiffness when you move your injured foot  You have questions or concerns about your condition or care  Medicines:   NSAIDs , such as ibuprofen, help decrease swelling, pain, and fever  This medicine is available with or without a doctor's order  NSAIDs can cause stomach bleeding or kidney problems in certain people  If you take blood thinner medicine, always ask if NSAIDs are safe for you  Always read the medicine label and follow directions  Do not give these medicines to children under 10months of age without direction from your child's healthcare provider  Take your medicine as directed  Contact your healthcare provider if you think your medicine is not helping or if you have side effects  Tell him of her if you are allergic to any medicine  Keep a list of the medicines, vitamins, and herbs you take  Include the amounts, and when and why you take them  Bring the list or the pill bottles to follow-up visits  Carry your medicine list with you in case of an emergency  Self-care:   Rest your foot  Limit movement in your sprained foot for the first 2 to 3 days  You might need crutches to take weight off your injured foot as it heals  Use crutches as directed  Apply ice  on your foot for 15 to 20 minutes every hour or as directed   Use an ice pack, or put crushed ice in a plastic bag  Cover it with a towel  Ice helps prevent tissue damage and decreases swelling and pain  Compress your foot  You may need to use tape or an elastic bandage to support your foot if you have a mild sprain  You may need a splint on your foot for support if your sprain is severe  Wear your splint for as many days as directed  Elevate your foot  above the level of your heart as often as you can  This will help decrease swelling and pain  Prop your foot on pillows or blankets to keep it elevated comfortably  Exercise your foot:  You may be given exercises to improve your strength and to help decrease stiffness  The exercises and physical therapy can help restore strength and increase the range of motion in your foot  Ask your healthcare provider when you can return to your normal activities or play sports  Prevent another foot sprain:   Warm up and stretch before you exercise  Do not exercise when you feel pain or are tired  Wear equipment to protect yourself when you play sports  Follow up with your doctor as directed:  Write down your questions so you remember to ask them during your visits  © Copyright [employer] 2022 Information is for End User's use only and may not be sold, redistributed or otherwise used for commercial purposes  All illustrations and images included in CareNotes® are the copyrighted property of [employer]  or [facility] David Lozada   The above information is an  only  It is not intended as medical advice for individual conditions or treatments  Talk to your doctor, nurse or pharmacist before following any medical regimen to see if it is safe and effective for you

## 2022-06-06 NOTE — PROGRESS NOTES
[facility]        NAME: Naman Costa is a 23 y o  female  : 2000    MRN: [id number]  DATE: 2022  TIME: 2:35 PM    Assessment and Plan   Sprain of right foot, initial encounter [S27 [id number]]  1  Sprain of right foot, initial encounter           Patient Instructions     Sprain foot  Rest, ice, elevate  Follow up with PCP in 3-5 days  Proceed to  ER if symptoms worsen  Chief Complaint     Chief Complaint   Patient presents with    Foot Pain     Unknown injury  Pt states after gardening yesterday she started feeling pain on the right foot that has worsened, trouble bearing weight  Swelling, mild bruising  History of Present Illness       78-year-old female who is 31 week pregnant presents complaining of right foot pain  Patient states she was walking around yesterday when she developed pain does not recall history of trauma  Review of Systems   Review of Systems   Constitutional: Negative  HENT: Negative  Eyes: Negative  Respiratory: Negative  Negative for cough, chest tightness, shortness of breath, wheezing and stridor  Cardiovascular: Negative  Negative for chest pain, palpitations and leg swelling  Musculoskeletal: Positive for arthralgias           Current Medications       Current Outpatient Medications:     aspirin (ECOTRIN LOW STRENGTH) 81 mg EC tablet, Take 2 tablets (162 mg total) by mouth daily, Disp: 60 tablet, Rfl: 2    Butalbital-APAP-Caffeine (Fioricet) -40 MG CAPS, Take 1 tablet by mouth every 6 (six) hours as needed (headache), Disp: 10 capsule, Rfl: 0    diphenhydrAMINE HCl, Sleep, (Unisom SleepMelts) 25 MG TBDP, Take by mouth, Disp: , Rfl:     labetalol (NORMODYNE) 200 mg tablet, Take 1 tablet (200 mg total) by mouth 2 (two) times a day, Disp: 180 tablet, Rfl: 1    loratadine (CLARITIN) 10 mg tablet, Take 10 mg by mouth daily, Disp: , Rfl:     Prenatal MV & Min w/FA-DHA (Prenatal Adult Gummy/DHA/FA) 0 4-25 MG CHEW, Chew 2 tablets daily, Disp: , Rfl:     methylPREDNISolone 4 MG tablet therapy pack, Use as directed on package (Patient not taking: No sig reported), Disp: 21 each, Rfl: 0    Current Allergies     Allergies as of 2022    (No Known Allergies)            The following portions of the patient's history were reviewed and updated as appropriate: allergies, current medications, past family history, past medical history, past social history, past surgical history and problem list      Past Medical History:   Diagnosis Date    Anemia     with pregnancy , receive iron infusions    Anxiety     Chronic hip pain, left     Chronic hypertension with superimposed preeclampsia 9452    Complication of anesthesia     pt reports required" more anesthesia "for her oral surgery and hand surgery to be effective    Congenital pes planus of both feet     Resolved      COVID-19     with pregnancy around 2020    Difficulty walking     Resolved 2017 , secondary to hip left    Eczema     Hypertension     chronic HTN     Mass of hand, left     Resolved 2017 , benign     Obesity 2016    Pain     chronic back pain     (spontaneous vaginal delivery) 2021    Varicella     vaccine    Visual impairment     eyewear       Past Surgical History:   Procedure Laterality Date    FL INJECTION LEFT HIP (ARTHROGRAM)  2018    HAND SURGERY Left     WISDOM TOOTH EXTRACTION         Family History   Problem Relation Age of Onset    Hypertension Mother     Heart disease Mother     Other Mother         prediabetic    Lupus Mother         Erythematosus    Miscarriages / Stillbirths Mother     Heart disease Father     Hypertension Father     Stroke Father     Diabetes type II Maternal Grandfather     Heart disease Maternal Grandfather     Atrial fibrillation Maternal Grandfather     Diabetes Maternal Grandfather     Diabetes type II Paternal Grandmother     Kidney failure Paternal Grandmother  Diabetes Paternal Grandmother     Anxiety disorder Sister     Depression Sister     Other Sister         Postural Orthostatic Tachycardia Syndrome    Asthma Sister     No Known Problems Maternal Grandmother     Heart disease Paternal Grandfather          Medications have been verified  Objective   /88   Pulse 91   Temp 98 1 °F (36 7 °C) (Temporal)   Resp 18   Ht 5' 10" (1 778 m)   Wt (!) 147 kg (325 lb)   LMP 11/01/2021 (Exact Date)   SpO2 99%   BMI 46 63 kg/m²        Physical Exam     Physical Exam  Constitutional:       Appearance: She is well-developed  HENT:      Head: Normocephalic and atraumatic  Right Ear: External ear normal       Left Ear: External ear normal       Nose: Nose normal       Mouth/Throat:      Pharynx: No oropharyngeal exudate  Cardiovascular:      Rate and Rhythm: Normal rate and regular rhythm  Heart sounds: Normal heart sounds  Pulmonary:      Effort: Pulmonary effort is normal  No respiratory distress  Breath sounds: Normal breath sounds  No wheezing or rales  Chest:      Chest wall: No tenderness  Musculoskeletal:      Cervical back: Normal range of motion and neck supple  Right foot: Decreased range of motion  Normal capillary refill  Tenderness present  No swelling, deformity, bunion, Charcot foot, foot drop, prominent metatarsal heads, bony tenderness or crepitus  Normal pulse  Left foot: Normal         Legs:    Lymphadenopathy:      Cervical: No cervical adenopathy

## 2022-06-17 ENCOUNTER — ROUTINE PRENATAL (OUTPATIENT)
Dept: OBGYN CLINIC | Facility: CLINIC | Age: 22
End: 2022-06-17

## 2022-06-17 VITALS — WEIGHT: 293 LBS | BODY MASS INDEX: 46.89 KG/M2 | SYSTOLIC BLOOD PRESSURE: 136 MMHG | DIASTOLIC BLOOD PRESSURE: 82 MMHG

## 2022-06-17 DIAGNOSIS — Z3A.32 32 WEEKS GESTATION OF PREGNANCY: ICD-10-CM

## 2022-06-17 DIAGNOSIS — O10.919 CHRONIC HYPERTENSION AFFECTING PREGNANCY: Primary | ICD-10-CM

## 2022-06-17 PROCEDURE — PNV: Performed by: OBSTETRICS & GYNECOLOGY

## 2022-06-17 NOTE — PROGRESS NOTES
23 y o    female at 35 3 wga EGA for PNV  BP : 136/82  TWG: -4  CHTN - labetalol 200mg BID - doing well  Feeling well    No complaints  Reviewed 28 week labs  Reviewed PTL precautions and FKCs  Considering vasectomy for contraception  F/u 2 weeks

## 2022-06-21 PROBLEM — O09.893 SHORT INTERVAL BETWEEN PREGNANCIES AFFECTING PREGNANCY IN THIRD TRIMESTER, ANTEPARTUM: Status: ACTIVE | Noted: 2022-06-21

## 2022-06-21 NOTE — PATIENT INSTRUCTIONS
Thank you for choosing us for your  care today  If you have any questions about your ultrasound or care, please do not hesitate to contact us or your primary obstetrician  Some general instructions for your pregnancy are:    Protect against coronavirus: get vaccinated - pregnant women are increased risk of severe COVID  Notify your primary care doctor if you have any symptoms  Exercise: Aim for 22 minutes per day (150 minutes per week) of regular exercise  Walking is great! Nutrition: aim for calcium-rich and iron-rich foods as well as healthy sources of protein  Learn about Preeclampsia: preeclampsia is a common, serious high blood pressure complication in pregnancy  A blood pressure of [id number] (systolic or top number) or 10EXCR (diastolic or bottom number) is not normal and needs evaluation by your doctor  Aspirin is sometimes prescribed in early pregnancy to prevent preeclampsia in women with risk factors - ask your obstetrician if you should be on this medication  If you smoke, try to reduce how many cigarettes you smoke or try to quit completely  Do not vape  Other warning signs to watch out for in pregnancy or postpartum: chest pain, obstructed breathing or shortness of breath, seizures, thoughts of hurting yourself or your baby, bleeding, a painful or swollen leg, fever, or headache (see [facility] Warning Signs [facility])  If these happen call 911  Itching is also not normal in pregnancy and if you experience this, especially over your hands and feet, potentially worse at night, notify your doctors  Kick Counts in Pregnancy   AMBULATORY CARE:   Kick counts  measure how much your baby is moving in your womb  A kick from your baby can be felt as a twist, turn, swish, roll, or jab  It is common to feel your baby kicking at 26 to 28 weeks of pregnancy  You may feel your baby kick as early as 20 weeks of pregnancy  You may want to start counting at 28 weeks     Contact your doctor immediately if:   You feel a change in the number of kicks or movements of your baby  You feel fewer than 10 kicks within 2 hours  You have questions or concerns about your baby's movements  Why measure kick counts:  Your baby's movement may provide information about your baby's health  He or she may move less, or not at all, if there are problems  Your baby may move less if he or she is not getting enough oxygen or nutrition from the placenta  Do not smoke while you are pregnant  Smoking decreases the amount of oxygen that gets to your baby  Talk to your healthcare provider if you need help to quit smoking  Tell your healthcare provider as soon as you feel a change in your baby's movements  When to measure kick counts:   Measure kick counts at the same time every day  Measure kick counts when your baby is awake and most active  Your baby may be most active in the evening  How to measure kick counts:  Check that your baby is awake before you measure kick counts  You can wake up your baby by lightly pushing on your belly, walking, or drinking something cold  Your healthcare provider may tell you different ways to measure kick counts  You may be told to do the following:  Use a chart or clock to keep track of the time you start and finish counting  Sit in a chair or lie on your left side  Place your hands on the largest part of your belly  Count until you reach 10 kicks  Write down how much time it takes to count 10 kicks  It may take 30 minutes to 2 hours to count 10 kicks  It should not take more than 2 hours to count 10 kicks  Follow up with your doctor as directed:  Write down your questions so you remember to ask them during your visits  © Copyright [employer] 2022 Information is for End User's use only and may not be sold, redistributed or otherwise used for commercial purposes   All illustrations and images included in CareNotes® are the copyrighted property of A D A M , Inc  or [facility] David Lozada   The above information is an  only  It is not intended as medical advice for individual conditions or treatments  Talk to your doctor, nurse or pharmacist before following any medical regimen to see if it is safe and effective for you

## 2022-06-22 ENCOUNTER — ULTRASOUND (OUTPATIENT)
Dept: PERINATAL CARE | Facility: OTHER | Age: 22
End: 2022-06-22
Payer: OTHER GOVERNMENT

## 2022-06-22 ENCOUNTER — ROUTINE PRENATAL (OUTPATIENT)
Dept: PERINATAL CARE | Facility: OTHER | Age: 22
End: 2022-06-22
Payer: OTHER GOVERNMENT

## 2022-06-22 VITALS
HEIGHT: 70 IN | SYSTOLIC BLOOD PRESSURE: 117 MMHG | BODY MASS INDEX: 41.95 KG/M2 | HEART RATE: 93 BPM | WEIGHT: 293 LBS | DIASTOLIC BLOOD PRESSURE: 72 MMHG

## 2022-06-22 DIAGNOSIS — Z3A.33 33 WEEKS GESTATION OF PREGNANCY: ICD-10-CM

## 2022-06-22 DIAGNOSIS — O99.210 MATERNAL MORBID OBESITY, ANTEPARTUM (HCC): Primary | ICD-10-CM

## 2022-06-22 DIAGNOSIS — O99.213 OBESITY AFFECTING PREGNANCY IN THIRD TRIMESTER: ICD-10-CM

## 2022-06-22 DIAGNOSIS — O10.919 CHRONIC HYPERTENSION AFFECTING PREGNANCY: ICD-10-CM

## 2022-06-22 DIAGNOSIS — E66.01 MATERNAL MORBID OBESITY, ANTEPARTUM (HCC): Primary | ICD-10-CM

## 2022-06-22 DIAGNOSIS — O10.919 CHRONIC HYPERTENSION AFFECTING PREGNANCY: Primary | ICD-10-CM

## 2022-06-22 DIAGNOSIS — O09.893 SHORT INTERVAL BETWEEN PREGNANCIES AFFECTING PREGNANCY IN THIRD TRIMESTER, ANTEPARTUM: ICD-10-CM

## 2022-06-22 DIAGNOSIS — Z36.89 ENCOUNTER FOR ULTRASOUND TO ASSESS FETAL GROWTH: ICD-10-CM

## 2022-06-22 PROCEDURE — 76816 OB US FOLLOW-UP PER FETUS: CPT | Performed by: OBSTETRICS & GYNECOLOGY

## 2022-06-22 PROCEDURE — 59025 FETAL NON-STRESS TEST: CPT | Performed by: NURSE PRACTITIONER

## 2022-06-22 PROCEDURE — NC001 PR NO CHARGE: Performed by: OBSTETRICS & GYNECOLOGY

## 2022-06-22 PROCEDURE — 99213 OFFICE O/P EST LOW 20 MIN: CPT | Performed by: NURSE PRACTITIONER

## 2022-06-22 NOTE — LETTER
NST sleeve cover sheet    Patient name: Manju Ornelas  : 2000  MRN: [id number]    BEATRIZ: Estimated Date of Delivery: 22    Obstetrician: _Riverseddy______    Reason(s) for testing: LEONA PULIDO  __________________________________________      Testing frequency:    ___ 2x/wk  _x__ 1x/wk  ___ Dopplers  ___ BPP?       Last growth scan: __________________________________________

## 2022-06-22 NOTE — LETTER
June 22, 2022     Ruby Waters MD  [address] [facility] [address]    Patient: Emily Everett   YOB: 2000   Date of Visit: 6/22/2022       Dear Dr Joss Cr: Thank you for referring Emily Everett to me for evaluation  Below are my notes for this consultation  If you have questions, please do not hesitate to call me  I look forward to following your patient along with you  Sincerely,        Tori Dejesus MD        CC: MD Norberto Woods, DO Tori Dejesus MD  6/22/2022  2:09 PM  Sign when Signing Visit  Emily Everett has no complaints today  She reports regular fetal movements and does not report any problems  She is here today at 33w2d for an ultrasound for fetal growth and missed anatomy  Problem list:  1  Short interval pregnancy  2  Obesity  3  Chronic hypertension on Labetolol 200 mg bid with bps < 140/90 which is well controlled  Ultrasound findings: The ultrasound today shows fetal growth at [facility] and normal fluid  Pregnancy ultrasound has limitations and is unable to detect all forms of fetal congenital abnormalities  The inaccuracy in the EFW can be off by 1 lb either way in the third trimester  Specific counseling was provided on the following problems:  1  Chronic HTN on medication  She was strongly encouraged to continue to monitor her blood pressure at home  She is aware to stop her aspirin at 36 weeks  She is scheduled for induction of labor on 07/19/22 at 37w2d   2  Obesity  Exercise encouraged  Follow up recommended: Continue weekly NST/MELONIE    Pre visit time reviewing her records 5 minutes  Face to face time 5 minutes  Post visit time on documentation of note, updating her problem list, adding orders and prescriptions 5 minutes  Procedures that were completed today were charged separately  The level of decision making was low complexity      Wendy HODGSON    I supervised the Advanced Practitioner  I discussed the case with the Advanced Practitioner and reviewed the AP note, ultrasound pictures, prescribed medications, and orders placed  I discussed with Leo Garcia that her  blood pressures appear to be under good control and she has no evidence for preeclampsia or IUGR  Per [address] Seymour she can be offered induction between 37 weeks to 39 weeks and 6 days  Delivery between 37-38 weeks is considered early term and may be associated with increased risk for prematurity which includes lung immaturity, jaundice, poor feeding or poor temperature intolerance which may increase the chance that her baby might need to go to the NICU  I asked that she speak to her OB providers to see  if a delay in her induction is an option as long as her blood pressures continue to be well controlled and she does not show any sign of developing preeclampsia or nonreassuring fetal testing       Marc Garcia MD

## 2022-06-22 NOTE — PROGRESS NOTES
Daxa William has no complaints today  She reports regular fetal movements and does not report any problems  She is here today at 33w2d for an ultrasound for fetal growth and missed anatomy  Problem list:  1  Short interval pregnancy  2  Obesity  3  Chronic hypertension on Labetolol 200 mg bid with bps < 140/90 which is well controlled  Ultrasound findings: The ultrasound today shows fetal growth at [facility] and normal fluid  Pregnancy ultrasound has limitations and is unable to detect all forms of fetal congenital abnormalities  The inaccuracy in the EFW can be off by 1 lb either way in the third trimester  Specific counseling was provided on the following problems:  1  Chronic HTN on medication  She was strongly encouraged to continue to monitor her blood pressure at home  She is aware to stop her aspirin at 36 weeks  She is scheduled for induction of labor on 07/19/22 at 37w2d   2  Obesity  Exercise encouraged  Follow up recommended: Continue weekly NST/MELONIE    Pre visit time reviewing her records 5 minutes  Face to face time 5 minutes  Post visit time on documentation of note, updating her problem list, adding orders and prescriptions 5 minutes  Procedures that were completed today were charged separately  The level of decision making was low complexity  [place] Hector HODGSON    I supervised the Advanced Practitioner  I discussed the case with the Advanced Practitioner and reviewed the AP note, ultrasound pictures, prescribed medications, and orders placed  I discussed with Lily Rodriguezpe that her  blood pressures appear to be under good control and she has no evidence for preeclampsia or IUGR  Per Alejandra Seymour she can be offered induction between 37 weeks to 39 weeks and 6 days    Delivery between 37-38 weeks is considered early term and may be associated with increased risk for prematurity which includes lung immaturity, jaundice, poor feeding or poor temperature intolerance which may increase the chance that her baby might need to go to the NICU  I asked that she speak to her OB providers to see  if a delay in her induction is an option as long as her blood pressures continue to be well controlled and she does not show any sign of developing preeclampsia or nonreassuring fetal testing       Beba Castañeda MD

## 2022-07-01 ENCOUNTER — ROUTINE PRENATAL (OUTPATIENT)
Dept: OBGYN CLINIC | Facility: CLINIC | Age: 22
End: 2022-07-01

## 2022-07-01 VITALS — DIASTOLIC BLOOD PRESSURE: 78 MMHG | SYSTOLIC BLOOD PRESSURE: 126 MMHG | BODY MASS INDEX: 46.63 KG/M2 | WEIGHT: 293 LBS

## 2022-07-01 DIAGNOSIS — O10.913 CHRONIC HYPERTENSION COMPLICATING OR REASON FOR CARE DURING PREGNANCY, THIRD TRIMESTER: Primary | ICD-10-CM

## 2022-07-01 DIAGNOSIS — Z3A.34 34 WEEKS GESTATION OF PREGNANCY: ICD-10-CM

## 2022-07-01 PROCEDURE — PNV: Performed by: PHYSICIAN ASSISTANT

## 2022-07-01 NOTE — PROGRESS NOTES
Patient has no complaints or concerns at this time  Urine dip positive trace protein/negative glucose

## 2022-07-01 NOTE — PROGRESS NOTES
Patient is a 22 YO  female presenting to the office at 34 4 weeks for routine OB care     BP: 126/78  TWG: -6lb  Fetal Movement: yes good movement  Feeling well today  Denies LOF, CTX, VB  NST today reactive and reassuring, MELONIE 16 6  CHTN - IOL scheduled  Reviewed precautions  Call for concerns  RTO 1 week

## 2022-07-06 ENCOUNTER — ROUTINE PRENATAL (OUTPATIENT)
Dept: OBGYN CLINIC | Facility: CLINIC | Age: 22
End: 2022-07-06
Payer: OTHER GOVERNMENT

## 2022-07-06 VITALS — DIASTOLIC BLOOD PRESSURE: 72 MMHG | WEIGHT: 293 LBS | BODY MASS INDEX: 46.92 KG/M2 | SYSTOLIC BLOOD PRESSURE: 144 MMHG

## 2022-07-06 DIAGNOSIS — O09.893 SHORT INTERVAL BETWEEN PREGNANCIES AFFECTING PREGNANCY IN THIRD TRIMESTER, ANTEPARTUM: ICD-10-CM

## 2022-07-06 DIAGNOSIS — O99.019 IRON DEFICIENCY ANEMIA OF MOTHER DURING PREGNANCY: ICD-10-CM

## 2022-07-06 DIAGNOSIS — D50.9 IRON DEFICIENCY ANEMIA OF MOTHER DURING PREGNANCY: ICD-10-CM

## 2022-07-06 DIAGNOSIS — Z3A.35 35 WEEKS GESTATION OF PREGNANCY: Primary | ICD-10-CM

## 2022-07-06 DIAGNOSIS — O10.919 CHRONIC HYPERTENSION AFFECTING PREGNANCY: ICD-10-CM

## 2022-07-06 PROCEDURE — 59025 FETAL NON-STRESS TEST: CPT | Performed by: OBSTETRICS & GYNECOLOGY

## 2022-07-06 PROCEDURE — PNV: Performed by: OBSTETRICS & GYNECOLOGY

## 2022-07-06 NOTE — PROGRESS NOTES
23 y o  [place] female at 35w2d (Estimated Date of Delivery: 22) for PNV  Pre- Vitals    Flowsheet Row Most Recent Value   Prenatal Assessment    Fetal Heart Rate 120   Movement Present   Prenatal Vitals    Blood Pressure 144/72   Weight - Scale 148 kg (327 lb)   Urine Albumin/Glucose    Dilation/Effacement/Station    Vaginal Drainage    Edema         TWG: -1 814 kg (-4 lb)    Rollen Hodgkin presents for return OB visit  Feeling well  Denies contractions, vaginal bleeding, and leakage  Reports daily FM  cHTN - currently on labetalol 200mg bid  NST performed today  Reactive and reassuring     - IOL scheduled for 22 at 7am     - delivery indicated between 37-38 weeks  - consent for IOL discussed and risks reviewed  Consent obtained today  Follow up in 1 week

## 2022-07-06 NOTE — PATIENT INSTRUCTIONS
Kick Counts in Pregnancy   WHAT YOU NEED TO KNOW:   Kick counts measure how much your baby is moving in your womb  A kick from your baby can be felt as a twist, turn, swish, roll, or jab  It is common to feel your baby kicking at 26 to 28 weeks of pregnancy  You may feel your baby kick as early as 20 weeks of pregnancy  You may want to start counting at 28 weeks  DISCHARGE INSTRUCTIONS:   Contact your doctor immediately if:   You feel a change in the number of kicks or movements of your baby  You feel fewer than 10 kicks within 2 hours  You have questions or concerns about your baby's movements  Why measure kick counts:  Your baby's movement may provide information about your baby's health  He or she may move less, or not at all, if there are problems  Your baby may move less if he or she is not getting enough oxygen or nutrition from the placenta  Do not smoke while you are pregnant  Smoking decreases the amount of oxygen that gets to your baby  Talk to your healthcare provider if you need help to quit smoking  Tell your healthcare provider as soon as you feel a change in your baby's movements  When to measure kick counts:   Measure kick counts at the same time every day  Measure kick counts when your baby is awake and most active  Your baby may be most active in the evening  How to measure kick counts:  Check that your baby is awake before you measure kick counts  You can wake up your baby by lightly pushing on your belly, walking, or drinking something cold  Your healthcare provider may tell you different ways to measure kick counts  You may be told to do the following:  Use a chart or clock to keep track of the time you start and finish counting  Sit in a chair or lie on your left side  Place your hands on the largest part of your belly  Count until you reach 10 kicks  Write down how much time it takes to count 10 kicks  It may take 30 minutes to 2 hours to count 10 kicks  It should not take more than 2 hours to count 10 kicks  Follow up with your doctor as directed:  Write down your questions so you remember to ask them during your visits  © Copyright [employer] 2022 Information is for End User's use only and may not be sold, redistributed or otherwise used for commercial purposes  All illustrations and images included in CareNotes® are the copyrighted property of A D A M , Inc  or [facility] David Lozada   The above information is an  only  It is not intended as medical advice for individual conditions or treatments  Talk to your doctor, nurse or pharmacist before following any medical regimen to see if it is safe and effective for you

## 2022-07-15 ENCOUNTER — ROUTINE PRENATAL (OUTPATIENT)
Dept: OBGYN CLINIC | Facility: CLINIC | Age: 22
End: 2022-07-15
Payer: OTHER GOVERNMENT

## 2022-07-15 VITALS — WEIGHT: 293 LBS | SYSTOLIC BLOOD PRESSURE: 134 MMHG | BODY MASS INDEX: 46.63 KG/M2 | DIASTOLIC BLOOD PRESSURE: 76 MMHG

## 2022-07-15 DIAGNOSIS — Z3A.36 36 WEEKS GESTATION OF PREGNANCY: ICD-10-CM

## 2022-07-15 DIAGNOSIS — O99.213 OBESITY AFFECTING PREGNANCY IN THIRD TRIMESTER: ICD-10-CM

## 2022-07-15 DIAGNOSIS — O09.893 SHORT INTERVAL BETWEEN PREGNANCIES AFFECTING PREGNANCY IN THIRD TRIMESTER, ANTEPARTUM: ICD-10-CM

## 2022-07-15 DIAGNOSIS — O10.919 CHRONIC HYPERTENSION AFFECTING PREGNANCY: Primary | ICD-10-CM

## 2022-07-15 PROCEDURE — PNV: Performed by: OBSTETRICS & GYNECOLOGY

## 2022-07-15 PROCEDURE — 59025 FETAL NON-STRESS TEST: CPT | Performed by: OBSTETRICS & GYNECOLOGY

## 2022-07-15 PROCEDURE — 87491 CHLMYD TRACH DNA AMP PROBE: CPT | Performed by: OBSTETRICS & GYNECOLOGY

## 2022-07-15 PROCEDURE — 87591 N.GONORRHOEAE DNA AMP PROB: CPT | Performed by: OBSTETRICS & GYNECOLOGY

## 2022-07-15 NOTE — PROGRESS NOTES
23 y o   female at 37w2d (Estimated Date of Delivery: 22) for PNV  Pre- Vitals    Flowsheet Row Most Recent Value   Prenatal Assessment    Fetal Heart Rate 135   Movement Present   Prenatal Vitals    Blood Pressure 134/76   Weight - Scale 147 kg (325 lb)   Urine Albumin/Glucose    Dilation/Effacement/Station    Cervical Dilation 3   Cervical Effacement 50   Fetal Station -2   Vaginal Drainage    Edema    LLE Edema None   RLE Edema None   Facial Edema None        TWG: -2 722 kg (-6 lb)    Claudeen Cullens presents for return OB visit  Feeling well  No complaints  cHTN - normotensive BP today  NST performed today reactive and reassuring  IOL scheduled for 22 at 7am  Consent in chart and patient has copy  Follow up postpartum

## 2022-07-16 LAB
C TRACH DNA SPEC QL NAA+PROBE: NEGATIVE
N GONORRHOEA DNA SPEC QL NAA+PROBE: NEGATIVE

## 2022-07-17 ENCOUNTER — HOSPITAL ENCOUNTER (EMERGENCY)
Facility: HOSPITAL | Age: 22
Discharge: HOME | End: 2022-07-17
Attending: OBSTETRICS & GYNECOLOGY | Admitting: OBSTETRICS & GYNECOLOGY
Payer: OTHER GOVERNMENT

## 2022-07-17 ENCOUNTER — NURSE TRIAGE (OUTPATIENT)
Dept: OTHER | Facility: OTHER | Age: 22
End: 2022-07-17

## 2022-07-17 VITALS
RESPIRATION RATE: 20 BRPM | HEIGHT: 70 IN | SYSTOLIC BLOOD PRESSURE: 141 MMHG | BODY MASS INDEX: 41.95 KG/M2 | HEART RATE: 89 BPM | TEMPERATURE: 98.6 F | DIASTOLIC BLOOD PRESSURE: 75 MMHG | WEIGHT: 293 LBS

## 2022-07-17 PROCEDURE — 99214 OFFICE O/P EST MOD 30 MIN: CPT

## 2022-07-17 PROCEDURE — 76815 OB US LIMITED FETUS(S): CPT

## 2022-07-17 PROCEDURE — NC001 PR NO CHARGE: Performed by: OBSTETRICS & GYNECOLOGY

## 2022-07-17 NOTE — TELEPHONE ENCOUNTER
Reason for Disposition   Contractions < 10 minutes apart for 1 hour (i e , 6 or more contractions an hour)    Answer Assessment - Initial Assessment Questions  1  ONSET: "When did the symptoms begin?"         Last night   2  CONTRACTIONS: "Describe the contractions that you are having " (e g , duration, frequency, regularity, severity)      Shooting pain in lower back and crampy in lower belly and pelvis  3  BEATRIZ: "What date are you expecting to deliver?"      Scheduled for induction on Tuesday BEATRIZ 22  4  PARITY: "Have you had a baby before?" If Yes, ask: "How long did the labor last?"      Second baby   5  FETAL MOVEMENT: "Has the baby's movement decreased or changed significantly from normal?"      Moving a lot   6   OTHER SYMPTOMS: "Do you have any other symptoms?" (e g , leaking fluid from vagina, fever, hand/facial swelling)      No fever, feet more swollen, hands are usually 148/101, 132/81    Protocols used: PREGNANCY - LABOR - -ADULT-

## 2022-07-17 NOTE — TELEPHONE ENCOUNTER
Regarding: Labor Symtoms/ High Risk  ----- Message from Meghan Oconnor sent at 7/17/2022  7:14 PM EDT -----  " I am 36 weeks pregnant, and I am scheduled for an induction on Tuesday  I've been cramping extra  I also have lower back pain  Last time I went in I was 3 cm dilated   I am not if I should in since I am high risk "

## 2022-07-18 LAB — GP B STREP DNA SPEC QL NAA+PROBE: POSITIVE

## 2022-07-18 NOTE — PROGRESS NOTES
L&D Triage Note - OB/GYN  Roberto Seth 23 y o  female MRN: [id number]  Unit/Bed#: LD TRIAGE 4-01 Encounter: [id number]      ASSESSMENT:    Roberto Seth is a 23 y o  [facility] at 36w6d here for contractions    PLAN:    1) r/o labor  - Nitrazine negative, no ferning, clue cells or hyphae on slides  MELONIE 14 87   - SVE: 1cm/thick/high  -  FHT Cat 1 reactive, rate 130 bpm, 15x15 accelerations, no decelerations  - She is scheduled for IOL on 7/19/22 at 7 am      - Continue routine prenatal care  - Discharge from [place] triage with term labor precautions    - Reviewed rupture of membranes, false vs true labor, decreased fetal movement, and vaginal bleeding               - Reviewed signs and symptoms of preeclampsia   - Pt to call provider with any concerns and follow up at her next scheduled prenatal appointment    - Case discussed with Dr Jean Marie Nichols:    Roberto Seth 23 y o  [facility] at [address] [place] [address] with an Estimated Date of Delivery: 8/8/22     She presents today for increasing contractions  States that they began yesterday morning and have increased in frequency, but has not been able to time them consistently  Admits to increased pain in her lower back since yesterday as well  She reports good fetal movement  Denies any LOF, vaginal bleeding, or discharge  Denies any HA, vision changes, chest pain, shortness of breath, abdominal pain, calf pain or any other complaints at this time  She is scheduled for IOL for [facility] on Tuesday AM (7/19)  Her current obstetrical history is significant for chronic HTN managed on Labetalol 200 mg BID       Her past obstetrical history is significant for preeclampsia in previous pregnancy (2021)    OBJECTIVE:    Vitals:    07/17/22 2117   BP: 142/70   Pulse: 89   Resp: 20   Temp: 98 6 °F (37 °C)       ROS:  Constitutional: Negative  Respiratory: Negative  Cardiovascular: Negative    Gastrointestinal: Negative    General Physical Exam:  General: in no apparent distress, well developed and well nourished and alert  Cardiovascular: Cor RRR  Lungs: non-labored breathing  Abdomen: abdomen is soft without significant tenderness, masses, organomegaly or guarding  Lower extremeties: nontender    Cervical Exam  Speculum: Cervical os is closed   White vaginal discharged noted  SVE: 1 / 0% / -5    Fetal monitoring:  FHT:  130 bpm/ Moderate 6 - 25 bpm / 15 x 15 accelerations present, no decelerations  [place]: contractions      KOH/WTMT:     Infection:   - no clue cells    - no hyphae   - no trichomonads present    Membrane status   - no ferning   - no nitrazene   - no pooling     Imaging:           Abd  US   MELONIE      - Q1 1 93cm     - Q2 3 53 cm     - Q3 4 64cm     - Q4 4 77cm     - Total: 14 87cm   Presentation: [facility], DO  OBGYN PGY-1  7/17/2022 10:10 PM

## 2022-07-18 NOTE — PROCEDURES
Cathie Enrique, a [place] at 36w6d with an BEATRIZ of 8/8/2022, by Last Menstrual Period, was seen at [address] for the following procedure(s): $Procedure Type: MELONIE]         4 Quadrant MELONIE  MELONIE Q1 (cm): 1 9 cm  MELONIE Q2 (cm): 3 5 cm  MELONIE Q3 (cm): 4 6 cm  MELONIE Q4 (cm): 4 8 cm  MELONIE TOTAL (cm): 14 8 cm

## 2022-07-19 ENCOUNTER — HOSPITAL ENCOUNTER (EMERGENCY)
Dept: LABOR AND DELIVERY | Facility: HOSPITAL | Age: 22
Discharge: HOME | End: 2022-07-19
Payer: OTHER GOVERNMENT

## 2022-07-19 ENCOUNTER — HOSPITAL ENCOUNTER (INPATIENT)
Facility: HOSPITAL | Age: 22
LOS: 2 days | Discharge: HOME | End: 2022-07-21
Attending: OBSTETRICS & GYNECOLOGY | Admitting: OBSTETRICS & GYNECOLOGY
Payer: OTHER GOVERNMENT

## 2022-07-19 ENCOUNTER — ANESTHESIA (INPATIENT)
Dept: ANESTHESIOLOGY | Facility: HOSPITAL | Age: 22
End: 2022-07-19
Payer: OTHER GOVERNMENT

## 2022-07-19 ENCOUNTER — ANESTHESIA EVENT (INPATIENT)
Dept: ANESTHESIOLOGY | Facility: HOSPITAL | Age: 22
End: 2022-07-19
Payer: OTHER GOVERNMENT

## 2022-07-19 DIAGNOSIS — Z3A.36 36 WEEKS GESTATION OF PREGNANCY: Primary | ICD-10-CM

## 2022-07-19 PROBLEM — Z28.39 SUSCEPTIBLE TO VARICELLA (NON-IMMUNE), CURRENTLY PREGNANT: Status: ACTIVE | Noted: 2022-07-19

## 2022-07-19 PROBLEM — Z3A.37 37 WEEKS GESTATION OF PREGNANCY: Status: ACTIVE | Noted: 2022-02-15

## 2022-07-19 PROBLEM — B95.1 POSITIVE GBS TEST: Status: ACTIVE | Noted: 2022-07-19

## 2022-07-19 PROBLEM — O09.899 SUSCEPTIBLE TO VARICELLA (NON-IMMUNE), CURRENTLY PREGNANT: Status: ACTIVE | Noted: 2022-07-19

## 2022-07-19 PROBLEM — O99.810 PREGNANCY WITH ABNORMAL GLUCOSE TOLERANCE TEST (GTT): Status: ACTIVE | Noted: 2022-07-19

## 2022-07-19 LAB
ABO GROUP BLD: NORMAL
ALBUMIN SERPL BCP-MCNC: 3.2 G/DL (ref 3.5–5)
ALP SERPL-CCNC: 127 U/L (ref 34–104)
ALT SERPL W P-5'-P-CCNC: 9 U/L (ref 7–52)
ANION GAP SERPL CALCULATED.3IONS-SCNC: 8 MMOL/L (ref 4–13)
AST SERPL W P-5'-P-CCNC: 16 U/L (ref 13–39)
BASE EXCESS BLDCOA CALC-SCNC: -4.2 MMOL/L (ref 3–11)
BASE EXCESS BLDCOV CALC-SCNC: -2.8 MMOL/L (ref 1–9)
BILIRUB SERPL-MCNC: 0.3 MG/DL (ref 0.2–1)
BLD GP AB SCN SERPL QL: NEGATIVE
BUN SERPL-MCNC: 11 MG/DL (ref 5–25)
CALCIUM ALBUM COR SERPL-MCNC: 9.2 MG/DL (ref 8.3–10.1)
CALCIUM SERPL-MCNC: 8.6 MG/DL (ref 8.4–10.2)
CHLORIDE SERPL-SCNC: 107 MMOL/L (ref 96–108)
CO2 SERPL-SCNC: 20 MMOL/L (ref 21–32)
CREAT SERPL-MCNC: 0.84 MG/DL (ref 0.6–1.3)
CREAT UR-MCNC: 115.8 MG/DL
ERYTHROCYTE [DISTWIDTH] IN BLOOD BY AUTOMATED COUNT: 13.9 % (ref 11.6–15.1)
GFR SERPL CREATININE-BSD FRML MDRD: 97 ML/MIN/1.73SQ M
GLUCOSE SERPL-MCNC: 81 MG/DL (ref 65–140)
HCO3 BLDCOA-SCNC: 23.2 MMOL/L (ref 17.3–27.3)
HCO3 BLDCOV-SCNC: 21.3 MMOL/L (ref 12.2–28.6)
HCT VFR BLD AUTO: 30.3 % (ref 34.8–46.1)
HGB BLD-MCNC: 9.7 G/DL (ref 11.5–15.4)
MCH RBC QN AUTO: 26.9 PG (ref 26.8–34.3)
MCHC RBC AUTO-ENTMCNC: 32 G/DL (ref 31.4–37.4)
MCV RBC AUTO: 84 FL (ref 82–98)
O2 CT VFR BLDCOA CALC: 9.5 ML/DL
OXYHGB MFR BLDCOA: 43.4 %
OXYHGB MFR BLDCOV: 61.2 %
PCO2 BLDCOA: 51.1 MM[HG] (ref 30–60)
PCO2 BLDCOV: 35.4 MM HG (ref 27–43)
PH BLDCOA: 7.28 [PH] (ref 7.23–7.43)
PH BLDCOV: 7.4 [PH] (ref 7.19–7.49)
PLATELET # BLD AUTO: 230 THOUSANDS/UL (ref 149–390)
PMV BLD AUTO: 9.8 FL (ref 8.9–12.7)
PO2 BLDCOA: 20 MM HG (ref 5–25)
PO2 BLDCOV: 23.4 MM HG (ref 15–45)
POTASSIUM SERPL-SCNC: 4.1 MMOL/L (ref 3.5–5.3)
PROT SERPL-MCNC: 6.5 G/DL (ref 6.4–8.4)
PROT UR-MCNC: 26 MG/DL
PROT/CREAT UR: 0.22 MG/G{CREAT} (ref 0–0.1)
RBC # BLD AUTO: 3.6 MILLION/UL (ref 3.81–5.12)
RH BLD: POSITIVE
RPR SER QL: NORMAL
SAO2 % BLDCOV: 12.7 ML/DL
SODIUM SERPL-SCNC: 135 MMOL/L (ref 135–147)
SPECIMEN EXPIRATION DATE: NORMAL
WBC # BLD AUTO: 9.83 THOUSAND/UL (ref 4.31–10.16)

## 2022-07-19 PROCEDURE — 82805 BLOOD GASES W/O2 SATURATION: CPT | Performed by: OBSTETRICS & GYNECOLOGY

## 2022-07-19 PROCEDURE — 86900 BLOOD TYPING SEROLOGIC ABO: CPT

## 2022-07-19 PROCEDURE — NC001 PR NO CHARGE: Performed by: OBSTETRICS & GYNECOLOGY

## 2022-07-19 PROCEDURE — 59400 OBSTETRICAL CARE: CPT | Performed by: OBSTETRICS & GYNECOLOGY

## 2022-07-19 PROCEDURE — 84156 ASSAY OF PROTEIN URINE: CPT

## 2022-07-19 PROCEDURE — 3E033VJ INTRODUCTION OF OTHER HORMONE INTO PERIPHERAL VEIN, PERCUTANEOUS APPROACH: ICD-10-PCS | Performed by: OBSTETRICS & GYNECOLOGY

## 2022-07-19 PROCEDURE — 82570 ASSAY OF URINE CREATININE: CPT

## 2022-07-19 PROCEDURE — 80053 COMPREHEN METABOLIC PANEL: CPT

## 2022-07-19 PROCEDURE — 10907ZC DRAINAGE OF AMNIOTIC FLUID, THERAPEUTIC FROM PRODUCTS OF CONCEPTION, VIA NATURAL OR ARTIFICIAL OPENING: ICD-10-PCS | Performed by: OBSTETRICS & GYNECOLOGY

## 2022-07-19 PROCEDURE — 86901 BLOOD TYPING SEROLOGIC RH(D): CPT

## 2022-07-19 PROCEDURE — 4A1HXCZ MONITORING OF PRODUCTS OF CONCEPTION, CARDIAC RATE, EXTERNAL APPROACH: ICD-10-PCS | Performed by: OBSTETRICS & GYNECOLOGY

## 2022-07-19 PROCEDURE — 86592 SYPHILIS TEST NON-TREP QUAL: CPT

## 2022-07-19 PROCEDURE — 86850 RBC ANTIBODY SCREEN: CPT

## 2022-07-19 PROCEDURE — 86923 COMPATIBILITY TEST ELECTRIC: CPT

## 2022-07-19 PROCEDURE — 85027 COMPLETE CBC AUTOMATED: CPT

## 2022-07-19 RX ORDER — LORATADINE 10 MG/1
10 TABLET ORAL DAILY PRN
Status: DISCONTINUED | OUTPATIENT
Start: 2022-07-19 | End: 2022-07-21 | Stop reason: HOSPADM

## 2022-07-19 RX ORDER — CALCIUM CARBONATE 200(500)MG
1000 TABLET,CHEWABLE ORAL DAILY PRN
Status: DISCONTINUED | OUTPATIENT
Start: 2022-07-19 | End: 2022-07-21 | Stop reason: HOSPADM

## 2022-07-19 RX ORDER — OXYTOCIN/RINGER'S LACTATE 30/500 ML
1-30 PLASTIC BAG, INJECTION (ML) INTRAVENOUS
Status: DISCONTINUED | OUTPATIENT
Start: 2022-07-19 | End: 2022-07-19

## 2022-07-19 RX ORDER — ONDANSETRON 2 MG/ML
4 INJECTION INTRAMUSCULAR; INTRAVENOUS EVERY 8 HOURS PRN
Status: DISCONTINUED | OUTPATIENT
Start: 2022-07-19 | End: 2022-07-21 | Stop reason: HOSPADM

## 2022-07-19 RX ORDER — LIDOCAINE HYDROCHLORIDE AND EPINEPHRINE 15; 5 MG/ML; UG/ML
INJECTION, SOLUTION EPIDURAL AS NEEDED
Status: DISCONTINUED | OUTPATIENT
Start: 2022-07-19 | End: 2022-07-20 | Stop reason: HOSPADM

## 2022-07-19 RX ORDER — LABETALOL 200 MG/1
200 TABLET, FILM COATED ORAL 2 TIMES DAILY
Status: DISCONTINUED | OUTPATIENT
Start: 2022-07-19 | End: 2022-07-21 | Stop reason: HOSPADM

## 2022-07-19 RX ORDER — OXYCODONE HYDROCHLORIDE 5 MG/1
5 TABLET ORAL EVERY 4 HOURS PRN
Status: DISCONTINUED | OUTPATIENT
Start: 2022-07-19 | End: 2022-07-21 | Stop reason: HOSPADM

## 2022-07-19 RX ORDER — DOCUSATE SODIUM 100 MG/1
100 CAPSULE, LIQUID FILLED ORAL 2 TIMES DAILY
Status: DISCONTINUED | OUTPATIENT
Start: 2022-07-20 | End: 2022-07-21 | Stop reason: HOSPADM

## 2022-07-19 RX ORDER — SODIUM CHLORIDE, SODIUM LACTATE, POTASSIUM CHLORIDE, CALCIUM CHLORIDE 600; 310; 30; 20 MG/100ML; MG/100ML; MG/100ML; MG/100ML
125 INJECTION, SOLUTION INTRAVENOUS CONTINUOUS
Status: DISCONTINUED | OUTPATIENT
Start: 2022-07-19 | End: 2022-07-19

## 2022-07-19 RX ORDER — ONDANSETRON 2 MG/ML
4 INJECTION INTRAMUSCULAR; INTRAVENOUS EVERY 4 HOURS PRN
Status: DISCONTINUED | OUTPATIENT
Start: 2022-07-19 | End: 2022-07-19

## 2022-07-19 RX ORDER — OXYTOCIN/RINGER'S LACTATE 30/500 ML
250 PLASTIC BAG, INJECTION (ML) INTRAVENOUS ONCE
Status: DISCONTINUED | OUTPATIENT
Start: 2022-07-19 | End: 2022-07-21 | Stop reason: HOSPADM

## 2022-07-19 RX ORDER — DIAPER,BRIEF,INFANT-TODD,DISP
1 EACH MISCELLANEOUS DAILY PRN
Status: DISCONTINUED | OUTPATIENT
Start: 2022-07-19 | End: 2022-07-21 | Stop reason: HOSPADM

## 2022-07-19 RX ORDER — IBUPROFEN 600 MG/1
600 TABLET ORAL EVERY 6 HOURS
Status: DISCONTINUED | OUTPATIENT
Start: 2022-07-19 | End: 2022-07-21 | Stop reason: HOSPADM

## 2022-07-19 RX ORDER — ACETAMINOPHEN 325 MG/1
650 TABLET ORAL EVERY 4 HOURS PRN
Status: DISCONTINUED | OUTPATIENT
Start: 2022-07-19 | End: 2022-07-21 | Stop reason: HOSPADM

## 2022-07-19 RX ADMIN — LIDOCAINE HYDROCHLORIDE AND EPINEPHRINE 5 ML: 15; 5 INJECTION, SOLUTION EPIDURAL at 18:45

## 2022-07-19 RX ADMIN — SODIUM CHLORIDE 5 MILLION UNITS: 0.9 INJECTION, SOLUTION INTRAVENOUS at 08:37

## 2022-07-19 RX ADMIN — SODIUM CHLORIDE 2.5 MILLION UNITS: 9 INJECTION, SOLUTION INTRAVENOUS at 17:29

## 2022-07-19 RX ADMIN — Medication 2 MILLI-UNITS/MIN: at 14:45

## 2022-07-19 RX ADMIN — ROPIVACAINE HYDROCHLORIDE: 2 INJECTION, SOLUTION EPIDURAL; INFILTRATION at 19:00

## 2022-07-19 RX ADMIN — SODIUM CHLORIDE, POTASSIUM CHLORIDE, SODIUM LACTATE AND CALCIUM CHLORIDE 125 ML/HR: 600; 310; 30; 20 INJECTION, SOLUTION INTRAVENOUS at 08:37

## 2022-07-19 RX ADMIN — LABETALOL HYDROCHLORIDE 200 MG: 200 TABLET, FILM COATED ORAL at 09:38

## 2022-07-19 RX ADMIN — SODIUM CHLORIDE 2.5 MILLION UNITS: 9 INJECTION, SOLUTION INTRAVENOUS at 13:29

## 2022-07-19 RX ADMIN — SODIUM CHLORIDE, POTASSIUM CHLORIDE, SODIUM LACTATE AND CALCIUM CHLORIDE 125 ML/HR: 600; 310; 30; 20 INJECTION, SOLUTION INTRAVENOUS at 21:31

## 2022-07-19 RX ADMIN — SODIUM CHLORIDE 2.5 MILLION UNITS: 9 INJECTION, SOLUTION INTRAVENOUS at 21:28

## 2022-07-19 RX ADMIN — LABETALOL HYDROCHLORIDE 200 MG: 200 TABLET, FILM COATED ORAL at 21:28

## 2022-07-19 NOTE — ASSESSMENT & PLAN NOTE
Isolated SRBPs overnight not requiring treatment, continue monitoring today  Takes labetalol 200mg BID  CBC, CMP wnl, P:C  22  Denies signs/symptoms PreE

## 2022-07-19 NOTE — H&P
[address] [facility] 23 y o  female MRN: [id number]  Unit/Bed#: LD TRIAGE 1-01 Encounter: [id number]    Assessment: 23 y o  Jordan Doss at 37w1d admitted for IOL for cHTN  SVE: 3/50/-2  FHT: 135 bpm/ moderate variability, 15x15 accels present, decelerations absent  Clinical EFW: 7 ; Cephalic confirmed by Ultrasound  GBS status: Positive   Postpartum contraception plan: Vasectomy for FOB    Plan:   Pregnancy with abnormal glucose tolerance test (GTT)  Assessment & Plan  1 hr GTT abnormal   3 hr GTT declined, patient monitored BG for 1 week and reviewed with OB    Positive GBS test  Assessment & Plan  Will start PCN    Susceptible to varicella (non-immune), currently pregnant  Assessment & Plan  Post-partum varivax    Chronic hypertension affecting pregnancy  Assessment & Plan  Pressures today wnl  Takes labetalol 200mg BID  CBC, CMP, P:C ordered  Denies signs/symptoms PreE    Iron deficiency anemia of mother during pregnancy  Assessment & Plan  Hgb 9 7 on admission  T&C 2 units  Has received iron infusions throughout pregnancy    * 37 weeks gestation of pregnancy  Assessment & Plan  Admit   T&S, CBC, RPR  CLD  IV fluids  GBS prophylaxis is needed, PCN ordered  Induction with Pitocin titration        Discussed case and plan w/ Dr Dionisio Rodriguez      Chief Complaint: IOL for cHTN    HPI: Scott Mayers is a 23 y o  Jordan Doss with an BEATRIZ of 8/8/2022, by Last Menstrual Period at 37w1d who is being admitted for IOL for cHTN  She denies having uterine contractions, has no LOF, and reports no VB  She states she has felt good GIGI Null     Patient Active Problem List   Diagnosis    Obesity    Vitiligo    Essential hypertension    Positive KEELY (antinuclear antibody)    Abnormal results of thyroid function studies    Iron deficiency anemia of mother during pregnancy    Obesity affecting pregnancy in third trimester    Chronic hypertension affecting pregnancy    37 weeks gestation of pregnancy    Short interval between pregnancies affecting pregnancy in third trimester, antepartum    Susceptible to varicella (non-immune), currently pregnant    Positive GBS test    Pregnancy with abnormal glucose tolerance test (GTT)       Baby complications/comments: none    Review of Systems   Constitutional: Negative for chills and fever  Respiratory: Negative for cough and shortness of breath  Cardiovascular: Negative for chest pain and leg swelling  Gastrointestinal: Negative for abdominal pain, nausea and vomiting  Genitourinary: Negative for dysuria, pelvic pain, urgency, vaginal bleeding and vaginal discharge  Neurological: Negative for dizziness, light-headedness and headaches  All other systems reviewed and are negative        OB Hx:  OB History    Para Term  AB Living   2 1 1     1   SAB IAB Ectopic Multiple Live Births         0 1      # Outcome Date GA Lbr Mac/2nd Weight Sex Delivery Anes PTL Lv   2 Current            1 Term 21 37w1d / 00:33 3535 g (7 lb 12 7 oz) M Vag-Spont EPI N FRIEDA      Complications: Anemia      Obstetric Comments   Chronic HTN, anemia receive iron infusions        Past Medical Hx:  Past Medical History:   Diagnosis Date    Anemia     with pregnancy , receive iron infusions    Anxiety     Chronic hip pain, left     Chronic hypertension with superimposed preeclampsia 2903    Complication of anesthesia     pt reports required" more anesthesia "for her oral surgery and hand surgery to be effective    Congenital pes planus of both feet     Resolved      COVID-19     with pregnancy around 2020    Difficulty walking     Resolved 2017 , secondary to hip left    Eczema     Hypertension     chronic HTN     Mass of hand, left     Resolved 2017 , benign     Obesity 2016    Pain     chronic back pain     (spontaneous vaginal delivery) 2021    Varicella     vaccine    Visual impairment     eyewear       Past Surgical hx:  Past Surgical History:   Procedure Laterality Date    FL INJECTION LEFT HIP (ARTHROGRAM)  11/30/2018    HAND SURGERY Left     WISDOM TOOTH EXTRACTION         Social Hx:  Alcohol use: absent  Tobacco use: absent  Other substance use: absent    No Known Allergies    Medications Prior to Admission   Medication    diphenhydrAMINE HCl, Sleep, (Unisom SleepMelts) 25 MG TBDP    labetalol (NORMODYNE) 200 mg tablet    loratadine (CLARITIN) 10 mg tablet    Prenatal MV & Min w/FA-DHA (Prenatal Adult Gummy/DHA/FA) 0 4-25 MG CHEW    Butalbital-APAP-Caffeine (Fioricet) -40 MG CAPS       Objective:  Temp:  [98 3 °F (36 8 °C)] 98 3 °F (36 8 °C)  HR:  [90-95] 95  Resp:  [20] 20  BP: (111-134)/(60-72) 134/72  Body mass index is 46 63 kg/m²  Physical Exam:  Physical Exam  Constitutional:       Appearance: Normal appearance  Cardiovascular:      Rate and Rhythm: Normal rate and regular rhythm  Heart sounds: No murmur heard  No friction rub  No gallop  Pulmonary:      Effort: Pulmonary effort is normal  No respiratory distress  Breath sounds: No wheezing  Abdominal:      Palpations: Abdomen is soft  Tenderness: There is no abdominal tenderness  Musculoskeletal:         General: No swelling or tenderness  Neurological:      Mental Status: She is alert and oriented to person, place, and time  Skin:     General: Skin is warm and dry  Psychiatric:         Mood and Affect: Mood normal    Vitals reviewed              FHT:  Baseline Rate: 135 bpm  FHR Category: Category I    TOCO:   Contraction Frequency (minutes): Irregular  Contraction Duration (seconds):   Contraction Quality: Mild    Lab Results   Component Value Date    WBC 9 83 07/19/2022    HGB 9 7 (L) 07/19/2022    HCT 30 3 (L) 07/19/2022     07/19/2022     Lab Results   Component Value Date    K 3 7 02/01/2022     02/01/2022    CO2 25 02/01/2022    BUN 8 02/01/2022    CREATININE 0 88 02/01/2022    GLUCOSE 129 12/12/2020 GLUCOSE 112 12/12/2020    AST 14 02/01/2022    ALT 21 02/01/2022     Prenatal Labs: Reviewed      Blood type: A Positive  Antibody: Negative  GBS: Positive  HIV: Non-reactive  Rubella: Immune  VDRL/RPR: Non reactive  HBsAg: Negative  Chlamydia: Negative  Gonorrhea: Negative  Diabetes 1 hour screen: 151  3 hour glucose: declined by patient  Platelets: 071  Hgb: 9 7  >2 Midnights  INPATIENT     Signature/Title:  Yeyo More MD  Date: 7/19/2022  Time: 8:41 AM

## 2022-07-19 NOTE — ASSESSMENT & PLAN NOTE
Lochia WNL   Recovering well   Appropriate bowel and bladder function   Pain well controlled   Tolerating diet   Bottlefeeding  Ambulating without issues   No lower extremity tenderness  GBS Positive, adequately treated   Rh positive  Vasectomy for contraception

## 2022-07-19 NOTE — ANESTHESIA PROCEDURE NOTES
Epidural Block    Patient location during procedure: OB  Start time: 7/19/2022 6:43 PM  Reason for block: procedure for pain and at surgeon's request  Staffing  Performed: CRNA   Anesthesiologist: Heath Duran MD  Resident/CRNA: Mariann Morris CRNA  Preanesthetic Checklist  Completed: patient identified, IV checked, site marked, risks and benefits discussed, surgical consent, monitors and equipment checked, pre-op evaluation and timeout performed  Epidural  Patient position: sitting  Prep: ChloraPrep  Patient monitoring: cardiac monitor and frequent blood pressure checks  Approach: midline  Location: lumbar  Injection technique: RENZO saline  Needle  Needle type: Tuohy   Needle gauge: 18 G  Catheter type: side hole  Catheter size: 20 G  Catheter at skin depth: 12 cm  Catheter securement method: liquid medical adhesive  Test dose: negative  Assessment  Number of attempts: 1negative aspiration for CSF, negative aspiration for heme and no paresthesia on injection  patient tolerated the procedure well with no immediate complications

## 2022-07-19 NOTE — ANESTHESIA PREPROCEDURE EVALUATION
Procedure:  LABOR ANALGESIA    Relevant Problems   ANESTHESIA (within normal limits)   HTN   obesity  Physical Exam    Airway    Mallampati score: III         Dental       Cardiovascular      Pulmonary      Other Findings        Anesthesia Plan  ASA Score- 3     Anesthesia Type- epidural with ASA Monitors  Additional Monitors:   Airway Plan:           Plan Factors-Exercise tolerance (METS): >4 METS  Chart reviewed  Existing labs reviewed  Patient summary reviewed  Patient did not smoke on day of surgery  Induction-     Postoperative Plan-     Informed Consent- Anesthetic plan and risks discussed with patient  I personally reviewed this patient with the CRNA  Discussed and agreed on the Anesthesia Plan with the WENDI Kim

## 2022-07-19 NOTE — PLAN OF CARE
Problem: Knowledge Deficit  Goal: Verbalizes understanding of labor plan  Description: Assess patient/family/caregiver's baseline knowledge level and ability to understand information  Provide education via patient/family/caregiver's preferred learning method at appropriate level of understanding  1  Provide teaching at level of understanding  2  Provide teaching via preferred learning method(s)  Outcome: Progressing  Goal: Patient/family/caregiver demonstrates understanding of disease process, treatment plan, medications, and discharge instructions  Description: Complete learning assessment and assess knowledge base  Interventions:  - Provide teaching at level of understanding  - Provide teaching via preferred learning methods  Outcome: Progressing     Problem: Labor & Delivery  Goal: Manages discomfort  Description: Assess and monitor for signs and symptoms of discomfort  Assess patient's pain level regularly and per hospital policy  Administer medications as ordered  Support use of nonpharmacological methods to help control pain such as distraction, imagery, relaxation, and application of heat and cold  Collaborate with interdisciplinary team and patient to determine appropriate pain management plan  1  Include patient in decisions related to comfort  2  Offer non-pharmacological pain management interventions  3  Report ineffective pain management to physician  Outcome: Progressing  Goal: Patient vital signs are stable  Description: 1  Assess vital signs - vaginal delivery    Outcome: Progressing     Problem: PAIN - ADULT  Goal: Verbalizes/displays adequate comfort level or baseline comfort level  Description: Interventions:  - Encourage patient to monitor pain and request assistance  - Assess pain using appropriate pain scale  - Administer analgesics based on type and severity of pain and evaluate response  - Implement non-pharmacological measures as appropriate and evaluate response  - Consider cultural and social influences on pain and pain management  - Notify physician/advanced practitioner if interventions unsuccessful or patient reports new pain  Outcome: Progressing     Problem: INFECTION - ADULT  Goal: Absence or prevention of progression during hospitalization  Description: INTERVENTIONS:  - Assess and monitor for signs and symptoms of infection  - Monitor lab/diagnostic results  - Monitor all insertion sites, i e  indwelling lines, tubes, and drains  - Monitor endotracheal if appropriate and nasal secretions for changes in amount and color  - [place] appropriate cooling/warming therapies per order  - Administer medications as ordered  - Instruct and encourage patient and family to use good hand hygiene technique  - Identify and instruct in appropriate isolation precautions for identified infection/condition  Outcome: Progressing  Goal: Absence of fever/infection during neutropenic period  Description: INTERVENTIONS:  - Monitor WBC    Outcome: Progressing     Problem: SAFETY ADULT  Goal: Patient will remain free of falls  Description: INTERVENTIONS:  - Educate patient/family on patient safety including physical limitations  - Instruct patient to call for assistance with activity   - Consult OT/PT to assist with strengthening/mobility   - Keep Call bell within reach  - Keep bed low and locked with side rails adjusted as appropriate  - Keep care items and personal belongings within reach  - Initiate and maintain comfort rounds  - Make Fall Risk Sign visible to staff  - - Apply yellow socks and bracelet for high fall risk patients  - Consider moving patient to room near nurses station  Outcome: Progressing  Goal: Maintain or return to baseline ADL function  Description: INTERVENTIONS:  -  Assess patient's ability to carry out ADLs; assess patient's baseline for ADL function and identify physical deficits which impact ability to perform ADLs (bathing, care of mouth/teeth, toileting, grooming, dressing, etc )  - Assess/evaluate cause of self-care deficits   - Assess range of motion  - Assess patient's mobility; develop plan if impaired  - Assess patient's need for assistive devices and provide as appropriate  - Encourage maximum independence but intervene and supervise when necessary  - Involve family in performance of ADLs  - Assess for home care needs following discharge   - Consider OT consult to assist with ADL evaluation and planning for discharge  - Provide patient education as appropriate  Outcome: Progressing  Goal: Maintains/Returns to pre admission functional level  Description: INTERVENTIONS:  - Perform BMAT or MOVE assessment daily    - Set and communicate daily mobility goal to care team and patient/family/caregiver     - Collaborate with rehabilitation services on mobility goals if consulted  - - Out of bed for toileting  - Record patient progress and toleration of activity level   Outcome: Progressing     Problem: DISCHARGE PLANNING  Goal: Discharge to home or other facility with appropriate resources  Description: INTERVENTIONS:  - Identify barriers to discharge w/patient and caregiver  - Arrange for needed discharge resources and transportation as appropriate  - Identify discharge learning needs (meds, wound care, etc )  - Arrange for interpretive services to assist at discharge as needed  - Refer to Case Management Department for coordinating discharge planning if the patient needs post-hospital services based on physician/advanced practitioner order or complex needs related to functional status, cognitive ability, or social support system  Outcome: Progressing

## 2022-07-19 NOTE — PLAN OF CARE
Problem: Knowledge Deficit  Goal: Verbalizes understanding of labor plan  Description: Assess patient/family/caregiver's baseline knowledge level and ability to understand information  Provide education via patient/family/caregiver's preferred learning method at appropriate level of understanding  1  Provide teaching at level of understanding  2  Provide teaching via preferred learning method(s)  Outcome: Progressing  Goal: Patient/family/caregiver demonstrates understanding of disease process, treatment plan, medications, and discharge instructions  Description: Complete learning assessment and assess knowledge base  Interventions:  - Provide teaching at level of understanding  - Provide teaching via preferred learning methods  Outcome: Progressing     Problem: Labor & Delivery  Goal: Manages discomfort  Description: Assess and monitor for signs and symptoms of discomfort  Assess patient's pain level regularly and per hospital policy  Administer medications as ordered  Support use of nonpharmacological methods to help control pain such as distraction, imagery, relaxation, and application of heat and cold  Collaborate with interdisciplinary team and patient to determine appropriate pain management plan  1  Include patient in decisions related to comfort  2  Offer non-pharmacological pain management interventions  3  Report ineffective pain management to physician  Outcome: Progressing  Goal: Patient vital signs are stable  Description: 1  Assess vital signs - vaginal delivery    Outcome: Progressing     Problem: PAIN - ADULT  Goal: Verbalizes/displays adequate comfort level or baseline comfort level  Description: Interventions:  - Encourage patient to monitor pain and request assistance  - Assess pain using appropriate pain scale  - Administer analgesics based on type and severity of pain and evaluate response  - Implement non-pharmacological measures as appropriate and evaluate response  - Consider cultural and social influences on pain and pain management  - Notify physician/advanced practitioner if interventions unsuccessful or patient reports new pain  Outcome: Progressing     Problem: INFECTION - ADULT  Goal: Absence or prevention of progression during hospitalization  Description: INTERVENTIONS:  - Assess and monitor for signs and symptoms of infection  - Monitor lab/diagnostic results  - Monitor all insertion sites, i e  indwelling lines, tubes, and drains  - Monitor endotracheal if appropriate and nasal secretions for changes in amount and color  - [place] appropriate cooling/warming therapies per order  - Administer medications as ordered  - Instruct and encourage patient and family to use good hand hygiene technique  - Identify and instruct in appropriate isolation precautions for identified infection/condition  Outcome: Progressing  Goal: Absence of fever/infection during neutropenic period  Description: INTERVENTIONS:  - Monitor WBC    Outcome: Progressing     Problem: SAFETY ADULT  Goal: Patient will remain free of falls  Description: INTERVENTIONS:  - Educate patient/family on patient safety including physical limitations  - Instruct patient to call for assistance with activity   - Consult OT/PT to assist with strengthening/mobility   - Keep Call bell within reach  - Keep bed low and locked with side rails adjusted as appropriate  - Keep care items and personal belongings within reach  - Initiate and maintain comfort rounds  - Make Fall Risk Sign visible to staff  - Apply yellow socks and bracelet for high fall risk patients  - Consider moving patient to room near nurses station  Outcome: Progressing  Goal: Maintain or return to baseline ADL function  Description: INTERVENTIONS:  -  Assess patient's ability to carry out ADLs; assess patient's baseline for ADL function and identify physical deficits which impact ability to perform ADLs (bathing, care of mouth/teeth, toileting, grooming, dressing, etc )  - Assess/evaluate cause of self-care deficits   - Assess range of motion  - Assess patient's mobility; develop plan if impaired  - Assess patient's need for assistive devices and provide as appropriate  - Encourage maximum independence but intervene and supervise when necessary  - Involve family in performance of ADLs  - Assess for home care needs following discharge   - Consider OT consult to assist with ADL evaluation and planning for discharge  - Provide patient education as appropriate  Outcome: Progressing  Goal: Maintains/Returns to pre admission functional level  Description: INTERVENTIONS:  - Perform BMAT or MOVE assessment daily    - Set and communicate daily mobility goal to care team and patient/family/caregiver     - Collaborate with rehabilitation services on mobility goals if consulted  - Out of bed for toileting  - Record patient progress and toleration of activity level   Outcome: Progressing     Problem: DISCHARGE PLANNING  Goal: Discharge to home or other facility with appropriate resources  Description: INTERVENTIONS:  - Identify barriers to discharge w/patient and caregiver  - Arrange for needed discharge resources and transportation as appropriate  - Identify discharge learning needs (meds, wound care, etc )  - Arrange for interpretive services to assist at discharge as needed  - Refer to Case Management Department for coordinating discharge planning if the patient needs post-hospital services based on physician/advanced practitioner order or complex needs related to functional status, cognitive ability, or social support system  Outcome: Progressing

## 2022-07-19 NOTE — PLAN OF CARE
Problem: Knowledge Deficit  Goal: Verbalizes understanding of labor plan  Description: Assess patient/family/caregiver's baseline knowledge level and ability to understand information  Provide education via patient/family/caregiver's preferred learning method at appropriate level of understanding  1  Provide teaching at level of understanding  2  Provide teaching via preferred learning method(s)  Outcome: Progressing  Goal: Patient/family/caregiver demonstrates understanding of disease process, treatment plan, medications, and discharge instructions  Description: Complete learning assessment and assess knowledge base    Interventions:  - Provide teaching at level of understanding  - Provide teaching via preferred learning methods  Outcome: Progressing     Problem: PAIN - ADULT  Goal: Verbalizes/displays adequate comfort level or baseline comfort level  Description: Interventions:  - Encourage patient to monitor pain and request assistance  - Assess pain using appropriate pain scale  - Administer analgesics based on type and severity of pain and evaluate response  - Implement non-pharmacological measures as appropriate and evaluate response  - Consider cultural and social influences on pain and pain management  - Notify physician/advanced practitioner if interventions unsuccessful or patient reports new pain  Outcome: Progressing     Problem: INFECTION - ADULT  Goal: Absence or prevention of progression during hospitalization  Description: INTERVENTIONS:  - Assess and monitor for signs and symptoms of infection  - Monitor lab/diagnostic results  - Monitor all insertion sites, i e  indwelling lines, tubes, and drains  - Monitor endotracheal if appropriate and nasal secretions for changes in amount and color  - [place] appropriate cooling/warming therapies per order  - Administer medications as ordered  - Instruct and encourage patient and family to use good hand hygiene technique  - Identify and instruct in appropriate isolation precautions for identified infection/condition  Outcome: Progressing  Goal: Absence of fever/infection during neutropenic period  Description: INTERVENTIONS:  - Monitor WBC    Outcome: Progressing     Problem: SAFETY ADULT  Goal: Patient will remain free of falls  Description: INTERVENTIONS:  - Educate patient/family on patient safety including physical limitations  - Instruct patient to call for assistance with activity   - Consult OT/PT to assist with strengthening/mobility   - Keep Call bell within reach  - Keep bed low and locked with side rails adjusted as appropriate  - Keep care items and personal belongings within reach  - Initiate and maintain comfort rounds  - Make Fall Risk Sign visible to staff  - Apply yellow socks and bracelet for high fall risk patients  - Consider moving patient to room near nurses station  Outcome: Progressing  Goal: Maintain or return to baseline ADL function  Description: INTERVENTIONS:  -  Assess patient's ability to carry out ADLs; assess patient's baseline for ADL function and identify physical deficits which impact ability to perform ADLs (bathing, care of mouth/teeth, toileting, grooming, dressing, etc )  - Assess/evaluate cause of self-care deficits   - Assess range of motion  - Assess patient's mobility; develop plan if impaired  - Assess patient's need for assistive devices and provide as appropriate  - Encourage maximum independence but intervene and supervise when necessary  - Involve family in performance of ADLs  - Assess for home care needs following discharge   - Consider OT consult to assist with ADL evaluation and planning for discharge  - Provide patient education as appropriate  Outcome: Progressing  Goal: Maintains/Returns to pre admission functional level  Description: INTERVENTIONS:  - Perform BMAT or MOVE assessment daily    - Set and communicate daily mobility goal to care team and patient/family/caregiver     - Collaborate with rehabilitation services on mobility goals if consulted  - Out of bed for toileting  - Record patient progress and toleration of activity level   Outcome: Progressing     Problem: DISCHARGE PLANNING  Goal: Discharge to home or other facility with appropriate resources  Description: INTERVENTIONS:  - Identify barriers to discharge w/patient and caregiver  - Arrange for needed discharge resources and transportation as appropriate  - Identify discharge learning needs (meds, wound care, etc )  - Arrange for interpretive services to assist at discharge as needed  - Refer to Case Management Department for coordinating discharge planning if the patient needs post-hospital services based on physician/advanced practitioner order or complex needs related to functional status, cognitive ability, or social support system  Outcome: Progressing

## 2022-07-19 NOTE — ASSESSMENT & PLAN NOTE
Hgb 9 7 on admission  T&C 2 units  Ordered venofer  Has received iron infusions throughout pregnancy

## 2022-07-20 PROCEDURE — 99024 POSTOP FOLLOW-UP VISIT: CPT | Performed by: OBSTETRICS & GYNECOLOGY

## 2022-07-20 RX ADMIN — WITCH HAZEL 1 PAD: 500 SOLUTION RECTAL; TOPICAL at 00:27

## 2022-07-20 RX ADMIN — IBUPROFEN 600 MG: 600 TABLET, FILM COATED ORAL at 19:38

## 2022-07-20 RX ADMIN — DOCUSATE SODIUM 100 MG: 100 CAPSULE, LIQUID FILLED ORAL at 10:18

## 2022-07-20 RX ADMIN — LABETALOL HYDROCHLORIDE 200 MG: 200 TABLET, FILM COATED ORAL at 10:18

## 2022-07-20 RX ADMIN — LABETALOL HYDROCHLORIDE 200 MG: 200 TABLET, FILM COATED ORAL at 22:04

## 2022-07-20 RX ADMIN — DOCUSATE SODIUM 100 MG: 100 CAPSULE, LIQUID FILLED ORAL at 19:22

## 2022-07-20 RX ADMIN — Medication 1 APPLICATION: at 00:27

## 2022-07-20 RX ADMIN — IRON SUCROSE 200 MG: 20 INJECTION, SOLUTION INTRAVENOUS at 10:18

## 2022-07-20 RX ADMIN — HYDROCORTISONE 1 APPLICATION: 1 CREAM TOPICAL at 00:27

## 2022-07-20 NOTE — UTILIZATION REVIEW
Inpatient Admission Authorization Request   Notification of Maternity/Delivery &  Birth Information for Admission   SERVICING FACILITY:   [address]  Tax ID: [id number]  NPI: [id number]  Place of Service: Inpatient [address] [facility]  [address]  Place of Service Code: 24     ATTENDING PROVIDER:  Attending Name and NPI#: [facility] Rosina Pendleton [[id number]]  Address: Kendal Quevedo  [address]  Phone: [phone]     UTILIZATION REVIEW CONTACT:  Josefina Castanon, Utilization Review Supervisor  Network Utilization Review Department  Phone: [phone]  Fax [phone]  Email: [email or web address]     PHYSICIAN ADVISORY SERVICES:  FOR [id number] REVIEW - MEDICAL NECESSITY DENIAL  Phone: [phone]  Fax: [phone]  Email: [email or web address]     TYPE OF REQUEST:  Inpatient Status     ADMISSION INFORMATION:  ADMISSION DATE/TIME: 22  7:08 AM  PATIENT DIAGNOSIS CODE/DESCRIPTION:  Encounter for full-term uncomplicated delivery [U66] The encounter diagnosis was 36 weeks gestation of pregnancy  1  36 weeks gestation of pregnancy      DISCHARGE DATE/TIME: No discharge date for patient encounter  MOTHER AND  INFORMATION:  Mother: Jina Mendiola 2000   Delivering clinician: Yandy Stallings Nurse 1   OB History        2    Para   2    Term   2            AB        Living   2       SAB        IAB        Ectopic        Multiple   0    Live Births   2           Obstetric Comments   Chronic HTN, anemia receive iron infusions             Name & MRN:   Information for the patient's :  Odalis Murdock [[id number]]      Delivery Information:  Sex: male  Delivered 2022 10:10 PM by Vaginal, Spontaneous; Gestational Age: 42w4d    [place] Measurements:  Weight: 7 lb 6 2 oz (3350 g);   Height: 20"    APGAR 1 minute 5 minutes 10 minutes   Totals: 9 9       Birth Information: 23 y o  female MRN: [id number] Unit/Bed#: -01 Estimated Date of Delivery: 22  Birthweight: No birth weight on file  Gestational Age: <None> Delivery Type: Vaginal, Spontaneous    IMPORTANT INFORMATION:  Please contact Kylee Alvarado directly with any questions or concerns regarding this request  Department voicemails are confidential     Send requests for admission clinical reviews, concurrent reviews, approvals, and administrative denials due to lack of clinical to fax [phone]

## 2022-07-20 NOTE — PLAN OF CARE
Problem: PAIN - ADULT  Goal: Verbalizes/displays adequate comfort level or baseline comfort level  Description: Interventions:  - Encourage patient to monitor pain and request assistance  - Assess pain using appropriate pain scale  - Administer analgesics based on type and severity of pain and evaluate response  - Implement non-pharmacological measures as appropriate and evaluate response  - Consider cultural and social influences on pain and pain management  - Notify physician/advanced practitioner if interventions unsuccessful or patient reports new pain  Outcome: Progressing     Problem: INFECTION - ADULT  Goal: Absence or prevention of progression during hospitalization  Description: INTERVENTIONS:  - Assess and monitor for signs and symptoms of infection  - Monitor lab/diagnostic results  - Monitor all insertion sites, i e  indwelling lines, tubes, and drains  - Monitor endotracheal if appropriate and nasal secretions for changes in amount and color  - [place] appropriate cooling/warming therapies per order  - Administer medications as ordered  - Instruct and encourage patient and family to use good hand hygiene technique  - Identify and instruct in appropriate isolation precautions for identified infection/condition  Outcome: Progressing  Goal: Absence of fever/infection during neutropenic period  Description: INTERVENTIONS:  - Monitor WBC    Outcome: Progressing     Problem: SAFETY ADULT  Goal: Patient will remain free of falls  Description: INTERVENTIONS:  - Educate patient/family on patient safety including physical limitations  - Instruct patient to call for assistance with activity   - Consult OT/PT to assist with strengthening/mobility   - Keep Call bell within reach  - Keep bed low and locked with side rails adjusted as appropriate  - Keep care items and personal belongings within reach  - Initiate and maintain comfort rounds  -  Outcome: Progressing  Goal: Maintain or return to baseline ADL function  Description: INTERVENTIONS:  -  Assess patient's ability to carry out ADLs; assess patient's baseline for ADL function and identify physical deficits which impact ability to perform ADLs (bathing, care of mouth/teeth, toileting, grooming, dressing, etc )  - Assess/evaluate cause of self-care deficits   - Assess range of motion  - Assess patient's mobility; develop plan if impaired  - Assess patient's need for assistive devices and provide as appropriate  - Encourage maximum independence but intervene and supervise when necessary  - Involve family in performance of ADLs  - Assess for home care needs following discharge   - Consider OT consult to assist with ADL evaluation and planning for discharge  - Provide patient education as appropriate  Outcome: Progressing  Goal: Maintains/Returns to pre admission functional level  Description: INTERVENTIONS:  - Perform BMAT or MOVE assessment daily    - Set and communicate daily mobility goal to care team and patient/family/caregiver     - Collaborate with rehabilitation services on mobility goals if consulted    - Out of bed for toileting  - Record patient progress and toleration of activity level   Outcome: Progressing     Problem: DISCHARGE PLANNING  Goal: Discharge to home or other facility with appropriate resources  Description: INTERVENTIONS:  - Identify barriers to discharge w/patient and caregiver  - Arrange for needed discharge resources and transportation as appropriate  - Identify discharge learning needs (meds, wound care, etc )  - Arrange for interpretive services to assist at discharge as needed  - Refer to Case Management Department for coordinating discharge planning if the patient needs post-hospital services based on physician/advanced practitioner order or complex needs related to functional status, cognitive ability, or social support system  Outcome: Progressing     Problem: POSTPARTUM  Goal: Experiences normal postpartum course  Description: INTERVENTIONS:  - Monitor maternal vital signs  - Assess uterine involution and lochia  Outcome: Progressing  Goal: Appropriate maternal -  bonding  Description: INTERVENTIONS:  - Identify family support  - Assess for appropriate maternal/infant bonding   -Encourage maternal/infant bonding opportunities  - Referral to  or  as needed  Outcome: Progressing  Goal: Establishment of infant feeding pattern  Description: INTERVENTIONS:  - Assess breast/bottle feeding  - Refer to lactation as needed  Outcome: Progressing  Goal: Incision(s), wounds(s) or drain site(s) healing without S/S of infection  Description: INTERVENTIONS  - Assess and document dressing, incision, wound bed, drain sites and surrounding tissue  - Provide patient and family education  - Perform skin care/dressing changes every day  Outcome: Progressing

## 2022-07-20 NOTE — PLAN OF CARE
Problem: PAIN - ADULT  Goal: Verbalizes/displays adequate comfort level or baseline comfort level  Description: Interventions:  - Encourage patient to monitor pain and request assistance  - Assess pain using appropriate pain scale  - Administer analgesics based on type and severity of pain and evaluate response  - Implement non-pharmacological measures as appropriate and evaluate response  - Consider cultural and social influences on pain and pain management  - Notify physician/advanced practitioner if interventions unsuccessful or patient reports new pain  Outcome: Progressing     Problem: INFECTION - ADULT  Goal: Absence or prevention of progression during hospitalization  Description: INTERVENTIONS:  - Assess and monitor for signs and symptoms of infection  - Monitor lab/diagnostic results  - Monitor all insertion sites, i e  indwelling lines, tubes, and drains  - Monitor endotracheal if appropriate and nasal secretions for changes in amount and color  - [place] appropriate cooling/warming therapies per order  - Administer medications as ordered  - Instruct and encourage patient and family to use good hand hygiene technique  - Identify and instruct in appropriate isolation precautions for identified infection/condition  Outcome: Progressing  Goal: Absence of fever/infection during neutropenic period  Description: INTERVENTIONS:  - Monitor WBC    Outcome: Progressing     Problem: SAFETY ADULT  Goal: Patient will remain free of falls  Description: INTERVENTIONS:  - Educate patient/family on patient safety including physical limitations  - Instruct patient to call for assistance with activity   - Consult OT/PT to assist with strengthening/mobility   - Keep Call bell within reach  - Keep bed low and locked with side rails adjusted as appropriate  - Keep care items and personal belongings within reach  - Initiate and maintain comfort rounds  - Make Fall Risk Sign visible to staff  - Offer Toileting every  Hours, in advance of need  - Initiate/Maintain alarm  - Obtain necessary fall risk management equipment:   - Apply yellow socks and bracelet for high fall risk patients  - Consider moving patient to room near nurses station  Outcome: Progressing  Goal: Maintain or return to baseline ADL function  Description: INTERVENTIONS:  -  Assess patient's ability to carry out ADLs; assess patient's baseline for ADL function and identify physical deficits which impact ability to perform ADLs (bathing, care of mouth/teeth, toileting, grooming, dressing, etc )  - Assess/evaluate cause of self-care deficits   - Assess range of motion  - Assess patient's mobility; develop plan if impaired  - Assess patient's need for assistive devices and provide as appropriate  - Encourage maximum independence but intervene and supervise when necessary  - Involve family in performance of ADLs  - Assess for home care needs following discharge   - Consider OT consult to assist with ADL evaluation and planning for discharge  - Provide patient education as appropriate  Outcome: Progressing  Goal: Maintains/Returns to pre admission functional level  Description: INTERVENTIONS:  - Perform BMAT or MOVE assessment daily    - Set and communicate daily mobility goal to care team and patient/family/caregiver  - Collaborate with rehabilitation services on mobility goals if consulted  - Perform Range of Motion  times a day  - Reposition patient every  hours    - Dangle patient  times a day  - Stand patient  times a day  - Ambulate patient  times a day  - Out of bed to chair  times a day   - Out of bed for meals  times a day  - Out of bed for toileting  - Record patient progress and toleration of activity level   Outcome: Progressing     Problem: DISCHARGE PLANNING  Goal: Discharge to home or other facility with appropriate resources  Description: INTERVENTIONS:  - Identify barriers to discharge w/patient and caregiver  - Arrange for needed discharge resources and transportation as appropriate  - Identify discharge learning needs (meds, wound care, etc )  - Arrange for interpretive services to assist at discharge as needed  - Refer to Case Management Department for coordinating discharge planning if the patient needs post-hospital services based on physician/advanced practitioner order or complex needs related to functional status, cognitive ability, or social support system  Outcome: Progressing     Problem: POSTPARTUM  Goal: Experiences normal postpartum course  Description: INTERVENTIONS:  - Monitor maternal vital signs  - Assess uterine involution and lochia  Outcome: Progressing  Goal: Appropriate maternal -  bonding  Description: INTERVENTIONS:  - Identify family support  - Assess for appropriate maternal/infant bonding   -Encourage maternal/infant bonding opportunities  - Referral to  or  as needed  Outcome: Progressing  Goal: Establishment of infant feeding pattern  Description: INTERVENTIONS:  - Assess breast/bottle feeding  - Refer to lactation as needed  Outcome: Progressing  Goal: Incision(s), wounds(s) or drain site(s) healing without S/S of infection  Description: INTERVENTIONS  - Assess and document dressing, incision, wound bed, drain sites and surrounding tissue  - Provide patient and family education  - Perform skin care/dressing changes every   Outcome: Progressing

## 2022-07-20 NOTE — PLAN OF CARE
Problem: Knowledge Deficit  Goal: Verbalizes understanding of labor plan  Description: Assess patient/family/caregiver's baseline knowledge level and ability to understand information  Provide education via patient/family/caregiver's preferred learning method at appropriate level of understanding  1  Provide teaching at level of understanding  2  Provide teaching via preferred learning method(s)  7/19/2022 2241 by Thomas Castellanos RN  Outcome: Progressing  7/19/2022 1952 by Thomas Castellanos RN  Outcome: Progressing  Goal: Patient/family/caregiver demonstrates understanding of disease process, treatment plan, medications, and discharge instructions  Description: Complete learning assessment and assess knowledge base    Interventions:  - Provide teaching at level of understanding  - Provide teaching via preferred learning methods  7/19/2022 2241 by Thomas Castellanos RN  Outcome: Progressing  7/19/2022 1952 by Thomas Castlelanos RN  Outcome: Progressing     Problem: PAIN - ADULT  Goal: Verbalizes/displays adequate comfort level or baseline comfort level  Description: Interventions:  - Encourage patient to monitor pain and request assistance  - Assess pain using appropriate pain scale  - Administer analgesics based on type and severity of pain and evaluate response  - Implement non-pharmacological measures as appropriate and evaluate response  - Consider cultural and social influences on pain and pain management  - Notify physician/advanced practitioner if interventions unsuccessful or patient reports new pain  7/19/2022 2241 by Thomas Castellanos RN  Outcome: Progressing  7/19/2022 1952 by Thomas Castellanos RN  Outcome: Progressing     Problem: INFECTION - ADULT  Goal: Absence or prevention of progression during hospitalization  Description: INTERVENTIONS:  - Assess and monitor for signs and symptoms of infection  - Monitor lab/diagnostic results  - Monitor all insertion sites, i e  indwelling lines, tubes, and drains  - Monitor endotracheal if appropriate and nasal secretions for changes in amount and color  - [place] appropriate cooling/warming therapies per order  - Administer medications as ordered  - Instruct and encourage patient and family to use good hand hygiene technique  - Identify and instruct in appropriate isolation precautions for identified infection/condition  7/19/2022 2241 by Jessica Blanc RN  Outcome: Progressing  7/19/2022 1952 by Jessica Blanc RN  Outcome: Progressing  Goal: Absence of fever/infection during neutropenic period  Description: INTERVENTIONS:  - Monitor WBC    7/19/2022 2241 by Jessica Blanc RN  Outcome: Progressing  7/19/2022 1952 by Jessica Blanc RN  Outcome: Progressing     Problem: SAFETY ADULT  Goal: Patient will remain free of falls  Description: INTERVENTIONS:  - Educate patient/family on patient safety including physical limitations  - Instruct patient to call for assistance with activity   - Consult OT/PT to assist with strengthening/mobility   - Keep Call bell within reach  - Keep bed low and locked with side rails adjusted as appropriate  - Keep care items and personal belongings within reach  - Initiate and maintain comfort rounds  - Make Fall Risk Sign visible to staff  - - Apply yellow socks and bracelet for high fall risk patients  - Consider moving patient to room near nurses station  7/19/2022 2241 by Jessica Blanc RN  Outcome: Progressing  7/19/2022 1952 by Jessica Blanc RN  Outcome: Progressing  Goal: Maintain or return to baseline ADL function  Description: INTERVENTIONS:  -  Assess patient's ability to carry out ADLs; assess patient's baseline for ADL function and identify physical deficits which impact ability to perform ADLs (bathing, care of mouth/teeth, toileting, grooming, dressing, etc )  - Assess/evaluate cause of self-care deficits   - Assess range of motion  - Assess patient's mobility; develop plan if impaired  - Assess patient's need for assistive devices and provide as appropriate  - Encourage maximum independence but intervene and supervise when necessary  - Involve family in performance of ADLs  - Assess for home care needs following discharge   - Consider OT consult to assist with ADL evaluation and planning for discharge  - Provide patient education as appropriate  7/19/2022 2241 by Foy Habermann, RN  Outcome: Progressing  7/19/2022 1952 by Foy Habermann, RN  Outcome: Progressing  Goal: Maintains/Returns to pre admission functional level  Description: INTERVENTIONS:  - Perform BMAT or MOVE assessment daily    - Set and communicate daily mobility goal to care team and patient/family/caregiver     - Collaborate with rehabilitation services on mobility goals if consulted  - - Out of bed for toileting  - Record patient progress and toleration of activity level   7/19/2022 2241 by Foy Habermann, RN  Outcome: Progressing  7/19/2022 1952 by Foy Habermann, RN  Outcome: Progressing     Problem: DISCHARGE PLANNING  Goal: Discharge to home or other facility with appropriate resources  Description: INTERVENTIONS:  - Identify barriers to discharge w/patient and caregiver  - Arrange for needed discharge resources and transportation as appropriate  - Identify discharge learning needs (meds, wound care, etc )  - Arrange for interpretive services to assist at discharge as needed  - Refer to Case Management Department for coordinating discharge planning if the patient needs post-hospital services based on physician/advanced practitioner order or complex needs related to functional status, cognitive ability, or social support system  7/19/2022 2241 by Foy Habermann, RN  Outcome: Progressing  7/19/2022 1952 by Foy Habermann, RN  Outcome: Progressing

## 2022-07-20 NOTE — DISCHARGE INSTRUCTIONS
Vaginal Delivery   WHAT YOU SHOULD KNOW:   A vaginal delivery is the birth of your baby through your vagina (birth canal)  AFTER YOU LEAVE:   Medicines:  NSAIDs  help decrease swelling and pain or fever  This medicine is available with or without a doctor's order  NSAIDs can cause stomach bleeding or kidney problems in certain people  If you take blood thinner medicine, always ask your healthcare provider if NSAIDs are safe for you  Always read the medicine label and follow directions  Take your medicine as directed  Call your healthcare provider if you think your medicine is not helping or if you have side effects  Tell him if you are allergic to any medicine  Keep a list of the medicines, vitamins, and herbs you take  Include the amounts, and when and why you take them  Bring the list or the pill bottles to follow-up visits  Carry your medicine list with you in case of an emergency  Follow up with your primary healthcare provider:  Most women need to return 6 weeks after a vaginal delivery  Ask about how to care for your wounds or stitches  Write down your questions so you remember to ask them during your visits  Activity:  Rest as much as possible  Try to keep all activities short  You may be able to do some exercise soon after you have your baby  Talk with your primary healthcare provider before you start exercising  If you work outside the home, ask when you can return to your job  Kegel exercises:  Kegel exercises may help your vaginal and rectal muscles heal faster  You can do Kegel exercises by tightening and relaxing the muscles around your vagina  Kegel exercises help make the muscles stronger  Breast care:  When your milk comes in, your breasts may feel full and hard  Ask how to care for your breasts, even if you are not breastfeeding  Constipation:  Do not try to push the bowel movement out if it is too hard   High-fiber foods, extra liquids, and regular exercise can help you prevent constipation  Examples of high-fiber foods are fruit and bran  Prune juice and water are good liquids to drink  Regular exercise helps your digestive system work  You may also be told to take over-the-counter fiber and stool softener medicines  Take these items as directed  Hemorrhoids:  Pregnancy can cause severe hemorrhoids  You may have rectal pain because of the hemorrhoids  Ask how to prevent or treat hemorrhoids  Perineum care: Your perineum is the area between your vagina and anus  Keep the area clean and dry to help it heal and to prevent infection  Wash the area gently with soap and water when you bathe or shower  Rinse your perineum with warm water when you use the toilet  Your primary healthcare provider may suggest you use a warm sitz bath to help decrease pain  A sitz bath is a bathtub or basin filled to hip level  Stay in the sitz bath for 20 to 30 minutes, or as directed  Vaginal discharge: You will have vaginal discharge, called lochia, after your delivery  The lochia is bright red the first day or two after the birth  By the fourth day, the amount decreases, and it turns red-brown  Use a sanitary pad rather than a tampon to prevent a vaginal infection  It is normal to have lochia up to 8 weeks after your baby is born  Monthly periods: Your period may start again within 7 to 12 weeks after your baby is born  If you are breastfeeding, it may take longer for your period to start again  You can still get pregnant again even though you do not have your monthly period  Talk with your primary healthcare provider about a birth control method that will be good for you if you do not want to get pregnant  Mood changes: Many new mothers have some kind of mood changes after delivery  Some of these changes occur because of lack of sleep, hormone changes, and caring for a new baby  Some mood changes can be more serious, such as postpartum depression   Talk with your primary healthcare provider if you feel unable to care for yourself or your baby  Sexual activity:  You may need to avoid sex for 6 to 7 weeks after you have your baby  You may notice you have a decreased desire for sex, or sex may be painful  You may need to use a vaginal lubricant (gel) to help make sex more comfortable  Contact your primary healthcare provider if:   You have heavy vaginal bleeding that fills 1 or more sanitary pads in 1 hour  You have a fever  Your pain does not go away, or gets worse  The skin between your vagina and rectum is swollen, warm, or red  You have swollen, hard, or painful breasts  You feel very sad or depressed  You feel more tired than usual      You have questions or concerns about your condition or care  Seek care immediately or call 911 if:   You have pus or yellow drainage coming from your vagina or wound  You are urinating very little, or not at all  Your arm or leg feels warm, tender, and painful  It may look swollen and red  You feel lightheaded, have sudden and worsening chest pain, or trouble breathing  You may have more pain when you take deep breaths or cough, or you may cough up blood  © 2014 [address] is for End User's use only and may not be sold, redistributed or otherwise used for commercial purposes  All illustrations and images included in CareNotes® are the copyrighted property of [employer]  or José Patino  The above information is an  only  It is not intended as medical advice for individual conditions or treatments  Talk to your doctor, nurse or pharmacist before following any medical regimen to see if it is safe and effective for you  Postpartum Perineal Care   WHAT YOU NEED TO KNOW:   Postpartum perineal care is care for your perineum after you have a baby  The perineum is your vagina and anus     DISCHARGE INSTRUCTIONS:   Care for your perineum:  Healthcare providers will give you a small squirt bottle and show you how to use it  Do the following after you use the toilet and before you put on a new pad:  Remove the soiled pad    Use the squirt bottle to rinse your perineum from front to back while you sit on the toilet     Pat the area dry from front to back with toilet paper or a cotton cloth     Put on a fresh pad    Wash your hands  Decrease pain:  Ask your healthcare provider about these and other ways to decrease perineal pain:  Sitz baths:  Healthcare providers may give you a portable sitz bath  This is a small tub that fits in the toilet  Fill the sitz bath or bathtub with 4 to 6 inches of warm water  Sit in the warm water for 20 minutes 2 to 3 times a day  Ice:  Ice helps decrease swelling and pain  Ice may also help prevent tissue damage  Use an ice pack, or put crushed ice in a plastic bag  Cover it with a towel and place it on your perineum for 15 to 20 minutes every hour, or as directed  Medicine spray, wipes, or pads:  Healthcare providers may give you a medicine spray or wipes soaked with numbing medicine to decrease the pain  Pads that contain an herb called witch hazel may also help reduce pain  Use these after perineal care or a sitz bath  Follow up with your healthcare provider as directed:  Write down your questions so you remember to ask them during your visits  Contact your healthcare provider if:   You have heavy vaginal bleeding that fills 1 or more sanitary pads in 1 hour  You have foul-smelling vaginal discharge  You feel weak or lightheaded  You have questions or concerns about your condition or care  Seek care immediately or call 911 if:   You have large blood clots or bright red blood coming from your vagina  You have abdominal pain, vomiting, and a fever  © 2017 [address]  Information is for End User's use only and may not be sold, redistributed or otherwise used for commercial purposes   All illustrations and images included in [address] are the copyrighted property of [employer] M [employer]  or José Patino  The above information is an  only  It is not intended as medical advice for individual conditions or treatments  Talk to your doctor, nurse or pharmacist before following any medical regimen to see if it is safe and effective for you  Postpartum Depression   WHAT YOU NEED TO KNOW:   What is postpartum depression? Postpartum depression is a mood disorder that occurs after giving birth  A mood is an emotion or a feeling  Moods affect your behavior and how you feel about yourself and life in general  Depression is a sad mood that you cannot control  Women often feel sad, afraid, or nervous after their baby is born  These feelings are called postpartum blues or baby blues, and they usually go away in 1 to 2 weeks  With postpartum depression, these symptoms get worse and continue for more than 2 weeks  Postpartum depression is a serious condition that affects your daily activities and relationships  What causes postpartum depression? Healthcare providers do not know exactly what causes postpartum depression  It may be caused by a sudden drop in hormone levels after childbirth  A previous episode of postpartum depression or a family history of depression may increase your risk  Several things may trigger postpartum depression:  Lack of support from the baby's father or other family members    Feeling more tired than usual    Stress, a poor diet, or lack of sleep    Pain after childbirth or pain during breastfeeding    Sudden change in lifestyle  How is postpartum depression diagnosed? Postpartum depression affects your daily activities and your relationships with other people  Healthcare providers will ask you questions about your signs and symptoms and how they are affecting your life  The symptoms of postpartum depression usually begin within 1 month after childbirth   You feel depressed or lose interest in activities you enjoy nearly every day for at least 2 weeks  You also have 4 or more of the following symptoms: You feel tired or have less energy than usual      You feel unimportant or guilty most of the time  You think about hurting or killing yourself  Your appetite changes  You may lose your appetite and lose weight without trying  Your appetite may also increase and you may gain weight  You are restless, irritable, or withdrawn  You have trouble concentrating and remembering things  You have trouble doing daily tasks or making decisions  You have trouble sleeping, even after the baby is asleep  How is postpartum depression treated? Psychotherapy:  During therapy, you will talk with healthcare providers about how to cope with your feelings and moods  This can be done alone or in a group  It may also be done with family members or your partner  Antidepressants: This medicine is given to decrease or stop the symptoms of depression  You usually need to take antidepressants for several weeks before you begin to feel better  Do not stop taking antidepressants unless your healthcare provider tells you to  Healthcare providers may try a different antidepressant if one type does not work  What can I do to feel better? Rest:  Do not try to do everything all at the same time  Do only what is needed and let other things wait until later  Ask your family or friends for help, especially if you have other children  Ask your partner to help with night feedings or other baby care  Try to sleep when the baby naps  Get emotional support:  Share your feelings with your partner, a friend, or another mother  Take care of yourself:  Shower and dress each day  Do not skip meals  Try to get out of the house a little each day  Get regular exercise  Eat a healthy diet  Avoid alcohol because it can make your depression worse  Do not isolate yourself  Go for a walk or meet with a friend   It is also important that you have some time by yourself each day  How do I find support and more information? [address] [facility], Public Information & Communication Branch  [address], [address], [address]  [place] [address]   Phone: [phone]  Phone: [phone]  Web Address: [facility]  When should I contact my healthcare provider? You cannot make it to your next visit  Your depression does not get better with treatment or it gets worse  You have questions or concerns about your condition or care  When should I seek immediate care or call 911? You think about hurting or killing yourself, your baby, or someone else  You feel like other people want to hurt you  You hear voices telling you to hurt yourself or your baby  CARE AGREEMENT:   You have the right to help plan your care  Learn about your health condition and how it may be treated  Discuss treatment options with your caregivers to decide what care you want to receive  You always have the right to refuse treatment  The above information is an  only  It is not intended as medical advice for individual conditions or treatments  Talk to your doctor, nurse or pharmacist before following any medical regimen to see if it is safe and effective for you  ©  2600 [facility] Information is for End User's use only and may not be sold, redistributed or otherwise used for commercial purposes  All illustrations and images included in CareNotes® are the copyrighted property of [employer]  or José Patino  Postpartum Bleeding   WHAT YOU NEED TO KNOW:   Postpartum bleeding is vaginal bleeding after childbirth  This bleeding is normal, whether your baby was born vaginally or by   It contains blood and the tissue that lined the inside of your uterus when you were pregnant     DISCHARGE INSTRUCTIONS:   What to expect with postpartum bleeding:  Postpartum bleeding usually lasts at least 10 days, and may last longer than 6 weeks  Your bleeding may range from light (barely staining a pad) to heavy (soaking a pad in 1 hour)  Usually, you have heavier bleeding right after childbirth, which slows over the next few weeks until it stops  The bleeding is red or dark brown with clots for the first 1 to 3 days  It then turns pink for several days, and then becomes a white or yellow discharge until it ends  Follow up with your obstetrician as directed:  Do not have sex until your obstetrician says it is okay  Write down your questions so you remember to ask them during your visits  Contact your healthcare provider or obstetrician if:   Your bleeding increases, or you have heavy bleeding that soaks a pad in 1 hour for 2 hours in a row  You pass large blood clots  You are breathing faster than normal, or your heart is beating faster than normal     You are urinating less than usual, or not at all  You feel dizzy  You have questions or concerns about your condition or care  Seek immediate care or call 911 if:   You are suddenly short of breath and feel lightheaded  You have sudden chest pain  © 2017 [address]  Information is for End User's use only and may not be sold, redistributed or otherwise used for commercial purposes  All illustrations and images included in CareNotes® are the copyrighted property of [employer]  or José Patino  The above information is an  only  It is not intended as medical advice for individual conditions or treatments  Talk to your doctor, nurse or pharmacist before following any medical regimen to see if it is safe and effective for you  Breast Care for the Breast Feeding Mother   WHAT YOU SHOULD KNOW:   Your breasts will go through normal changes while you are breastfeeding  Sometimes breast and nipple problems can develop while you are breastfeeding   Learn about changes that are normal and those that may be a problem  Breast care can help you prevent and manage problems so you and your baby can enjoy the benefits of breastfeeding  AFTER YOU LEAVE:   Breast changes while you are breastfeeding: For the first few days after your baby is born, your body makes a small amount of breast milk (colostrum)  Within about 2 to 5 days, your body will begin making mature milk  It may take up to 10 days or longer for mature milk to come in  When your mature milk comes in, your breasts will become full and firm  They may feel tender  Breastfeeding your baby will decrease the full feeling in your breasts  You may feel a tingly sensation during feedings as milk is released from your breasts  This is called the milk let-down reflex  After 7 or more days, the fullness may feel like it has decreased  Your nipples should look the same as they did before you started breastfeeding  Breasts that feel full before and empty after breastfeeding are signs that breastfeeding is going well  Breast problems that can occur while you are breastfeeding:   Nipple soreness  may occur when you begin to breastfeed your baby  You may also have nipple soreness if your baby is not latched on to your breast correctly  Correct positioning and latch-on may decrease or stop the pain in your nipples  Work with your caregivers to help your baby latch on correctly  It may also be helpful to place warm, wet compresses on your nipples to help decrease pain  Plugged milk ducts  may cause painful breast lumps  Plugged ducts may be caused by not emptying your breasts completely during feedings  When your baby pauses during breastfeeding, massage and gently squeeze your breast  Gentle massage may unplug a blocked milk duct  Pump out any milk left in your breasts after your baby is done breastfeeding  Avoid wearing tight tops, tight bras, or under-wire bras, because they may put pressure on your breasts      Engorgement  may occur as your milk comes in soon after you begin breastfeeding  Engorgement may cause your breasts to become swollen and painful  Your breasts may also become engorged if you miss a feeding or you do not breastfeed on demand  The best way to decrease engorgement symptoms is to empty your breasts by feeding your baby often  Engorgement can make it hard for your baby to latch on to your breast  If this happens, express a small amount of milk and then have your baby latch on  Cold compresses, gel packs, or ice packs on your breasts can help decrease pain and swelling  Ask your caregiver how often and how long you should use cold, or ice packs  A breast infection called mastitis  can develop if you have plugged milk ducts or engorgement  Mastitis causes your breasts to become red, swollen, and painful  You may also have flu-like symptoms, such as chills and a fever  Place heat on your breasts to help decrease the pain  You may want to place a moist, warm cloth on the painful breast or both of your breasts  Ask how often to do this  Your primary healthcare provider [facility]) may suggest that you take an NSAID, such as ibuprofen, to decrease pain and swelling  He may also order antibiotics to treat mastitis  Ask about feeding your baby when you have a breast infection  How to help prevent or manage breast problems while you are breastfeeding:   Learn how to position your baby and latch him on correctly  To latch your baby correctly to your breast, make sure that his mouth covers most of your areola (dark area around your nipple)  He should not be attached only to the nipple  Your baby is latched on well if you feel comfortable and do not feel pain  A correct latch helps him get enough milk and can help to prevent sore nipples and other breast problems  There are several breastfeeding positions that you can try  Find the position that works best for you and your baby   Ask your caregiver for more information about how to hold and breastfeed your baby  Prevent biting  Your baby may get teeth at about 1to 3months of age  To help prevent biting, break his suction once he is finished or if he has fallen asleep  To break his suction, slip a finger into the side of his mouth  If your baby bites you, respond with surprise or unhappiness  Offer praise when he does not bite you  Breastfeed your baby regularly  Feed your baby 8 to 12 times a day  You may need to wake up your baby at night to feed him  It is okay to feed from 1 or both breasts at each feeding  Your baby should breastfeed from both breasts equally over the course of a day  If your baby only feeds from 1 side during a feeding, offer your other breast to him first for the next feeding  Schedule and keep follow-up visits  Talk to your baby's pediatrician or your PHP during follow-up visits if you have breast problems  Caregivers may suggest that you, or you and your partner, attend classes on breastfeeding  You also may want to join a breastfeeding support group  Caregivers may suggest that you see a lactation consultant  This is a caregiver who can help you with breastfeeding  Contact your PHP if:   You have a fever and chills  You have body aches and you feel like you do not have any energy  One or both of your breasts is red, swollen or hard, painful, and feels warm or hot  You have breast engorgement that does not get better within 24 hours  You see or feel a lump in your breast that hurts when you touch it  You have nipple pain during breastfeeding or between feedings  Your nipples are red, dry, cracked, or bleeding, or they have scabs on them  You have questions or concerns about your condition or care  © 2014 [address] is for End User's use only and may not be sold, redistributed or otherwise used for commercial purposes   All illustrations and images included in CareNotes® are the copyrighted property of A  [employer] M [employer]  or José Patino  The above information is an  only  It is not intended as medical advice for individual conditions or treatments  Talk to your doctor, nurse or pharmacist before following any medical regimen to see if it is safe and effective for you

## 2022-07-20 NOTE — L&D DELIVERY NOTE
DELIVERY NOTE  Torres Mari 23 y o  female MRN: [id number]  Unit/Bed#: -01 Encounter: [id number]    Obstetrician:    Dr Michelle Sandoval DO    Assistant:   Dr Maranda Martin    Pre-Delivery Diagnosis:   Patient Active Problem List   Diagnosis    Obesity    Vitiligo    Essential hypertension    Positive KEELY (antinuclear antibody)    Abnormal results of thyroid function studies    Iron deficiency anemia of mother during pregnancy    Obesity affecting pregnancy in third trimester    Chronic hypertension affecting pregnancy    37 weeks gestation of pregnancy    Short interval between pregnancies affecting pregnancy in third trimester, antepartum    Susceptible to varicella (non-immune), currently pregnant    Positive GBS test    Pregnancy with abnormal glucose tolerance test (GTT)     Post-Delivery Diagnosis:   Same as above - Delivered    Procedure:  Spontaneous vaginal delivery      Anesthesia:  epidural    Specimens:   Cord blood obtained   Placenta; normal appearing, central insertion, intact   Arterial and venous blood gases (below)     Gases:  Umbilical Cord Venous Blood Gas:  Results from last 7 days   Lab Units 22  2220   PH COV  7 398   PCO2 COV mm HG 35 4   HCO3 COV mmol/L 21 3   BASE EXC COV mmol/L -2 8*   O2 CT CD VB mL/dL 12 7   O2 HGB, VENOUS CORD % 24 5     Umbilical Cord Arterial Blood Gas:  Results from last 7 days   Lab Units 22  2220   PH COA  7 275   PCO2 COA  51 1   PO2 COA mm HG 20 0   HCO3 COA mmol/L 23 2   BASE EXC COA mmol/L -4 2*   O2 CONTENT CORD ART ml/dl 9 5   O2 HGB, ARTERIAL CORD % 43 4       Quantitative Blood Loss:   97 mL           Complications:    None    Brief Description of Labor Course:  Torres Mari is a 23 y o   female at 37w1d who was admitted to L&D for chronic hypertension  She was 4/60/-2 on initial cervical exam and her labor induction was started with Pitocin at 1445  She was artificially ruptured with clear fluid at 1802  She progressed to complete at 2208, pushed for 1 min, and delivered a healthy  at 1  Description of Delivery:   With  the assistance of maternal expulsive forces, the fetal vertex delivered spontaneously  A nuchal cord was  noted  The anterior shoulder was delivered atraumatically with gentle downward traction  The contralateral arm was delivered with gentle upward traction  The remainder of the fetus delivered spontaneously at 1, resulting in a viable male   Upon delivery, the infant was placed on the mothers abdomen and the cord was doubly clamped and cut after 30 seconds  The  was noted to have good tone and cry spontaneously  There was no evidence of injury  The  was passed off to  staff for evaluation  Umbilical cord blood and umbilical artery and venous gases were collected and sent to the lab  An intact placenta was delivered spontaneously at 2212 using fundal massage and gentle cord traction and was noted to have a centrally-inserted 3-vessel cord  Active management of the third stage of labor was undertaken with IV pitocin at 250 milliunits/min  Inspection of the perineum, vagina, labia, cervix, and urethra revealed a none laceration  Bleeding was noted to be under control  A bimanual exam was performed    Outcome:  Living  with APGARS 9 (1 min) and 9 (5 min)   weight: 7 lb 6 2oz    At the conclusion of the delivery, all needle, sponge, and instrument counts were noted to be correct  Patient tolerated the procedure well and was allowed to recover in labor and delivery room with family and  before being transferred to the post-partum floor  Conclusion:  Mother and baby are currently recovering nicely in stable condition  Attending Supervision:   Dr El Lara DO was present for the entire procedure      Franco Guillen DO  OB/GYN PGY-1   2022 10:24 PM

## 2022-07-20 NOTE — PROGRESS NOTES
Progress Note - OB/GYN  Gris Cardoza 23 y o  female MRN: [id number]  Unit/Bed#: -01 Encounter: [id number]    Assessment and Plan     Gris Cardoza is a patient of: [facility]  She is PPD# 1 s/p  spontaneous vaginal delivery  Recovering well and is stable  Monitoring blood pressures  Pregnancy with abnormal glucose tolerance test (GTT)  Assessment & Plan  1 hr GTT abnormal   3 hr GTT declined, patient monitored BG for 1 week and reviewed with OB    Susceptible to varicella (non-immune), currently pregnant  Assessment & Plan  Post-partum varivax ordered    Chronic hypertension affecting pregnancy  Assessment & Plan  Isolated SRBPs overnight not requiring treatment, continue monitoring today  Takes labetalol 200mg BID  CBC, CMP wnl, P:C  22  Denies signs/symptoms PreE    Iron deficiency anemia of mother during pregnancy  Assessment & Plan  Hgb 9 7 on admission  T&C 2 units  Ordered venofer  Has received iron infusions throughout pregnancy    *  (spontaneous vaginal delivery)  Assessment & Plan  Lochia WNL   Recovering well   Appropriate bowel and bladder function   Pain well controlled   Tolerating diet   Bottlefeeding  Ambulating without issues   No lower extremity tenderness  GBS Positive, adequately treated   Rh positive  Vasectomy for contraception         Disposition    - Anticipate discharge home on PPD# 1      Subjective/Objective     Chief Complaint: Postpartum State     Subjective:    Gris Cardoza is PPD#1 s/p  spontaneous vaginal delivery  She has no current complaints  Pain is well controlled  Patient is currently voiding  She is ambulating  Patient is currently passing flatus and has had no bowel movement  She is tolerating PO, and denies nausea or vomitting  Patient denies fever, chills, chest pain, shortness of breath, or calf tenderness  Lochia is normal  She is  Bottle feeding  She is recovering well and is stable         Vitals:   /58 (BP Location: Left arm) Pulse 90   Temp 97 9 °F (36 6 °C) (Oral)   Resp 18   Ht 5' 10" (1 778 m)   Wt (!) 147 kg (325 lb)   LMP 11/01/2021 (Exact Date)   SpO2 96%   Breastfeeding No   BMI 46 63 kg/m²       Intake/Output Summary (Last 24 hours) at 7/20/2022 0541  Last data filed at 7/19/2022 2257  Gross per 24 hour   Intake --   Output 847 ml   Net -847 ml       Invasive Devices  Timeline    Peripheral Intravenous Line  Duration           Peripheral IV 07/19/22 Left;Ventral (anterior) Hand <1 day                Physical Exam:   GEN: Cathie Enrique appears well, alert and oriented x 3, pleasant and cooperative   CARDIO: RRR, no murmurs or rubs  RESP:  CTAB, no wheezes or rales  ABDOMEN: soft, no tenderness, no distention, fundus @ U-3  EXTREMITIES: SCDs on, non tender, no erythema, b/l Pan's sign negative      Labs:     Hemoglobin   Date Value Ref Range Status   07/19/2022 9 7 (L) 11 5 - 15 4 g/dL Final   05/20/2022 10 4 (L) 11 5 - 15 4 g/dL Final     WBC   Date Value Ref Range Status   07/19/2022 9 83 4 31 - 10 16 Thousand/uL Final   05/20/2022 6 67 4 31 - 10 16 Thousand/uL Final     Platelets   Date Value Ref Range Status   07/19/2022 230 149 - 390 Thousands/uL Final   05/20/2022 228 149 - 390 Thousands/uL Final     Creatinine   Date Value Ref Range Status   07/19/2022 0 84 0 60 - 1 30 mg/dL Final     Comment:     Standardized to IDMS reference method   02/01/2022 0 88 0 60 - 1 30 mg/dL Final     Comment:     Standardized to IDMS reference method     AST   Date Value Ref Range Status   07/19/2022 16 13 - 39 U/L Final     Comment:     Specimen collection should occur prior to Sulfasalazine administration due to the potential for falsely depressed results  02/01/2022 14 5 - 45 U/L Final     Comment:     Specimen collection should occur prior to Sulfasalazine administration due to the potential for falsely depressed results      11/21/2020 22 0 - 40 IU/L Final   07/18/2019 22 0 - 40 IU/L Final     ALT   Date Value Ref Range Status   07/19/2022 9 7 - 52 U/L Final     Comment:     Specimen collection should occur prior to Sulfasalazine administration due to the potential for falsely depressed results  02/01/2022 21 12 - 78 U/L Final     Comment:     Specimen collection should occur prior to Sulfasalazine administration due to the potential for falsely depressed results      11/21/2020 16 0 - 32 IU/L Final   07/18/2019 16 0 - 32 IU/L Final          Ace Mercer MD  7/20/2022  5:41 AM

## 2022-07-20 NOTE — PLAN OF CARE
Problem: PAIN - ADULT  Goal: Verbalizes/displays adequate comfort level or baseline comfort level  Description: Interventions:  - Encourage patient to monitor pain and request assistance  - Assess pain using appropriate pain scale  - Administer analgesics based on type and severity of pain and evaluate response  - Implement non-pharmacological measures as appropriate and evaluate response  - Consider cultural and social influences on pain and pain management  - Notify physician/advanced practitioner if interventions unsuccessful or patient reports new pain  7/19/2022 2241 by Suzy Mcduffie RN  Outcome: Progressing  7/19/2022 1952 by Suzy Mcduffie RN  Outcome: Progressing     Problem: INFECTION - ADULT  Goal: Absence or prevention of progression during hospitalization  Description: INTERVENTIONS:  - Assess and monitor for signs and symptoms of infection  - Monitor lab/diagnostic results  - Monitor all insertion sites, i e  indwelling lines, tubes, and drains  - Monitor endotracheal if appropriate and nasal secretions for changes in amount and color  - [place] appropriate cooling/warming therapies per order  - Administer medications as ordered  - Instruct and encourage patient and family to use good hand hygiene technique  - Identify and instruct in appropriate isolation precautions for identified infection/condition  7/19/2022 2241 by Suzy Mcduffie RN  Outcome: Progressing  7/19/2022 1952 by Suzy Mcduffie RN  Outcome: Progressing  Goal: Absence of fever/infection during neutropenic period  Description: INTERVENTIONS:  - Monitor WBC    7/19/2022 2241 by Suzy Mcduffie RN  Outcome: Progressing  7/19/2022 1952 by Suzy Mcduffie RN  Outcome: Progressing     Problem: SAFETY ADULT  Goal: Patient will remain free of falls  Description: INTERVENTIONS:  - Educate patient/family on patient safety including physical limitations  - Instruct patient to call for assistance with activity   - Consult OT/PT to assist with strengthening/mobility   - Keep Call bell within reach  - Keep bed low and locked with side rails adjusted as appropriate  - Keep care items and personal belongings within reach  - Initiate and maintain comfort rounds  - Make Fall Risk Sign visible to staff  - - Apply yellow socks and bracelet for high fall risk patients  - Consider moving patient to room near nurses station  7/19/2022 2241 by Zak Howard RN  Outcome: Progressing  7/19/2022 1952 by Zak Howard RN  Outcome: Progressing  Goal: Maintain or return to baseline ADL function  Description: INTERVENTIONS:  -  Assess patient's ability to carry out ADLs; assess patient's baseline for ADL function and identify physical deficits which impact ability to perform ADLs (bathing, care of mouth/teeth, toileting, grooming, dressing, etc )  - Assess/evaluate cause of self-care deficits   - Assess range of motion  - Assess patient's mobility; develop plan if impaired  - Assess patient's need for assistive devices and provide as appropriate  - Encourage maximum independence but intervene and supervise when necessary  - Involve family in performance of ADLs  - Assess for home care needs following discharge   - Consider OT consult to assist with ADL evaluation and planning for discharge  - Provide patient education as appropriate  7/19/2022 2241 by Zak Howard RN  Outcome: Progressing  7/19/2022 1952 by Zak Howard RN  Outcome: Progressing  Goal: Maintains/Returns to pre admission functional level  Description: INTERVENTIONS:  - Perform BMAT or MOVE assessment daily    - Set and communicate daily mobility goal to care team and patient/family/caregiver     - Collaborate with rehabilitation services on mobility goals if consulted  - - Out of bed for toileting  - Record patient progress and toleration of activity level   7/19/2022 2241 by Zak Howard RN  Outcome: Progressing  7/19/2022 1952 by Zak Howard RN  Outcome: Progressing     Problem: DISCHARGE PLANNING  Goal: Discharge to home or other facility with appropriate resources  Description: INTERVENTIONS:  - Identify barriers to discharge w/patient and caregiver  - Arrange for needed discharge resources and transportation as appropriate  - Identify discharge learning needs (meds, wound care, etc )  - Arrange for interpretive services to assist at discharge as needed  - Refer to Case Management Department for coordinating discharge planning if the patient needs post-hospital services based on physician/advanced practitioner order or complex needs related to functional status, cognitive ability, or social support system  7/19/2022 2241 by Zoraida Oswald RN  Outcome: Progressing  7/19/2022 1952 by Zoraida Oswald, RN  Outcome: Progressing

## 2022-07-21 VITALS
OXYGEN SATURATION: 97 % | HEIGHT: 70 IN | TEMPERATURE: 97.5 F | HEART RATE: 75 BPM | BODY MASS INDEX: 41.95 KG/M2 | WEIGHT: 293 LBS | RESPIRATION RATE: 18 BRPM | DIASTOLIC BLOOD PRESSURE: 86 MMHG | SYSTOLIC BLOOD PRESSURE: 154 MMHG

## 2022-07-21 LAB
ABO GROUP BLD BPU: NORMAL
BPU ID: NORMAL
CROSSMATCH: NORMAL
UNIT DISPENSE STATUS: NORMAL
UNIT PRODUCT CODE: NORMAL
UNIT PRODUCT VOLUME: 300 ML
UNIT PRODUCT VOLUME: 300 ML
UNIT PRODUCT VOLUME: 350 ML
UNIT PRODUCT VOLUME: 350 ML
UNIT RH: NORMAL

## 2022-07-21 PROCEDURE — 99024 POSTOP FOLLOW-UP VISIT: CPT | Performed by: OBSTETRICS & GYNECOLOGY

## 2022-07-21 RX ORDER — ACETAMINOPHEN 325 MG/1
650 TABLET ORAL EVERY 4 HOURS PRN
Refills: 0
Start: 2022-07-21

## 2022-07-21 RX ORDER — IBUPROFEN 600 MG/1
600 TABLET ORAL EVERY 6 HOURS
Qty: 30 TABLET | Refills: 0
Start: 2022-07-21

## 2022-07-21 RX ORDER — DOCUSATE SODIUM 100 MG/1
100 CAPSULE, LIQUID FILLED ORAL 2 TIMES DAILY
Refills: 0
Start: 2022-07-21 | End: 2022-09-22

## 2022-07-21 RX ADMIN — DOCUSATE SODIUM 100 MG: 100 CAPSULE, LIQUID FILLED ORAL at 08:43

## 2022-07-21 RX ADMIN — LABETALOL HYDROCHLORIDE 200 MG: 200 TABLET, FILM COATED ORAL at 08:52

## 2022-07-21 RX ADMIN — ACETAMINOPHEN 650 MG: 325 TABLET ORAL at 08:43

## 2022-07-21 NOTE — PLAN OF CARE
Problem: PAIN - ADULT  Goal: Verbalizes/displays adequate comfort level or baseline comfort level  Description: Interventions:  - Encourage patient to monitor pain and request assistance  - Assess pain using appropriate pain scale  - Administer analgesics based on type and severity of pain and evaluate response  - Implement non-pharmacological measures as appropriate and evaluate response  - Consider cultural and social influences on pain and pain management  - Notify physician/advanced practitioner if interventions unsuccessful or patient reports new pain  7/21/2022 1658 by Cindi De Santiago RN  Outcome: Completed  7/21/2022 0930 by Cindi De Santiago RN  Outcome: Progressing     Problem: INFECTION - ADULT  Goal: Absence or prevention of progression during hospitalization  Description: INTERVENTIONS:  - Assess and monitor for signs and symptoms of infection  - Monitor lab/diagnostic results  - Monitor all insertion sites, i e  indwelling lines, tubes, and drains  - Monitor endotracheal if appropriate and nasal secretions for changes in amount and color  - [place] appropriate cooling/warming therapies per order  - Administer medications as ordered  - Instruct and encourage patient and family to use good hand hygiene technique  - Identify and instruct in appropriate isolation precautions for identified infection/condition  7/21/2022 1658 by Cindi De Santiago RN  Outcome: Completed  7/21/2022 0930 by Cindi De Santiago RN  Outcome: Progressing  Goal: Absence of fever/infection during neutropenic period  Description: INTERVENTIONS:  - Monitor WBC    7/21/2022 1658 by Cindi De Santiago RN  Outcome: Completed  7/21/2022 0930 by Cindi De Santiago RN  Outcome: Progressing     Problem: SAFETY ADULT  Goal: Patient will remain free of falls  Description: INTERVENTIONS:  - Educate patient/family on patient safety including physical limitations  - Instruct patient to call for assistance with activity   - Consult OT/PT to assist with strengthening/mobility   - Keep Call bell within reach  - Keep bed low and locked with side rails adjusted as appropriate  - Keep care items and personal belongings within reach  - Initiate and maintain comfort rounds  - Make Fall Risk Sign visible to staff  - Apply yellow socks and bracelet for high fall risk patients  - Consider moving patient to room near nurses station  7/21/2022 1658 by Cindi De Santiago RN  Outcome: Completed  7/21/2022 0930 by Cindi De Santiago RN  Outcome: Progressing  Goal: Maintain or return to baseline ADL function  Description: INTERVENTIONS:  -  Assess patient's ability to carry out ADLs; assess patient's baseline for ADL function and identify physical deficits which impact ability to perform ADLs (bathing, care of mouth/teeth, toileting, grooming, dressing, etc )  - Assess/evaluate cause of self-care deficits   - Assess range of motion  - Assess patient's mobility; develop plan if impaired  - Assess patient's need for assistive devices and provide as appropriate  - Encourage maximum independence but intervene and supervise when necessary  - Involve family in performance of ADLs  - Assess for home care needs following discharge   - Consider OT consult to assist with ADL evaluation and planning for discharge  - Provide patient education as appropriate  7/21/2022 1658 by Cindi De Santiago RN  Outcome: Completed  7/21/2022 0930 by Cindi De Santiago RN  Outcome: Progressing  Goal: Maintains/Returns to pre admission functional level  Description: INTERVENTIONS:  - Perform BMAT or MOVE assessment daily    - Set and communicate daily mobility goal to care team and patient/family/caregiver     - Collaborate with rehabilitation services on mobility goals if consulted  - Out of bed for toileting  - Record patient progress and toleration of activity level   7/21/2022 1658 by Cindi De Santiago RN  Outcome: Completed  7/21/2022 0930 by Cindi De Santiago RN  Outcome: Progressing     Problem: DISCHARGE PLANNING  Goal: Discharge to home or other facility with appropriate resources  Description: INTERVENTIONS:  - Identify barriers to discharge w/patient and caregiver  - Arrange for needed discharge resources and transportation as appropriate  - Identify discharge learning needs (meds, wound care, etc )  - Arrange for interpretive services to assist at discharge as needed  - Refer to Case Management Department for coordinating discharge planning if the patient needs post-hospital services based on physician/advanced practitioner order or complex needs related to functional status, cognitive ability, or social support system  2022 by Tej Dowling RN  Outcome: Completed  2022 by Tej Dowling RN  Outcome: Progressing     Problem: POSTPARTUM  Goal: Experiences normal postpartum course  Description: INTERVENTIONS:  - Monitor maternal vital signs  - Assess uterine involution and lochia  2022 by Tej Dowling RN  Outcome: Completed  2022 by Tej Dowling RN  Outcome: Progressing  Goal: Appropriate maternal -  bonding  Description: INTERVENTIONS:  - Identify family support  - Assess for appropriate maternal/infant bonding   -Encourage maternal/infant bonding opportunities  - Referral to  or  as needed  2022 by Tej Dowling RN  Outcome: Completed  2022 by Tej Dowling RN  Outcome: Progressing  Goal: Establishment of infant feeding pattern  Description: INTERVENTIONS:  - Assess breast/bottle feeding  - Refer to lactation as needed  2022 by Tej Dowling RN  Outcome: Completed  2022 by Tej Dowling RN  Outcome: Progressing  Goal: Incision(s), wounds(s) or drain site(s) healing without S/S of infection  Description: INTERVENTIONS  - Assess and document dressing, incision, wound bed, drain sites and surrounding tissue  - Provide patient and family education  7/21/2022 1658 by Anna Bonilla RN  Outcome: Completed  7/21/2022 0930 by Anna Bonilla RN  Outcome: Progressing

## 2022-07-21 NOTE — PLAN OF CARE
Problem: PAIN - ADULT  Goal: Verbalizes/displays adequate comfort level or baseline comfort level  Description: Interventions:  - Encourage patient to monitor pain and request assistance  - Assess pain using appropriate pain scale  - Administer analgesics based on type and severity of pain and evaluate response  - Implement non-pharmacological measures as appropriate and evaluate response  - Consider cultural and social influences on pain and pain management  - Notify physician/advanced practitioner if interventions unsuccessful or patient reports new pain  Outcome: Progressing     Problem: INFECTION - ADULT  Goal: Absence or prevention of progression during hospitalization  Description: INTERVENTIONS:  - Assess and monitor for signs and symptoms of infection  - Monitor lab/diagnostic results  - Monitor all insertion sites, i e  indwelling lines, tubes, and drains  - Monitor endotracheal if appropriate and nasal secretions for changes in amount and color  - [place] appropriate cooling/warming therapies per order  - Administer medications as ordered  - Instruct and encourage patient and family to use good hand hygiene technique  - Identify and instruct in appropriate isolation precautions for identified infection/condition  Outcome: Progressing  Goal: Absence of fever/infection during neutropenic period  Description: INTERVENTIONS:  - Monitor WBC    Outcome: Progressing     Problem: SAFETY ADULT  Goal: Patient will remain free of falls  Description: INTERVENTIONS:  - Educate patient/family on patient safety including physical limitations  - Instruct patient to call for assistance with activity   - Consult OT/PT to assist with strengthening/mobility   - Keep Call bell within reach  - Keep bed low and locked with side rails adjusted as appropriate  - Keep care items and personal belongings within reach  - Initiate and maintain comfort rounds  - Make Fall Risk Sign visible to staff  - Apply yellow socks and bracelet for high fall risk patients  - Consider moving patient to room near nurses station  Outcome: Progressing  Goal: Maintain or return to baseline ADL function  Description: INTERVENTIONS:  -  Assess patient's ability to carry out ADLs; assess patient's baseline for ADL function and identify physical deficits which impact ability to perform ADLs (bathing, care of mouth/teeth, toileting, grooming, dressing, etc )  - Assess/evaluate cause of self-care deficits   - Assess range of motion  - Assess patient's mobility; develop plan if impaired  - Assess patient's need for assistive devices and provide as appropriate  - Encourage maximum independence but intervene and supervise when necessary  - Involve family in performance of ADLs  - Assess for home care needs following discharge   - Consider OT consult to assist with ADL evaluation and planning for discharge  - Provide patient education as appropriate  Outcome: Progressing  Goal: Maintains/Returns to pre admission functional level  Description: INTERVENTIONS:  - Perform BMAT or MOVE assessment daily    - Set and communicate daily mobility goal to care team and patient/family/caregiver     - Collaborate with rehabilitation services on mobility goals if consulted  - Out of bed for toileting  - Record patient progress and toleration of activity level   Outcome: Progressing     Problem: DISCHARGE PLANNING  Goal: Discharge to home or other facility with appropriate resources  Description: INTERVENTIONS:  - Identify barriers to discharge w/patient and caregiver  - Arrange for needed discharge resources and transportation as appropriate  - Identify discharge learning needs (meds, wound care, etc )  - Arrange for interpretive services to assist at discharge as needed  - Refer to Case Management Department for coordinating discharge planning if the patient needs post-hospital services based on physician/advanced practitioner order or complex needs related to functional status, cognitive ability, or social support system  Outcome: Progressing     Problem: POSTPARTUM  Goal: Experiences normal postpartum course  Description: INTERVENTIONS:  - Monitor maternal vital signs  - Assess uterine involution and lochia  Outcome: Progressing  Goal: Appropriate maternal -  bonding  Description: INTERVENTIONS:  - Identify family support  - Assess for appropriate maternal/infant bonding   -Encourage maternal/infant bonding opportunities  - Referral to  or  as needed  Outcome: Progressing  Goal: Establishment of infant feeding pattern  Description: INTERVENTIONS:  - Assess breast/bottle feeding  - Refer to lactation as needed  Outcome: Progressing  Goal: Incision(s), wounds(s) or drain site(s) healing without S/S of infection  Description: INTERVENTIONS  - Assess and document dressing, incision, wound bed, drain sites and surrounding tissue  - Provide patient and family education  Outcome: Progressing

## 2022-07-21 NOTE — PLAN OF CARE
Problem: PAIN - ADULT  Goal: Verbalizes/displays adequate comfort level or baseline comfort level  Description: Interventions:  - Encourage patient to monitor pain and request assistance  - Assess pain using appropriate pain scale  - Administer analgesics based on type and severity of pain and evaluate response  - Implement non-pharmacological measures as appropriate and evaluate response  - Consider cultural and social influences on pain and pain management  - Notify physician/advanced practitioner if interventions unsuccessful or patient reports new pain  Outcome: Progressing     Problem: INFECTION - ADULT  Goal: Absence or prevention of progression during hospitalization  Description: INTERVENTIONS:  - Assess and monitor for signs and symptoms of infection  - Monitor lab/diagnostic results  - Monitor all insertion sites, i e  indwelling lines, tubes, and drains  - Monitor endotracheal if appropriate and nasal secretions for changes in amount and color  - [place] appropriate cooling/warming therapies per order  - Administer medications as ordered  - Instruct and encourage patient and family to use good hand hygiene technique  - Identify and instruct in appropriate isolation precautions for identified infection/condition  Outcome: Progressing  Goal: Absence of fever/infection during neutropenic period  Description: INTERVENTIONS:  - Monitor WBC    Outcome: Progressing     Problem: SAFETY ADULT  Goal: Patient will remain free of falls  Description: INTERVENTIONS:  - Educate patient/family on patient safety including physical limitations  - Instruct patient to call for assistance with activity   - Consult OT/PT to assist with strengthening/mobility   - Keep Call bell within reach  - Keep bed low and locked with side rails adjusted as appropriate  - Keep care items and personal belongings within reach  - Initiate and maintain comfort rounds  - Make Fall Risk Sign visible to staff  - Offer Toileting every  Hours, in advance of need  - Initiate/Maintain alarm  - Obtain necessary fall risk management equipment:   - Apply yellow socks and bracelet for high fall risk patients  - Consider moving patient to room near nurses station  Outcome: Progressing  Goal: Maintain or return to baseline ADL function  Description: INTERVENTIONS:  -  Assess patient's ability to carry out ADLs; assess patient's baseline for ADL function and identify physical deficits which impact ability to perform ADLs (bathing, care of mouth/teeth, toileting, grooming, dressing, etc )  - Assess/evaluate cause of self-care deficits   - Assess range of motion  - Assess patient's mobility; develop plan if impaired  - Assess patient's need for assistive devices and provide as appropriate  - Encourage maximum independence but intervene and supervise when necessary  - Involve family in performance of ADLs  - Assess for home care needs following discharge   - Consider OT consult to assist with ADL evaluation and planning for discharge  - Provide patient education as appropriate  Outcome: Progressing  Goal: Maintains/Returns to pre admission functional level  Description: INTERVENTIONS:  - Perform BMAT or MOVE assessment daily    - Set and communicate daily mobility goal to care team and patient/family/caregiver  - Collaborate with rehabilitation services on mobility goals if consulted  - Perform Range of Motion  times a day  - Reposition patient every  hours    - Dangle patient  times a day  - Stand patient  times a day  - Ambulate patient  times a day  - Out of bed to chair  times a day   - Out of bed for meals  times a day  - Out of bed for toileting  - Record patient progress and toleration of activity level   Outcome: Progressing     Problem: DISCHARGE PLANNING  Goal: Discharge to home or other facility with appropriate resources  Description: INTERVENTIONS:  - Identify barriers to discharge w/patient and caregiver  - Arrange for needed discharge resources and transportation as appropriate  - Identify discharge learning needs (meds, wound care, etc )  - Arrange for interpretive services to assist at discharge as needed  - Refer to Case Management Department for coordinating discharge planning if the patient needs post-hospital services based on physician/advanced practitioner order or complex needs related to functional status, cognitive ability, or social support system  Outcome: Progressing     Problem: POSTPARTUM  Goal: Experiences normal postpartum course  Description: INTERVENTIONS:  - Monitor maternal vital signs  - Assess uterine involution and lochia  Outcome: Progressing  Goal: Appropriate maternal -  bonding  Description: INTERVENTIONS:  - Identify family support  - Assess for appropriate maternal/infant bonding   -Encourage maternal/infant bonding opportunities  - Referral to  or  as needed  Outcome: Progressing  Goal: Establishment of infant feeding pattern  Description: INTERVENTIONS:  - Assess breast/bottle feeding  - Refer to lactation as needed  Outcome: Progressing  Goal: Incision(s), wounds(s) or drain site(s) healing without S/S of infection  Description: INTERVENTIONS  - Assess and document dressing, incision, wound bed, drain sites and surrounding tissue  - Provide patient and family education  - Perform skin care/dressing changes every   Outcome: Progressing

## 2022-07-25 LAB — PLACENTA IN STORAGE: NORMAL

## 2022-07-26 DIAGNOSIS — D50.9 IRON DEFICIENCY ANEMIA, UNSPECIFIED IRON DEFICIENCY ANEMIA TYPE: ICD-10-CM

## 2022-07-27 ENCOUNTER — APPOINTMENT (OUTPATIENT)
Dept: LAB | Facility: HOSPITAL | Age: 22
End: 2022-07-27
Payer: OTHER GOVERNMENT

## 2022-07-27 DIAGNOSIS — D50.9 IRON DEFICIENCY ANEMIA, UNSPECIFIED IRON DEFICIENCY ANEMIA TYPE: ICD-10-CM

## 2022-07-27 LAB
BASOPHILS # BLD AUTO: 0.02 THOUSANDS/ΜL (ref 0–0.1)
BASOPHILS NFR BLD AUTO: 0 % (ref 0–1)
EOSINOPHIL # BLD AUTO: 0.16 THOUSAND/ΜL (ref 0–0.61)
EOSINOPHIL NFR BLD AUTO: 2 % (ref 0–6)
ERYTHROCYTE [DISTWIDTH] IN BLOOD BY AUTOMATED COUNT: 13.8 % (ref 11.6–15.1)
HCT VFR BLD AUTO: 38.4 % (ref 34.8–46.1)
HGB BLD-MCNC: 11.8 G/DL (ref 11.5–15.4)
IMM GRANULOCYTES # BLD AUTO: 0.02 THOUSAND/UL (ref 0–0.2)
IMM GRANULOCYTES NFR BLD AUTO: 0 % (ref 0–2)
LYMPHOCYTES # BLD AUTO: 1.97 THOUSANDS/ΜL (ref 0.6–4.47)
LYMPHOCYTES NFR BLD AUTO: 30 % (ref 14–44)
MCH RBC QN AUTO: 26 PG (ref 26.8–34.3)
MCHC RBC AUTO-ENTMCNC: 30.7 G/DL (ref 31.4–37.4)
MCV RBC AUTO: 85 FL (ref 82–98)
MONOCYTES # BLD AUTO: 0.41 THOUSAND/ΜL (ref 0.17–1.22)
MONOCYTES NFR BLD AUTO: 6 % (ref 4–12)
NEUTROPHILS # BLD AUTO: 4.1 THOUSANDS/ΜL (ref 1.85–7.62)
NEUTS SEG NFR BLD AUTO: 62 % (ref 43–75)
NRBC BLD AUTO-RTO: 0 /100 WBCS
PLATELET # BLD AUTO: 332 THOUSANDS/UL (ref 149–390)
PMV BLD AUTO: 9.4 FL (ref 8.9–12.7)
RBC # BLD AUTO: 4.53 MILLION/UL (ref 3.81–5.12)
WBC # BLD AUTO: 6.68 THOUSAND/UL (ref 4.31–10.16)

## 2022-07-27 PROCEDURE — 85025 COMPLETE CBC W/AUTO DIFF WBC: CPT

## 2022-07-27 PROCEDURE — 36415 COLL VENOUS BLD VENIPUNCTURE: CPT

## 2022-08-11 ENCOUNTER — OFFICE VISIT (OUTPATIENT)
Dept: FAMILY MEDICINE CLINIC | Facility: CLINIC | Age: 22
End: 2022-08-11
Payer: OTHER GOVERNMENT

## 2022-08-11 DIAGNOSIS — M54.16 LUMBAR RADICULOPATHY: ICD-10-CM

## 2022-08-11 DIAGNOSIS — R00.0 TACHYCARDIA: ICD-10-CM

## 2022-08-11 DIAGNOSIS — G47.00 INSOMNIA, UNSPECIFIED TYPE: ICD-10-CM

## 2022-08-11 DIAGNOSIS — D50.9 IRON DEFICIENCY ANEMIA, UNSPECIFIED IRON DEFICIENCY ANEMIA TYPE: ICD-10-CM

## 2022-08-11 DIAGNOSIS — I10 ESSENTIAL HYPERTENSION: Primary | ICD-10-CM

## 2022-08-11 PROCEDURE — 99214 OFFICE O/P EST MOD 30 MIN: CPT | Performed by: FAMILY MEDICINE

## 2022-08-11 RX ORDER — TRAZODONE HYDROCHLORIDE 100 MG/1
100 TABLET ORAL
Qty: 90 TABLET | Refills: 1 | Status: SHIPPED | OUTPATIENT
Start: 2022-08-11 | End: 2022-09-22 | Stop reason: SDUPTHER

## 2022-08-11 RX ORDER — PROPRANOLOL HYDROCHLORIDE 20 MG/1
20 TABLET ORAL EVERY 12 HOURS SCHEDULED
Qty: 60 TABLET | Refills: 2 | Status: SHIPPED | OUTPATIENT
Start: 2022-08-11

## 2022-08-11 RX ORDER — BACLOFEN 10 MG/1
10 TABLET ORAL 3 TIMES DAILY
Qty: 90 TABLET | Refills: 6 | Status: SHIPPED | OUTPATIENT
Start: 2022-08-11 | End: 2022-09-22

## 2022-08-11 RX ORDER — MELOXICAM 15 MG/1
15 TABLET ORAL DAILY PRN
Qty: 30 TABLET | Refills: 0 | Status: SHIPPED | OUTPATIENT
Start: 2022-08-11

## 2022-08-11 NOTE — PROGRESS NOTES
Meg Rogers 2000 female MRN: [id number]    FAMILY PRACTICE OFFICE VISIT  [facility] - [address]      ASSESSMENT/PLAN  Meg Rogers is a 23 y o  female presents to the office for    1  Essential hypertension  Stop labetalol and will start the patient on propanolol 20 mg twice a day and bring back the patient for blood pressure check  - Lipid panel; Future  - Comprehensive metabolic panel; Future  - TSH, 3rd generation with Free T4 reflex; Future  - propranolol (INDERAL) 20 mg tablet; Take 1 tablet (20 mg total) by mouth every 12 (twelve) hours  Dispense: 60 tablet; Refill: 2    2  Iron deficiency anemia, unspecified iron deficiency anemia type  Current CBC up-to-date within normal levels after infusion given at the hospital    3  Tachycardia  - Lipid panel; Future  - TSH, 3rd generation with Free T4 reflex; Future  - propranolol (INDERAL) 20 mg tablet; Take 1 tablet (20 mg total) by mouth every 12 (twelve) hours  Dispense: 60 tablet; Refill: 2    4  Insomnia, unspecified type  Restarted on trazodone  - traZODone (DESYREL) 100 mg tablet; Take 1 tablet (100 mg total) by mouth daily at bedtime  Dispense: 90 tablet; Refill: 1    5  Lumbar radiculopathy   - baclofen 10 mg tablet; Take 1 tablet (10 mg total) by mouth 3 (three) times a day  Dispense: 90 tablet; Refill: 6  - meloxicam (Mobic) 15 mg tablet; Take 1 tablet (15 mg total) by mouth daily as needed for moderate pain  Dispense: 30 tablet; Refill: 0     BMI Counseling: Body mass index is 44 8 kg/m²  The BMI is above normal  Nutrition recommendations include decreasing fast food intake and consuming healthier snacks  Exercise recommendations include exercising 3-5 times per week  Rationale for BMI follow-up plan is due to patient being overweight or obese             Future Appointments   Date Time Provider Digna Issa   9/7/2022  1:00 PM MD CHRISTIN Marcus [place] [facility]   9/9/2022 10:15 AM [facility] Caden Parker Warholic, DO [place] [facility]          SUBJECTIVE  CC: Blood Pressure Check      HPI:  Tab Oro is a 23 y o  female who presents for a follow-up appointment  Patient has not been seen in almost a year and half  Patient just recently had her 2nd baby boy  Since then she has been switched to labetalol but prior to that was on propanolol very well controlled  Patient has a history of insomnia and was taking trazodone and wishes to restart  She also has a history of lumbar pain with muscle spasms and was taking medications to help alleviate those pains  Patient is currently not breastfeeding  Shows no signs of postpartum depression      Review of Systems   Constitutional: Negative for activity change, appetite change, chills, fatigue and fever  HENT: Negative for congestion  Respiratory: Negative for cough, chest tightness and shortness of breath  Cardiovascular: Negative for chest pain and leg swelling  Gastrointestinal: Negative for abdominal distention, abdominal pain, constipation, diarrhea, nausea and vomiting  Musculoskeletal: Positive for back pain and gait problem  All other systems reviewed and are negative        Historical Information   The patient history was reviewed as follows:  Past Medical History:   Diagnosis Date    Anemia     with pregnancy , receive iron infusions    Anxiety     Chronic hip pain, left     Chronic hypertension with superimposed preeclampsia 6886    Complication of anesthesia     pt reports required" more anesthesia "for her oral surgery and hand surgery to be effective    Congenital pes planus of both feet     Resolved      COVID-19     with pregnancy around 2020    Difficulty walking     Resolved 2017 , secondary to hip left    Eczema     Hypertension     chronic HTN     Mass of hand, left     Resolved 2017 , benign     Obesity 2016    Pain     chronic back pain     (spontaneous vaginal delivery) 6/1/2021    Varicella     vaccine    Visual impairment     eyewear         Medications:     Current Outpatient Medications:     baclofen 10 mg tablet, Take 1 tablet (10 mg total) by mouth 3 (three) times a day, Disp: 90 tablet, Rfl: 6    meloxicam (Mobic) 15 mg tablet, Take 1 tablet (15 mg total) by mouth daily as needed for moderate pain, Disp: 30 tablet, Rfl: 0    propranolol (INDERAL) 20 mg tablet, Take 1 tablet (20 mg total) by mouth every 12 (twelve) hours, Disp: 60 tablet, Rfl: 2    traZODone (DESYREL) 100 mg tablet, Take 1 tablet (100 mg total) by mouth daily at bedtime, Disp: 90 tablet, Rfl: 1    acetaminophen (TYLENOL) 325 mg tablet, Take 2 tablets (650 mg total) by mouth every 4 (four) hours as needed for mild pain, Disp: , Rfl: 0    docusate sodium (COLACE) 100 mg capsule, Take 1 capsule (100 mg total) by mouth 2 (two) times a day, Disp: , Rfl: 0    ibuprofen (MOTRIN) 600 mg tablet, Take 1 tablet (600 mg total) by mouth every 6 (six) hours, Disp: 30 tablet, Rfl: 0    loratadine (CLARITIN) 10 mg tablet, Take 10 mg by mouth daily, Disp: , Rfl:     Prenatal MV & Min w/FA-DHA (Prenatal Adult Gummy/DHA/FA) 0 4-25 MG CHEW, Chew 2 tablets daily, Disp: , Rfl:     No Known Allergies    OBJECTIVE  Vitals:   Vitals:    08/11/22 0830   BP: 140/80   Pulse: 102   Resp: 16   Temp: 98 °F (36 7 °C)   SpO2: 99%   Weight: (!) 142 kg (312 lb 3 2 oz)         Physical Exam  Vitals reviewed  Constitutional:       Appearance: She is well-developed  HENT:      Head: Normocephalic and atraumatic  Eyes:      Conjunctiva/sclera: Conjunctivae normal       Pupils: Pupils are equal, round, and reactive to light  Cardiovascular:      Rate and Rhythm: Normal rate and regular rhythm  Heart sounds: Normal heart sounds  Pulmonary:      Effort: Pulmonary effort is normal  No respiratory distress  Breath sounds: Normal breath sounds  Musculoskeletal:         General: Normal range of motion  Cervical back: Normal range of motion and neck supple  Skin:     General: Skin is warm  Capillary Refill: Capillary refill takes less than 2 seconds  Neurological:      Mental Status: She is alert and oriented to person, place, and time                      Humberto Uribe MD,   [facility]  8/13/2022

## 2022-08-13 VITALS
SYSTOLIC BLOOD PRESSURE: 140 MMHG | HEART RATE: 102 BPM | WEIGHT: 293 LBS | OXYGEN SATURATION: 99 % | BODY MASS INDEX: 44.8 KG/M2 | TEMPERATURE: 98 F | RESPIRATION RATE: 16 BRPM | DIASTOLIC BLOOD PRESSURE: 80 MMHG

## 2022-08-16 ENCOUNTER — TELEPHONE (OUTPATIENT)
Dept: OBGYN CLINIC | Facility: CLINIC | Age: 22
End: 2022-08-16

## 2022-08-16 NOTE — TELEPHONE ENCOUNTER
How are you feeling? Feeling well  Are you having any vaginal bleeding? Bleeding is gone   Have you had any problems voiding? No  Have you had any problems moving your bowels? No  Type of Delivery?   Vaginal delivery - any issues with healing? No     Baby's name: Missy Ruiz   How is the baby doing? Baby is doing well   Are you breast/bottle feeding? Bottle   How is that going? Good   Have you seen lactation consultant? No  Are you having any baby blues? No  Do you have any help at home?  and family members are helping     Follow up appointment scheduled :   Patient advised to call the office for abnormal bleeding, mastitis, infection, any signs of postpartum depression  Patient verbalized understanding

## 2022-09-09 ENCOUNTER — APPOINTMENT (OUTPATIENT)
Dept: LAB | Facility: AMBULARY SURGERY CENTER | Age: 22
End: 2022-09-09
Payer: OTHER GOVERNMENT

## 2022-09-09 ENCOUNTER — POSTPARTUM VISIT (OUTPATIENT)
Dept: OBGYN CLINIC | Facility: CLINIC | Age: 22
End: 2022-09-09

## 2022-09-09 VITALS — DIASTOLIC BLOOD PRESSURE: 88 MMHG | BODY MASS INDEX: 44.8 KG/M2 | HEIGHT: 70 IN | SYSTOLIC BLOOD PRESSURE: 144 MMHG

## 2022-09-09 DIAGNOSIS — I10 ESSENTIAL HYPERTENSION: ICD-10-CM

## 2022-09-09 DIAGNOSIS — Z3A.15 15 WEEKS GESTATION OF PREGNANCY: ICD-10-CM

## 2022-09-09 DIAGNOSIS — R00.0 TACHYCARDIA: ICD-10-CM

## 2022-09-09 PROBLEM — O09.893 SHORT INTERVAL BETWEEN PREGNANCIES AFFECTING PREGNANCY IN THIRD TRIMESTER, ANTEPARTUM: Status: RESOLVED | Noted: 2022-06-21 | Resolved: 2022-09-09

## 2022-09-09 PROBLEM — D50.9 IRON DEFICIENCY ANEMIA OF MOTHER DURING PREGNANCY: Status: RESOLVED | Noted: 2021-04-07 | Resolved: 2022-09-09

## 2022-09-09 PROBLEM — O10.919 CHRONIC HYPERTENSION AFFECTING PREGNANCY: Status: RESOLVED | Noted: 2022-02-11 | Resolved: 2022-09-09

## 2022-09-09 PROBLEM — O99.810 PREGNANCY WITH ABNORMAL GLUCOSE TOLERANCE TEST (GTT): Status: RESOLVED | Noted: 2022-07-19 | Resolved: 2022-09-09

## 2022-09-09 PROBLEM — O99.019 IRON DEFICIENCY ANEMIA OF MOTHER DURING PREGNANCY: Status: RESOLVED | Noted: 2021-04-07 | Resolved: 2022-09-09

## 2022-09-09 PROBLEM — Z28.39 SUSCEPTIBLE TO VARICELLA (NON-IMMUNE), CURRENTLY PREGNANT: Status: RESOLVED | Noted: 2022-07-19 | Resolved: 2022-09-09

## 2022-09-09 PROBLEM — O99.213 OBESITY AFFECTING PREGNANCY IN THIRD TRIMESTER: Status: RESOLVED | Noted: 2022-02-07 | Resolved: 2022-09-09

## 2022-09-09 PROBLEM — O09.899 SUSCEPTIBLE TO VARICELLA (NON-IMMUNE), CURRENTLY PREGNANT: Status: RESOLVED | Noted: 2022-07-19 | Resolved: 2022-09-09

## 2022-09-09 LAB
ALBUMIN SERPL BCP-MCNC: 3.4 G/DL (ref 3.5–5)
ALP SERPL-CCNC: 116 U/L (ref 46–116)
ALT SERPL W P-5'-P-CCNC: 30 U/L (ref 12–78)
ANION GAP SERPL CALCULATED.3IONS-SCNC: 3 MMOL/L (ref 4–13)
AST SERPL W P-5'-P-CCNC: 19 U/L (ref 5–45)
BILIRUB SERPL-MCNC: 0.5 MG/DL (ref 0.2–1)
BUN SERPL-MCNC: 17 MG/DL (ref 5–25)
CALCIUM ALBUM COR SERPL-MCNC: 9.3 MG/DL (ref 8.3–10.1)
CALCIUM SERPL-MCNC: 8.8 MG/DL (ref 8.3–10.1)
CHLORIDE SERPL-SCNC: 106 MMOL/L (ref 96–108)
CHOLEST SERPL-MCNC: 238 MG/DL
CO2 SERPL-SCNC: 27 MMOL/L (ref 21–32)
CREAT SERPL-MCNC: 1.09 MG/DL (ref 0.6–1.3)
GFR SERPL CREATININE-BSD FRML MDRD: 71 ML/MIN/1.73SQ M
GLUCOSE P FAST SERPL-MCNC: 83 MG/DL (ref 65–99)
HDLC SERPL-MCNC: 46 MG/DL
LDLC SERPL CALC-MCNC: 167 MG/DL (ref 0–100)
NONHDLC SERPL-MCNC: 192 MG/DL
POTASSIUM SERPL-SCNC: 4.5 MMOL/L (ref 3.5–5.3)
PROT SERPL-MCNC: 7.3 G/DL (ref 6.4–8.4)
SODIUM SERPL-SCNC: 136 MMOL/L (ref 135–147)
TRIGL SERPL-MCNC: 125 MG/DL
TSH SERPL DL<=0.05 MIU/L-ACNC: 2.89 UIU/ML (ref 0.45–4.5)

## 2022-09-09 PROCEDURE — 99024 POSTOP FOLLOW-UP VISIT: CPT | Performed by: OBSTETRICS & GYNECOLOGY

## 2022-09-09 PROCEDURE — 84443 ASSAY THYROID STIM HORMONE: CPT

## 2022-09-09 PROCEDURE — 80053 COMPREHEN METABOLIC PANEL: CPT

## 2022-09-09 PROCEDURE — 80061 LIPID PANEL: CPT

## 2022-09-09 PROCEDURE — 36415 COLL VENOUS BLD VENIPUNCTURE: CPT

## 2022-09-09 RX ORDER — NYSTATIN 100000 U/G
CREAM TOPICAL
COMMUNITY
Start: 2022-08-30

## 2022-09-09 NOTE — PROGRESS NOTES
Assessment/Plan     Normal postpartum exam      1  Contraception: vasectomy  2  Annual exam due in January  3  Increase activity as tolerated, may resume all normal activity  4  Anticipated return to work: 6 - 12 weeks post partum  5  Chronic htn - due for AM dose  Will f/u with PCP  Subjective   Chief Complaint   Patient presents with    Postpartum Care     Patient is here today for her postpartum follow up,  Vag dlvry 7/19, baby boy, 7lb 6 2oz  patient declines BC at this time  She states she is doing really well  Scott Mayers is a 23 y o  [id number] female who presents for a postpartum visit  Delivery Date: 7/19  Type of delivery: spontaneous vaginal delivery  Gestational Age at delivery: 37w1d   Anesthesia: epidural  Laceration: none  Gestational Diabetes: no  Pregnancy Complications: chronic HTN  Breast or formula: Bottle formula    Baby information:  Seeing pediatrician: yes  Complications: none      Bleeding no bleeding  Bowel function: normal  Bladder function: normal      Patient has notbeen sexually active  Desired contraception method is vasectomy - scheduled for end of month  Postpartum depression screening: negative   EPDS : 0    Last Pap : 2021 ; no abnormalities      The following portions of the patient's history were reviewed and updated as appropriate: allergies, current medications, past family history, past medical history, past social history, past surgical history and problem list       Current Outpatient Medications:     acetaminophen (TYLENOL) 325 mg tablet, Take 2 tablets (650 mg total) by mouth every 4 (four) hours as needed for mild pain, Disp: , Rfl: 0    baclofen 10 mg tablet, Take 1 tablet (10 mg total) by mouth 3 (three) times a day, Disp: 90 tablet, Rfl: 6    docusate sodium (COLACE) 100 mg capsule, Take 1 capsule (100 mg total) by mouth 2 (two) times a day, Disp: , Rfl: 0    ibuprofen (MOTRIN) 600 mg tablet, Take 1 tablet (600 mg total) by mouth every 6 (six) hours, Disp: 30 tablet, Rfl: 0    loratadine (CLARITIN) 10 mg tablet, Take 10 mg by mouth daily, Disp: , Rfl:     meloxicam (Mobic) 15 mg tablet, Take 1 tablet (15 mg total) by mouth daily as needed for moderate pain, Disp: 30 tablet, Rfl: 0    Prenatal MV & Min w/FA-DHA (Prenatal Adult Gummy/DHA/FA) 0 4-25 MG CHEW, Chew 2 tablets daily, Disp: , Rfl:     propranolol (INDERAL) 20 mg tablet, Take 1 tablet (20 mg total) by mouth every 12 (twelve) hours, Disp: 60 tablet, Rfl: 2    traZODone (DESYREL) 100 mg tablet, Take 1 tablet (100 mg total) by mouth daily at bedtime, Disp: 90 tablet, Rfl: 1    No Known Allergies    Review of Systems  Review of Systems   Constitutional: Negative for chills and fever  Respiratory: Negative for shortness of breath  Cardiovascular: Negative for chest pain and leg swelling  Gastrointestinal: Negative for abdominal distention, abdominal pain, constipation, nausea and vomiting  Genitourinary: Negative for dysuria, frequency, urgency, vaginal bleeding and vaginal discharge  Neurological: Negative for headaches  Objective      /88 (BP Location: Left arm, Patient Position: Sitting, Cuff Size: Large)   Ht 5' 10" (1 778 m)   LMP 11/01/2021 (Exact Date)   BMI 44 80 kg/m²     Physical Exam  Constitutional:       General: She is not in acute distress  Appearance: Normal appearance  She is well-developed  She is not ill-appearing  Pulmonary:      Effort: Pulmonary effort is normal  No respiratory distress  Neurological:      General: No focal deficit present  Mental Status: She is alert and oriented to person, place, and time  Psychiatric:         Mood and Affect: Mood normal          Behavior: Behavior normal          Thought Content: Thought content normal          Judgment: Judgment normal    Vitals and nursing note reviewed

## 2022-09-22 ENCOUNTER — OFFICE VISIT (OUTPATIENT)
Dept: FAMILY MEDICINE CLINIC | Facility: CLINIC | Age: 22
End: 2022-09-22
Payer: OTHER GOVERNMENT

## 2022-09-22 VITALS
TEMPERATURE: 98.5 F | DIASTOLIC BLOOD PRESSURE: 82 MMHG | RESPIRATION RATE: 16 BRPM | WEIGHT: 293 LBS | OXYGEN SATURATION: 98 % | HEIGHT: 70 IN | SYSTOLIC BLOOD PRESSURE: 130 MMHG | HEART RATE: 94 BPM | BODY MASS INDEX: 41.95 KG/M2

## 2022-09-22 DIAGNOSIS — G47.00 INSOMNIA, UNSPECIFIED TYPE: ICD-10-CM

## 2022-09-22 DIAGNOSIS — I10 ESSENTIAL HYPERTENSION: Primary | ICD-10-CM

## 2022-09-22 DIAGNOSIS — M54.16 LUMBAR RADICULOPATHY: ICD-10-CM

## 2022-09-22 PROCEDURE — 99214 OFFICE O/P EST MOD 30 MIN: CPT | Performed by: FAMILY MEDICINE

## 2022-09-22 RX ORDER — TRAZODONE HYDROCHLORIDE 50 MG/1
50 TABLET ORAL
Start: 2022-09-22

## 2022-09-22 NOTE — PROGRESS NOTES
Nelli Michelle 2000 female MRN: [id number]    FAMILY PRACTICE OFFICE VISIT  [facility] - [address]      ASSESSMENT/PLAN  Nelli Michelle is a 23 y o  female presents to the office for    Diagnoses and all orders for this visit:    Essential hypertension    Lumbar radiculopathy    Insomnia, unspecified type  -     traZODone (DESYREL) 50 mg tablet; Take 1 tablet (50 mg total) by mouth daily at bedtime     Reduce trazodone to 50 mg given drowsiness  Lumbar radiculopathy unfortunately patient cannot do physical therapy at this time given her 2 children  But once she is able to get some time recommend initiating  Continue on all medications  Hypertension stable monitor at home         Repeat eval in 6 months  Future Appointments   Date Time Provider Digna Issa   1/17/2023  2:30 PM [address] [place] [address],  [facility]          SUBJECTIVE  CC: Hypertension      HPI:  Nelli Michelle is a 23 y o  female who presents for a follow-up appointment  Patient has had a history of hypertension prior to her pregnancies  States that at home her blood pressures been stable  Has no side effects to this medication  Unfortunately her back is not improved  She states only worsened since giving birth  She has tried going back in her older medications with no improvement  Patient is not breastfeeding but is concerned about being drowsy 4 children  Feels like 100 mg of trazodone is too much at times it ma kes her sleep very well but she is concerned about missing something  Review of Systems   Constitutional: Negative for activity change, appetite change, chills, fatigue and fever  HENT: Negative for congestion  Respiratory: Negative for cough, chest tightness and shortness of breath  Cardiovascular: Negative for chest pain and leg swelling  Gastrointestinal: Negative for abdominal distention, abdominal pain, constipation, diarrhea, nausea and vomiting  Musculoskeletal: Positive for arthralgias, back pain and gait problem  All other systems reviewed and are negative        Historical Information   The patient history was reviewed as follows:  Past Medical History:   Diagnosis Date    Anemia     with pregnancy , receive iron infusions    Anxiety     Chronic hip pain, left     Chronic hypertension affecting pregnancy 2022    Chronic hypertension with superimposed preeclampsia 3/2/3637    Complication of anesthesia     pt reports required" more anesthesia "for her oral surgery and hand surgery to be effective    Congenital pes planus of both feet     Resolved      COVID-19     with pregnancy around 2020    Difficulty walking     Resolved 2017 , secondary to hip left    Eczema     Hypertension     chronic HTN     Mass of hand, left     Resolved 2017 , benign     Obesity 2016    Pain     chronic back pain     (spontaneous vaginal delivery) 2021     (spontaneous vaginal delivery) 2/15/2022    Varicella     vaccine    Visual impairment     eyewear         Medications:     Current Outpatient Medications:     acetaminophen (TYLENOL) 325 mg tablet, Take 2 tablets (650 mg total) by mouth every 4 (four) hours as needed for mild pain, Disp: , Rfl: 0    baclofen 10 mg tablet, Take 1 tablet (10 mg total) by mouth 3 (three) times a day, Disp: 90 tablet, Rfl: 6    ibuprofen (MOTRIN) 600 mg tablet, Take 1 tablet (600 mg total) by mouth every 6 (six) hours, Disp: 30 tablet, Rfl: 0    loratadine (CLARITIN) 10 mg tablet, Take 10 mg by mouth daily, Disp: , Rfl:     meloxicam (Mobic) 15 mg tablet, Take 1 tablet (15 mg total) by mouth daily as needed for moderate pain, Disp: 30 tablet, Rfl: 0    Prenatal MV & Min w/FA-DHA (Prenatal Adult Gummy/DHA/FA) 0 4-25 MG CHEW, Chew 2 tablets daily, Disp: , Rfl:     propranolol (INDERAL) 20 mg tablet, Take 1 tablet (20 mg total) by mouth every 12 (twelve) hours, Disp: 60 tablet, Rfl: 2    traZODone (DESYREL) 50 mg tablet, Take 1 tablet (50 mg total) by mouth daily at bedtime, Disp: , Rfl:     nystatin (MYCOSTATIN) 500,000 units/5 mL suspension, , Disp: , Rfl:     nystatin (MYCOSTATIN) cream, , Disp: , Rfl:     No Known Allergies    OBJECTIVE  Vitals:   Vitals:    09/22/22 1603   BP: 130/82   BP Location: Right arm   Patient Position: Sitting   Cuff Size: Large   Pulse: 94   Resp: 16   Temp: 98 5 °F (36 9 °C)   SpO2: 98%   Weight: (!) 147 kg (324 lb)   Height: 5' 10" (1 778 m)         Physical Exam  Vitals reviewed  Constitutional:       Appearance: Normal appearance  She is well-developed  HENT:      Head: Normocephalic and atraumatic  Eyes:      Conjunctiva/sclera: Conjunctivae normal       Pupils: Pupils are equal, round, and reactive to light  Cardiovascular:      Rate and Rhythm: Normal rate and regular rhythm  Heart sounds: Normal heart sounds  Pulmonary:      Effort: Pulmonary effort is normal  No respiratory distress  Breath sounds: Normal breath sounds  Musculoskeletal:         General: Normal range of motion  Cervical back: Normal range of motion and neck supple  Skin:     General: Skin is warm  Capillary Refill: Capillary refill takes less than 2 seconds  Neurological:      General: No focal deficit present  Mental Status: She is alert and oriented to person, place, and time  Psychiatric:         Mood and Affect: Mood normal          Behavior: Behavior normal          Thought Content:  Thought content normal          Judgment: Judgment normal                     Vianca Cherry MD,   [facility]  9/22/2022

## 2022-11-01 ENCOUNTER — APPOINTMENT (EMERGENCY)
Dept: CT IMAGING | Facility: HOSPITAL | Age: 22
End: 2022-11-01

## 2022-11-01 ENCOUNTER — APPOINTMENT (EMERGENCY)
Dept: RADIOLOGY | Facility: HOSPITAL | Age: 22
End: 2022-11-01

## 2022-11-01 ENCOUNTER — HOSPITAL ENCOUNTER (EMERGENCY)
Facility: HOSPITAL | Age: 22
Discharge: HOME | End: 2022-11-01
Attending: EMERGENCY MEDICINE

## 2022-11-01 VITALS
DIASTOLIC BLOOD PRESSURE: 90 MMHG | HEART RATE: 77 BPM | SYSTOLIC BLOOD PRESSURE: 164 MMHG | OXYGEN SATURATION: 98 % | BODY MASS INDEX: 48.08 KG/M2 | WEIGHT: 293 LBS | TEMPERATURE: 98.3 F | RESPIRATION RATE: 18 BRPM

## 2022-11-01 DIAGNOSIS — R51.9 ACUTE NONINTRACTABLE HEADACHE, UNSPECIFIED HEADACHE TYPE: Primary | ICD-10-CM

## 2022-11-01 LAB
2HR DELTA HS TROPONIN: <0 NG/L
ALBUMIN SERPL BCP-MCNC: 3.7 G/DL (ref 3.5–5)
ALP SERPL-CCNC: 115 U/L (ref 46–116)
ALT SERPL W P-5'-P-CCNC: 28 U/L (ref 12–78)
ANION GAP SERPL CALCULATED.3IONS-SCNC: 10 MMOL/L (ref 4–13)
AST SERPL W P-5'-P-CCNC: 23 U/L (ref 5–45)
BASOPHILS # BLD AUTO: 0.03 THOUSANDS/ÂΜL (ref 0–0.1)
BASOPHILS NFR BLD AUTO: 0 % (ref 0–1)
BILIRUB SERPL-MCNC: 0.23 MG/DL (ref 0.2–1)
BUN SERPL-MCNC: 19 MG/DL (ref 5–25)
CALCIUM SERPL-MCNC: 9.2 MG/DL (ref 8.3–10.1)
CARDIAC TROPONIN I PNL SERPL HS: 2 NG/L
CARDIAC TROPONIN I PNL SERPL HS: <2 NG/L
CHLORIDE SERPL-SCNC: 104 MMOL/L (ref 96–108)
CO2 SERPL-SCNC: 26 MMOL/L (ref 21–32)
CREAT SERPL-MCNC: 1.12 MG/DL (ref 0.6–1.3)
EOSINOPHIL # BLD AUTO: 0.16 THOUSAND/ÂΜL (ref 0–0.61)
EOSINOPHIL NFR BLD AUTO: 2 % (ref 0–6)
ERYTHROCYTE [DISTWIDTH] IN BLOOD BY AUTOMATED COUNT: 13.8 % (ref 11.6–15.1)
GFR SERPL CREATININE-BSD FRML MDRD: 68 ML/MIN/1.73SQ M
GLUCOSE SERPL-MCNC: 101 MG/DL (ref 65–140)
HCT VFR BLD AUTO: 38.2 % (ref 34.8–46.1)
HGB BLD-MCNC: 12.3 G/DL (ref 11.5–15.4)
IMM GRANULOCYTES # BLD AUTO: 0.01 THOUSAND/UL (ref 0–0.2)
IMM GRANULOCYTES NFR BLD AUTO: 0 % (ref 0–2)
LYMPHOCYTES # BLD AUTO: 2.76 THOUSANDS/ÂΜL (ref 0.6–4.47)
LYMPHOCYTES NFR BLD AUTO: 35 % (ref 14–44)
MCH RBC QN AUTO: 27.1 PG (ref 26.8–34.3)
MCHC RBC AUTO-ENTMCNC: 32.2 G/DL (ref 31.4–37.4)
MCV RBC AUTO: 84 FL (ref 82–98)
MONOCYTES # BLD AUTO: 0.47 THOUSAND/ÂΜL (ref 0.17–1.22)
MONOCYTES NFR BLD AUTO: 6 % (ref 4–12)
NEUTROPHILS # BLD AUTO: 4.53 THOUSANDS/ÂΜL (ref 1.85–7.62)
NEUTS SEG NFR BLD AUTO: 57 % (ref 43–75)
NRBC BLD AUTO-RTO: 0 /100 WBCS
PLATELET # BLD AUTO: 321 THOUSANDS/UL (ref 149–390)
PMV BLD AUTO: 9.3 FL (ref 8.9–12.7)
POTASSIUM SERPL-SCNC: 4.6 MMOL/L (ref 3.5–5.3)
PROT SERPL-MCNC: 7.7 G/DL (ref 6.4–8.4)
RBC # BLD AUTO: 4.54 MILLION/UL (ref 3.81–5.12)
SODIUM SERPL-SCNC: 140 MMOL/L (ref 135–147)
WBC # BLD AUTO: 7.96 THOUSAND/UL (ref 4.31–10.16)

## 2022-11-01 RX ORDER — DEXAMETHASONE SODIUM PHOSPHATE 10 MG/ML
10 INJECTION, SOLUTION INTRAMUSCULAR; INTRAVENOUS ONCE
Status: COMPLETED | OUTPATIENT
Start: 2022-11-01 | End: 2022-11-01

## 2022-11-01 RX ORDER — KETOROLAC TROMETHAMINE 30 MG/ML
15 INJECTION, SOLUTION INTRAMUSCULAR; INTRAVENOUS ONCE
Status: COMPLETED | OUTPATIENT
Start: 2022-11-01 | End: 2022-11-01

## 2022-11-01 RX ORDER — DIPHENHYDRAMINE HYDROCHLORIDE 50 MG/ML
50 INJECTION INTRAMUSCULAR; INTRAVENOUS ONCE
Status: COMPLETED | OUTPATIENT
Start: 2022-11-01 | End: 2022-11-01

## 2022-11-01 RX ORDER — METOCLOPRAMIDE HYDROCHLORIDE 5 MG/ML
10 INJECTION INTRAMUSCULAR; INTRAVENOUS ONCE
Status: COMPLETED | OUTPATIENT
Start: 2022-11-01 | End: 2022-11-01

## 2022-11-01 RX ORDER — PROPRANOLOL HYDROCHLORIDE 20 MG/1
20 TABLET ORAL ONCE
Status: COMPLETED | OUTPATIENT
Start: 2022-11-01 | End: 2022-11-01

## 2022-11-01 RX ADMIN — DEXAMETHASONE SODIUM PHOSPHATE 10 MG: 10 INJECTION INTRAMUSCULAR; INTRAVENOUS at 21:14

## 2022-11-01 RX ADMIN — METOCLOPRAMIDE 10 MG: 5 INJECTION, SOLUTION INTRAMUSCULAR; INTRAVENOUS at 21:17

## 2022-11-01 RX ADMIN — KETOROLAC TROMETHAMINE 15 MG: 30 INJECTION, SOLUTION INTRAMUSCULAR at 21:15

## 2022-11-01 RX ADMIN — PROPRANOLOL HYDROCHLORIDE 20 MG: 20 TABLET ORAL at 21:17

## 2022-11-01 RX ADMIN — DIPHENHYDRAMINE HYDROCHLORIDE 50 MG: 50 INJECTION, SOLUTION INTRAMUSCULAR; INTRAVENOUS at 21:14

## 2022-11-01 NOTE — Clinical Note
Adia Goff was seen and treated in our emergency department on 11/1/2022  Diagnosis:     Elian Payton  may return to work on return date  She may return on this date: 11/03/2022         If you have any questions or concerns, please don't hesitate to call        Teryl Saint, PA-C    ______________________________           _______________          _______________  Hospital Representative                              Date                                Time

## 2022-11-01 NOTE — Clinical Note
Tierramagdalenedora Co was seen and treated in our emergency department on 11/1/2022  Diagnosis:     Katina Bustos  may return to work on return date  She may return on this date: 11/03/2022         If you have any questions or concerns, please don't hesitate to call        Tiffany Sommers PA-C    ______________________________           _______________          _______________  Hospital Representative                              Date                                Time

## 2022-11-02 ENCOUNTER — TELEPHONE (OUTPATIENT)
Dept: FAMILY MEDICINE CLINIC | Facility: CLINIC | Age: 22
End: 2022-11-02

## 2022-11-02 LAB
ATRIAL RATE: 87 BPM
P AXIS: 59 DEGREES
PR INTERVAL: 120 MS
QRS AXIS: 96 DEGREES
QRSD INTERVAL: 86 MS
QT INTERVAL: 352 MS
QTC INTERVAL: 423 MS
T WAVE AXIS: 55 DEGREES
VENTRICULAR RATE: 87 BPM

## 2022-11-02 NOTE — ED PROVIDER NOTES
History  Chief Complaint   Patient presents with   • Headache     Patient reports headache at base of skull, and into left eye  Patient reports some numbness/ tingly that comes and goes in bilateral arms  Patient reports headache starting today, tingling in hands started yesterday  Patient is a 20-year-old female with a past medical history significant for hypertension presenting to the emergency department for evaluation of headache that started this morning  Headache is posterior, base of skull  She states that the headache radiates into her left eye  Waxes and wanes, worsened by light  7/10 in severity at peak  Headache is currently resolved  Has had migraines in the past, however this headache is very different than her traditional migraines  She is also reporting intermittent numbness to her bilateral upper extremities since yesterday  She is not having any fevers, chills, chest pain, difficulty breathing, focal weakness, visual disturbance, abdominal pain, nausea, vomiting, diarrhea  No confusion or facial droop  No complaints at this time        History provided by:  Patient   used: No    Headache  Pain location:  Occipital  Quality:  Sharp  Radiates to:  Eyes  Severity currently:  0/10  Severity at highest:  7/10  Onset quality:  Gradual  Duration:  12 hours  Timing:  Intermittent  Progression:  Waxing and waning  Chronicity:  New  Similar to prior headaches: no    Context: bright light    Relieved by:  Nothing  Worsened by:  Light  Ineffective treatments:  None tried  Associated symptoms: eye pain, numbness, paresthesias and photophobia    Associated symptoms: no abdominal pain, no back pain, no blurred vision, no congestion, no cough, no diarrhea, no dizziness, no facial pain, no fever, no focal weakness, no loss of balance, no nausea, no near-syncope, no neck pain, no neck stiffness, no seizures, no sore throat, no URI, no visual change, no vomiting and no weakness Prior to Admission Medications   Prescriptions Last Dose Informant Patient Reported? Taking?    Prenatal MV & Min w/FA-DHA (Prenatal Adult Gummy/DHA/FA) 0 4-25 MG CHEW  Self Yes No   Sig: Chew 2 tablets daily   acetaminophen (TYLENOL) 325 mg tablet   No No   Sig: Take 2 tablets (650 mg total) by mouth every 4 (four) hours as needed for mild pain   baclofen 10 mg tablet   No No   Sig: Take 1 tablet (10 mg total) by mouth 3 (three) times a day   ibuprofen (MOTRIN) 600 mg tablet   No No   Sig: Take 1 tablet (600 mg total) by mouth every 6 (six) hours   loratadine (CLARITIN) 10 mg tablet  Self Yes No   Sig: Take 10 mg by mouth daily   meloxicam (Mobic) 15 mg tablet   No No   Sig: Take 1 tablet (15 mg total) by mouth daily as needed for moderate pain   nystatin (MYCOSTATIN) 500,000 units/5 mL suspension   Yes No   Patient not taking: Reported on 2022   nystatin (MYCOSTATIN) cream   Yes No   Patient not taking: Reported on 2022   propranolol (INDERAL) 20 mg tablet   No No   Sig: Take 1 tablet (20 mg total) by mouth every 12 (twelve) hours   traZODone (DESYREL) 50 mg tablet   No No   Sig: Take 1 tablet (50 mg total) by mouth daily at bedtime      Facility-Administered Medications: None       Past Medical History:   Diagnosis Date   • Anemia     with pregnancy , receive iron infusions   • Anxiety    • Chronic hip pain, left    • Chronic hypertension affecting pregnancy 2022   • Chronic hypertension with superimposed preeclampsia 7/3/3088   • Complication of anesthesia     pt reports required" more anesthesia "for her oral surgery and hand surgery to be effective   • Congenital pes planus of both feet     Resolved     • COVID-19     with pregnancy around 2020   • Difficulty walking     Resolved 2017 , secondary to hip left   • Eczema    • Hypertension     chronic HTN    • Mass of hand, left     Resolved 2017 , benign    • Obesity 2016   • Pain     chronic back pain   •  (spontaneous vaginal delivery) 2021   •  (spontaneous vaginal delivery) 2/15/2022   • Varicella     vaccine   • Visual impairment     eyewear       Past Surgical History:   Procedure Laterality Date   • FL INJECTION LEFT HIP (ARTHROGRAM)  2018   • HAND SURGERY Left    • WISDOM TOOTH EXTRACTION         Family History   Problem Relation Age of Onset   • Hypertension Mother    • Heart disease Mother    • Other Mother         prediabetic   • Lupus Mother         Erythematosus   • Miscarriages / Stillbirths Mother    • Heart disease Father    • Hypertension Father    • Stroke Father    • Diabetes type II Maternal Grandfather    • Heart disease Maternal Grandfather    • Atrial fibrillation Maternal Grandfather    • Diabetes Maternal Grandfather    • Diabetes type II Paternal Grandmother    • Kidney failure Paternal Grandmother    • Diabetes Paternal Grandmother    • Anxiety disorder Sister    • Depression Sister    • Other Sister         Postural Orthostatic Tachycardia Syndrome   • Asthma Sister    • No Known Problems Maternal Grandmother    • Heart disease Paternal Grandfather      I have reviewed and agree with the history as documented  E-Cigarette/Vaping   • E-Cigarette Use Never User      E-Cigarette/Vaping Substances   • Nicotine No    • THC No    • CBD No    • Flavoring No    • Other No    • Unknown No      Social History     Tobacco Use   • Smoking status: Never Smoker   • Smokeless tobacco: Never Used   Vaping Use   • Vaping Use: Never used   Substance Use Topics   • Alcohol use: Not Currently     Alcohol/week: 0 0 standard drinks     Comment: Socially/ not frequently   • Drug use: No       Review of Systems   Constitutional: Negative for fever  HENT: Negative for congestion and sore throat  Eyes: Positive for photophobia and pain  Negative for blurred vision  Respiratory: Negative for cough  Cardiovascular: Negative for near-syncope     Gastrointestinal: Negative for abdominal pain, diarrhea, nausea and vomiting  Musculoskeletal: Negative for back pain, neck pain and neck stiffness  Neurological: Positive for numbness, headaches and paresthesias  Negative for dizziness, focal weakness, seizures, weakness and loss of balance  All other systems reviewed and are negative  Physical Exam  Physical Exam  Vitals reviewed  Constitutional:       General: She is not in acute distress  Appearance: Normal appearance  She is not ill-appearing, toxic-appearing or diaphoretic  HENT:      Head: Normocephalic and atraumatic  Right Ear: External ear normal       Left Ear: External ear normal       Nose: Nose normal  No congestion or rhinorrhea  Mouth/Throat:      Mouth: Mucous membranes are moist       Pharynx: Oropharynx is clear  No oropharyngeal exudate or posterior oropharyngeal erythema  Eyes:      General: No scleral icterus  Right eye: No discharge  Left eye: No discharge  Extraocular Movements: Extraocular movements intact  Conjunctiva/sclera: Conjunctivae normal       Pupils: Pupils are equal, round, and reactive to light  Cardiovascular:      Rate and Rhythm: Normal rate and regular rhythm  Pulses: Normal pulses  Heart sounds: Normal heart sounds  No murmur heard  No friction rub  No gallop  Pulmonary:      Effort: Pulmonary effort is normal  No respiratory distress  Breath sounds: Normal breath sounds  No stridor  No wheezing, rhonchi or rales  Musculoskeletal:      Cervical back: Normal range of motion and neck supple  Right lower leg: No edema  Left lower leg: No edema  Skin:     General: Skin is warm and dry  Capillary Refill: Capillary refill takes less than 2 seconds  Neurological:      General: No focal deficit present  Mental Status: She is alert and oriented to person, place, and time  Cranial Nerves: No cranial nerve deficit  Sensory: No sensory deficit  Motor: No weakness  Coordination: Coordination normal    Psychiatric:         Mood and Affect: Mood normal          Behavior: Behavior normal          Vital Signs  ED Triage Vitals   Temperature Pulse Respirations Blood Pressure SpO2   11/01/22 1907 11/01/22 1907 11/01/22 1907 11/01/22 1907 11/01/22 1907   98 3 °F (36 8 °C) 102 20 (!) 203/116 100 %      Temp Source Heart Rate Source Patient Position - Orthostatic VS BP Location FiO2 (%)   11/01/22 1907 11/01/22 1907 11/01/22 1907 11/01/22 1907 --   Oral Monitor Sitting Left arm       Pain Score       11/01/22 1908       6           Vitals:    11/01/22 1907 11/01/22 1946 11/01/22 2000 11/01/22 2117   BP: (!) 203/116 (!) 192/76 164/90    Pulse: 102 (!) 106 89 77   Patient Position - Orthostatic VS: Sitting Sitting           Visual Acuity  Visual Acuity    Flowsheet Row Most Recent Value   L Pupil Size (mm) 3   R Pupil Size (mm) 3          ED Medications  Medications   ketorolac (TORADOL) injection 15 mg (15 mg Intravenous Given 11/1/22 2115)   metoclopramide (REGLAN) injection 10 mg (10 mg Intravenous Given 11/1/22 2117)   diphenhydrAMINE (BENADRYL) injection 50 mg (50 mg Intravenous Given 11/1/22 2114)   dexamethasone (PF) (DECADRON) injection 10 mg (10 mg Intravenous Given 11/1/22 2114)   propranolol (INDERAL) tablet 20 mg (20 mg Oral Given 11/1/22 2117)       Diagnostic Studies  Results Reviewed     Procedure Component Value Units Date/Time    HS Troponin I 2hr [[id number]] Collected: 11/01/22 2118    Lab Status:  In process Specimen: Blood from Arm, Left Updated: 11/01/22 2120    Comprehensive metabolic panel [[id number]] Collected: 11/01/22 1916    Lab Status: Final result Specimen: Blood from Arm, Left Updated: 11/01/22 2029     Sodium 140 mmol/L      Potassium 4 6 mmol/L      Chloride 104 mmol/L      CO2 26 mmol/L      ANION GAP 10 mmol/L      BUN 19 mg/dL      Creatinine 1 12 mg/dL      Glucose 101 mg/dL      Calcium 9 2 mg/dL      AST 23 U/L      ALT 28 U/L      Alkaline Phosphatase 115 U/L      Total Protein 7 7 g/dL      Albumin 3 7 g/dL      Total Bilirubin 0 23 mg/dL      eGFR 68 ml/min/1 73sq m     Narrative:      [place] guidelines for Chronic Kidney Disease (CKD):   •  Stage 1 with normal or high GFR (GFR > 90 mL/min/1 73 square meters)  •  Stage 2 Mild CKD (GFR = 60-89 mL/min/1 73 square meters)  •  Stage 3A Moderate CKD (GFR = 45-59 mL/min/1 73 square meters)  •  Stage 3B Moderate CKD (GFR = 30-44 mL/min/1 73 square meters)  •  Stage 4 Severe CKD (GFR = 15-29 mL/min/1 73 square meters)  •  Stage 5 End Stage CKD (GFR <15 mL/min/1 73 square meters)  Note: GFR calculation is accurate only with a steady state creatinine    HS Troponin 0hr (reflex protocol) [[id number]]  (Normal) Collected: 11/01/22 1916    Lab Status: Final result Specimen: Blood from Arm, Left Updated: 11/01/22 2015     hs TnI 0hr 2 ng/L     HS Troponin I 4hr [[id number]]     Lab Status: No result Specimen: Blood     CBC and differential [[id number]] Collected: 11/01/22 1916    Lab Status: Final result Specimen: Blood from Arm, Left Updated: 11/01/22 1926     WBC 7 96 Thousand/uL      RBC 4 54 Million/uL      Hemoglobin 12 3 g/dL      Hematocrit 38 2 %      MCV 84 fL      MCH 27 1 pg      MCHC 32 2 g/dL      RDW 13 8 %      MPV 9 3 fL      Platelets 127 Thousands/uL      nRBC 0 /100 WBCs      Neutrophils Relative 57 %      Immat GRANS % 0 %      Lymphocytes Relative 35 %      Monocytes Relative 6 %      Eosinophils Relative 2 %      Basophils Relative 0 %      Neutrophils Absolute 4 53 Thousands/µL      Immature Grans Absolute 0 01 Thousand/uL      Lymphocytes Absolute 2 76 Thousands/µL      Monocytes Absolute 0 47 Thousand/µL      Eosinophils Absolute 0 16 Thousand/µL      Basophils Absolute 0 03 Thousands/µL                  CT head without contrast   Final Result by Brianne Lama MD (11/01 2127)      No acute intracranial abnormality                    Workstation performed: [id number]         XR chest 1 view portable    (Results Pending)              Procedures  Procedures         ED Course                                             MDM  Number of Diagnoses or Management Options  Diagnosis management comments: Patient presenting for evaluation of headache radiating to her left eye  Upon arrival, she appears comfortable  She is not any acute distress  Vital signs unremarkable  No concerning findings on physical exam   No neurologic deficits  Lab work reassuring  CT head unremarkable  Migraine cocktail given for symptomatic management  Headache currently resolved, however dexamethasone was given to prevent rebound headache  Symptoms likely secondary to atypical migraine  She was discharged home with instructions to follow-up with Neurology  Strict return precautions were discussed  She is in stable condition at time of discharge  Amount and/or Complexity of Data Reviewed  Clinical lab tests: ordered and reviewed  Tests in the radiology section of CPT®: ordered and reviewed    Risk of Complications, Morbidity, and/or Mortality  Presenting problems: low  Diagnostic procedures: low  Management options: low    Patient Progress  Patient progress: stable      Disposition  Final diagnoses:   Acute nonintractable headache, unspecified headache type     Time reflects when diagnosis was documented in both MDM as applicable and the Disposition within this note     Time User Action Codes Description Comment    11/1/2022  9:33 PM Berna Hi Add [R51 9] Acute nonintractable headache, unspecified headache type       ED Disposition     ED Disposition   Discharge    Condition   Stable    Date/Time   Tue Nov 1, 2022  9:33 PM    Comment   Hermelinda Rahman discharge to home/self care                 Follow-up Information     Follow up With Specialties Details Why Contact Info Additional [address] [facility] Emergency Department Emergency Medicine Go to  If symptoms worsen [address] [facility] [id number] [address] [facility] Emergency Department, [address] [facility], [place], [place], Nelson Monzon MD Family Medicine Schedule an appointment as soon as possible for a visit  For follow up [address]       [facility] Neurology [address]  Neurology Schedule an appointment as soon as possible for a visit  For follow up [address] [facility] [id number]  [address]  Neurology [address] [facility], [address], [address] [phone]          Patient's Medications   Discharge Prescriptions    No medications on file       No discharge procedures on file      PDMP Review       Value Time User    PDMP Reviewed  Yes 6/3/2021  8:26 AM Gianni Kern MD          ED Provider  Electronically Signed by           Sandi Gardiner PA-C  11/01/22 2130

## 2022-11-05 ENCOUNTER — OFFICE VISIT (OUTPATIENT)
Dept: FAMILY MEDICINE CLINIC | Facility: CLINIC | Age: 22
End: 2022-11-05

## 2022-11-05 ENCOUNTER — HOSPITAL ENCOUNTER (OUTPATIENT)
Dept: RADIOLOGY | Facility: HOSPITAL | Age: 22
Discharge: HOME | End: 2022-11-05

## 2022-11-05 VITALS
RESPIRATION RATE: 18 BRPM | OXYGEN SATURATION: 99 % | WEIGHT: 293 LBS | BODY MASS INDEX: 41.95 KG/M2 | HEIGHT: 70 IN | DIASTOLIC BLOOD PRESSURE: 96 MMHG | SYSTOLIC BLOOD PRESSURE: 144 MMHG | HEART RATE: 82 BPM | TEMPERATURE: 98.6 F

## 2022-11-05 DIAGNOSIS — H53.8 BLURRY VISION: Primary | ICD-10-CM

## 2022-11-05 DIAGNOSIS — R51.9 BILATERAL HEADACHES: ICD-10-CM

## 2022-11-05 DIAGNOSIS — R53.1 WEAKNESS: ICD-10-CM

## 2022-11-05 DIAGNOSIS — R20.0 NUMBNESS IN BOTH HANDS: ICD-10-CM

## 2022-11-05 RX ORDER — METHOCARBAMOL 500 MG/1
500 TABLET, FILM COATED ORAL 3 TIMES DAILY
Qty: 30 TABLET | Refills: 0 | Status: SHIPPED | OUTPATIENT
Start: 2022-11-05

## 2022-11-05 NOTE — PROGRESS NOTES
Jonathan Snyder 2000 female MRN: [id number]    FAMILY PRACTICE OFFICE VISIT  [facility] - [address]      ASSESSMENT/PLAN  Jonathan Snyder is a 23 y o  female presents to the office for    Diagnoses and all orders for this visit:    Blurry vision  -     Ambulatory Referral to Neurology; Future  -     MRI brain wo contrast; Future    Weakness  -     Ambulatory Referral to Neurology; Future  -     MRI brain wo contrast; Future  -     XR spine cervical complete 4 or 5 vw non injury; Future  -     methocarbamol (ROBAXIN) 500 mg tablet; Take 1 tablet (500 mg total) by mouth 3 (three) times a day    Bilateral headaches  -     Ambulatory Referral to Neurology; Future  -     MRI brain wo contrast; Future  -     methocarbamol (ROBAXIN) 500 mg tablet; Take 1 tablet (500 mg total) by mouth 3 (three) times a day    Numbness in both hands  -     XR spine cervical complete 4 or 5 vw non injury; Future     Blurry version with stabbing eye pressure and weakness of the upper extremities  This was an acute sudden onset  Patient reported to the emergency room where she had a workup  I reviewed all labs and CT scan  I continue to have concern for possible MS therefore would like an MRI of the brain  Advised patient that if the MRI is negative then this is likely coming from her cervical spine  Recommend starting different muscle relaxants such as Robaxin and discontinuing baclofen  r       Future Appointments   Date Time Provider Digna Issa   1/17/2023  2:30 PM DO KODI Teresa [facility]          SUBJECTIVE  CC: Follow-up (ER f/u )      HPI:  Jonathan Snyder is a 23 y o  female who presents for an ER follow-up  Excruciating pain of her base of her skull, stabbing eye pain  Numbness tingling in her arm both  Couldn't get her bottle open  ,  Could not even hold a bottle for her son  Patient states is never happened  She has never had upper extremity weakness  Patient states that she has not really had any cervical trauma but does have a long history of joint stiffness and abnormalities  Denies any urinary or fecal incontinence  Review of Systems   Constitutional: Negative for activity change, appetite change, chills, fatigue and fever  HENT: Negative for congestion  Eyes: Positive for pain  Respiratory: Negative for cough, chest tightness and shortness of breath  Cardiovascular: Negative for chest pain and leg swelling  Gastrointestinal: Negative for abdominal distention, abdominal pain, constipation, diarrhea, nausea and vomiting  Neurological: Positive for headaches  All other systems reviewed and are negative        Historical Information   The patient history was reviewed as follows:  Past Medical History:   Diagnosis Date   • Anemia     with pregnancy , receive iron infusions   • Anxiety    • Chronic hip pain, left    • Chronic hypertension affecting pregnancy 2022   • Chronic hypertension with superimposed preeclampsia 5290   • Complication of anesthesia     pt reports required" more anesthesia "for her oral surgery and hand surgery to be effective   • Congenital pes planus of both feet     Resolved     • COVID-19     with pregnancy around 2020   • Difficulty walking     Resolved 2017 , secondary to hip left   • Eczema    • Hypertension     chronic HTN    • Mass of hand, left     Resolved 2017 , benign    • Obesity 2016   • Pain     chronic back pain   •  (spontaneous vaginal delivery) 2021   •  (spontaneous vaginal delivery) 2/15/2022   • Varicella     vaccine   • Visual impairment     eyewear         Medications:     Current Outpatient Medications:   •  acetaminophen (TYLENOL) 325 mg tablet, Take 2 tablets (650 mg total) by mouth every 4 (four) hours as needed for mild pain, Disp: , Rfl: 0  •  ibuprofen (MOTRIN) 600 mg tablet, Take 1 tablet (600 mg total) by mouth every 6 (six) hours, Disp: 30 tablet, Rfl: 0  •  loratadine (CLARITIN) 10 mg tablet, Take 10 mg by mouth daily, Disp: , Rfl:   •  meloxicam (Mobic) 15 mg tablet, Take 1 tablet (15 mg total) by mouth daily as needed for moderate pain, Disp: 30 tablet, Rfl: 0  •  methocarbamol (ROBAXIN) 500 mg tablet, Take 1 tablet (500 mg total) by mouth 3 (three) times a day, Disp: 30 tablet, Rfl: 0  •  Prenatal MV & Min w/FA-DHA (Prenatal Adult Gummy/DHA/FA) 0 4-25 MG CHEW, Chew 2 tablets daily, Disp: , Rfl:   •  propranolol (INDERAL) 20 mg tablet, Take 1 tablet (20 mg total) by mouth every 12 (twelve) hours, Disp: 60 tablet, Rfl: 2  •  traZODone (DESYREL) 50 mg tablet, Take 1 tablet (50 mg total) by mouth daily at bedtime, Disp: , Rfl:   •  baclofen 10 mg tablet, Take 1 tablet (10 mg total) by mouth 3 (three) times a day, Disp: 90 tablet, Rfl: 6  •  nystatin (MYCOSTATIN) 500,000 units/5 mL suspension, , Disp: , Rfl:   •  nystatin (MYCOSTATIN) cream, , Disp: , Rfl:     No Known Allergies    OBJECTIVE  Vitals:   Vitals:    11/05/22 1019   BP: 144/96   BP Location: Left arm   Patient Position: Sitting   Cuff Size: Large   Pulse: 82   Resp: 18   Temp: 98 6 °F (37 °C)   SpO2: 99%   Weight: (!) 150 kg (331 lb)   Height: 5' 10" (1 778 m)         Physical Exam  Vitals reviewed  Constitutional:       Appearance: She is well-developed  HENT:      Head: Normocephalic and atraumatic  Eyes:      Conjunctiva/sclera: Conjunctivae normal       Pupils: Pupils are equal, round, and reactive to light  Cardiovascular:      Rate and Rhythm: Normal rate and regular rhythm  Heart sounds: Normal heart sounds  Pulmonary:      Effort: Pulmonary effort is normal  No respiratory distress  Breath sounds: Normal breath sounds  Musculoskeletal:         General: Normal range of motion  Cervical back: Normal range of motion and neck supple  Skin:     General: Skin is warm  Capillary Refill: Capillary refill takes less than 2 seconds     Neurological: Mental Status: She is alert and oriented to person, place, and time                      Ariadna Ribeiro,   [facility]  11/6/2022

## 2022-11-06 ENCOUNTER — DOCUMENTATION (OUTPATIENT)
Dept: FAMILY MEDICINE CLINIC | Facility: CLINIC | Age: 22
End: 2022-11-06

## 2022-11-06 DIAGNOSIS — R53.1 WEAKNESS: Primary | ICD-10-CM

## 2022-11-06 DIAGNOSIS — R51.9 BILATERAL HEADACHES: ICD-10-CM

## 2022-11-06 RX ORDER — METHOCARBAMOL 500 MG/1
500 TABLET, FILM COATED ORAL 3 TIMES DAILY
Qty: 30 TABLET | Refills: 0 | Status: CANCELLED | OUTPATIENT
Start: 2022-11-06

## 2022-11-07 NOTE — PROGRESS NOTES
Patient notify us at the office that unfortunately the pharmacy close before she was able to  her medications    Would like it to be recalled in to [facility]

## 2022-11-12 DIAGNOSIS — I10 ESSENTIAL HYPERTENSION: ICD-10-CM

## 2022-11-12 DIAGNOSIS — R00.0 TACHYCARDIA: ICD-10-CM

## 2022-11-13 RX ORDER — PROPRANOLOL HYDROCHLORIDE 20 MG/1
TABLET ORAL
Qty: 60 TABLET | Refills: 2 | Status: SHIPPED | OUTPATIENT
Start: 2022-11-13

## 2022-11-19 ENCOUNTER — HOSPITAL ENCOUNTER (OUTPATIENT)
Dept: RADIOLOGY | Facility: HOSPITAL | Age: 22
Discharge: HOME | End: 2022-11-19
Attending: FAMILY MEDICINE

## 2022-11-19 DIAGNOSIS — R51.9 BILATERAL HEADACHES: ICD-10-CM

## 2022-11-19 DIAGNOSIS — H53.8 BLURRY VISION: ICD-10-CM

## 2022-11-19 DIAGNOSIS — R53.1 WEAKNESS: ICD-10-CM

## 2022-11-22 ENCOUNTER — TELEPHONE (OUTPATIENT)
Dept: FAMILY MEDICINE CLINIC | Facility: CLINIC | Age: 22
End: 2022-11-22

## 2022-11-22 NOTE — TELEPHONE ENCOUNTER
----- Message from Valentino Hutching, MD sent at 11/22/2022  5:38 PM EST -----  Please fax to Neurology Dr Mireille Castellanos in [facility] for eval  Reviewed in detail with patient results

## 2023-01-09 ENCOUNTER — TELEMEDICINE (OUTPATIENT)
Dept: FAMILY MEDICINE CLINIC | Facility: CLINIC | Age: 23
End: 2023-01-09

## 2023-01-09 DIAGNOSIS — F41.9 ANXIETY: Primary | ICD-10-CM

## 2023-01-09 DIAGNOSIS — M54.16 LUMBAR RADICULOPATHY: ICD-10-CM

## 2023-01-09 RX ORDER — DULOXETIN HYDROCHLORIDE 20 MG/1
20 CAPSULE, DELAYED RELEASE ORAL DAILY
Qty: 30 CAPSULE | Refills: 0 | Status: SHIPPED | OUTPATIENT
Start: 2023-01-09

## 2023-01-09 NOTE — PROGRESS NOTES
Virtual Regular Visit    Verification of patient location:    Patient is located in the following state in which I hold an active license [place]      Assessment/Plan:    Problem List Items Addressed This Visit    None  Visit Diagnoses     Anxiety    -  Primary    Relevant Medications    DULoxetine (CYMBALTA) 20 mg capsule    Lumbar radiculopathy            I started the patient on Cymbalta given that I do believe that it will help her with her anxiety and her chronic pain  Will see how the patient feels within 1 month of having this medication         Reason for visit is No chief complaint on file  Encounter provider Ana Oro MD    Provider located at [address] [place] [phone]      Recent Visits  No visits were found meeting these conditions  Showing recent visits within past 7 days and meeting all other requirements  Today's Visits  Date Type Provider Dept   01/09/23 Telemedicine Ana Oro MD [address] [place] [address] today's visits and meeting all other requirements  Future Appointments  No visits were found meeting these conditions  Showing future appointments within next 150 days and meeting all other requirements       The patient was identified by name and date of birth  Roberto Seth was informed that this is a telemedicine visit and that the visit is being conducted through the [address] [place] [address] Now platform  She agrees to proceed     My office door was closed  No one else was in the room  She acknowledged consent and understanding of privacy and security of the video platform  The patient has agreed to participate and understands they can discontinue the visit at any time  Patient is aware this is a billable service  Subjective  Roberto Seth is a 20 y o  female  Presents via video  Patient states that she has been having trouble balancing her anxiety    States usually she has really good at controlling it but recently has been hard was stressors with her relationship and having 2 children  Patient states that she is always looking for other people and has having difficulty focusing herself    Does have a history of chronic pain      HPI     Past Medical History:   Diagnosis Date   • Anemia     with pregnancy , receive iron infusions   • Anxiety    • Chronic hip pain, left    • Chronic hypertension affecting pregnancy 2022   • Chronic hypertension with superimposed preeclampsia 4660   • Complication of anesthesia     pt reports required" more anesthesia "for her oral surgery and hand surgery to be effective   • Congenital pes planus of both feet     Resolved     • COVID-19     with pregnancy around 2020   • Difficulty walking     Resolved 2017 , secondary to hip left   • Eczema    • Hypertension     chronic HTN    • Mass of hand, left     Resolved 2017 , benign    • Obesity 2016   • Pain     chronic back pain   •  (spontaneous vaginal delivery) 2021   •  (spontaneous vaginal delivery) 2/15/2022   • Varicella     vaccine   • Visual impairment     eyewear       Past Surgical History:   Procedure Laterality Date   • FL INJECTION LEFT HIP (ARTHROGRAM)  2018   • HAND SURGERY Left    • WISDOM TOOTH EXTRACTION         Current Outpatient Medications   Medication Sig Dispense Refill   • DULoxetine (CYMBALTA) 20 mg capsule Take 1 capsule (20 mg total) by mouth daily 30 capsule 0   • acetaminophen (TYLENOL) 325 mg tablet Take 2 tablets (650 mg total) by mouth every 4 (four) hours as needed for mild pain  0   • baclofen 10 mg tablet Take 1 tablet (10 mg total) by mouth 3 (three) times a day 90 tablet 6   • ibuprofen (MOTRIN) 600 mg tablet Take 1 tablet (600 mg total) by mouth every 6 (six) hours 30 tablet 0   • loratadine (CLARITIN) 10 mg tablet Take 10 mg by mouth daily     • meloxicam (Mobic) 15 mg tablet Take 1 tablet (15 mg total) by mouth daily as needed for moderate pain 30 tablet 0   • methocarbamol (ROBAXIN) 500 mg tablet Take 1 tablet (500 mg total) by mouth 3 (three) times a day 90 tablet 2   • nystatin (MYCOSTATIN) 500,000 units/5 mL suspension  (Patient not taking: No sig reported)     • nystatin (MYCOSTATIN) cream  (Patient not taking: No sig reported)     • Prenatal MV & Min w/FA-DHA (Prenatal Adult Gummy/DHA/FA) 0 4-25 MG CHEW Chew 2 tablets daily     • propranolol (INDERAL) 20 mg tablet take 1 tablet by mouth every 12 hours 60 tablet 2   • traZODone (DESYREL) 50 mg tablet Take 1 tablet (50 mg total) by mouth daily at bedtime       No current facility-administered medications for this visit  No Known Allergies    Review of Systems   Constitutional: Negative for activity change, appetite change, chills, fatigue and fever  HENT: Negative for congestion  Respiratory: Negative for cough, chest tightness and shortness of breath  Cardiovascular: Negative for chest pain and leg swelling  Gastrointestinal: Negative for abdominal distention, abdominal pain, constipation, diarrhea, nausea and vomiting  Musculoskeletal: Positive for back pain and gait problem  Psychiatric/Behavioral: The patient is nervous/anxious  All other systems reviewed and are negative  Video Exam    There were no vitals filed for this visit  Physical Exam  Vitals reviewed  Constitutional:       Appearance: She is well-developed  HENT:      Head: Normocephalic and atraumatic  Eyes:      Conjunctiva/sclera: Conjunctivae normal       Pupils: Pupils are equal, round, and reactive to light  Cardiovascular:      Rate and Rhythm: Normal rate and regular rhythm  Heart sounds: Normal heart sounds  Pulmonary:      Effort: Pulmonary effort is normal  No respiratory distress  Breath sounds: Normal breath sounds  Musculoskeletal:         General: Normal range of motion  Cervical back: Normal range of motion and neck supple  Skin:     General: Skin is warm  Capillary Refill: Capillary refill takes less than 2 seconds  Neurological:      Mental Status: She is alert and oriented to person, place, and time            I spent 15 minutes directly with the patient during this visit

## 2023-02-02 DIAGNOSIS — G47.00 INSOMNIA, UNSPECIFIED TYPE: ICD-10-CM

## 2023-02-02 RX ORDER — TRAZODONE HYDROCHLORIDE 100 MG/1
TABLET ORAL
Qty: 90 TABLET | Refills: 1 | Status: SHIPPED | OUTPATIENT
Start: 2023-02-02

## 2023-02-06 ENCOUNTER — TELEMEDICINE (OUTPATIENT)
Dept: FAMILY MEDICINE CLINIC | Facility: CLINIC | Age: 23
End: 2023-02-06

## 2023-02-06 VITALS — BODY MASS INDEX: 41.95 KG/M2 | HEIGHT: 70 IN | WEIGHT: 293 LBS

## 2023-02-06 DIAGNOSIS — R53.1 WEAKNESS: ICD-10-CM

## 2023-02-06 DIAGNOSIS — R51.9 BILATERAL HEADACHES: ICD-10-CM

## 2023-02-06 DIAGNOSIS — F41.9 ANXIETY: Primary | ICD-10-CM

## 2023-02-06 RX ORDER — METHOCARBAMOL 750 MG/1
750 TABLET, FILM COATED ORAL 3 TIMES DAILY
Qty: 270 TABLET | Refills: 0 | Status: SHIPPED | OUTPATIENT
Start: 2023-02-06 | End: 2023-05-07

## 2023-02-06 RX ORDER — DULOXETIN HYDROCHLORIDE 30 MG/1
30 CAPSULE, DELAYED RELEASE ORAL DAILY
Qty: 90 CAPSULE | Refills: 0 | Status: SHIPPED | OUTPATIENT
Start: 2023-02-06

## 2023-02-06 NOTE — PROGRESS NOTES
Virtual Regular Visit    Verification of patient location:    Patient is located in the following state in which I hold an active license [place]      Assessment/Plan:    Problem List Items Addressed This Visit    None  Visit Diagnoses     Anxiety    -  Primary    Relevant Medications    DULoxetine (Cymbalta) 30 mg delayed release capsule    Weakness        Relevant Medications    methocarbamol (ROBAXIN) 750 mg tablet    Bilateral headaches        Relevant Medications    methocarbamol (ROBAXIN) 750 mg tablet      Changes shown above  Repeat eval in 4 weeks         Reason for visit is   Chief Complaint   Patient presents with   • Follow-up     Medication    • Virtual Regular Visit        Encounter provider Alexander Barros MD    Provider located at [address] [place] [phone]      Recent Visits  Date Type Provider Dept   02/06/23 Telemedicine Alexander Barros MD [address] [facility] recent visits within past 7 days and meeting all other requirements  Future Appointments  No visits were found meeting these conditions  Showing future appointments within next 150 days and meeting all other requirements       The patient was identified by name and date of birth  Ari Clay was informed that this is a telemedicine visit and that the visit is being conducted through the [address] Now platform  She agrees to proceed     My office door was closed  No one else was in the room  She acknowledged consent and understanding of privacy and security of the video platform  The patient has agreed to participate and understands they can discontinue the visit at any time  Patient is aware this is a billable service  Subjective  Ari Clay is a 20 y o  female patient states that she feels like her panic attacks are slightly improving  She states that she does not think she needs to go to fully 60 mg of Cymbalta but would like to try midway    She states that her muscle spasms are still present but would like to try a little bit of a stronger muscle relaxant to see if that helps her  States that her headaches have been slowly improving    Trazodone has been helping her insomnia  HPI     Past Medical History:   Diagnosis Date   • Anemia     with pregnancy , receive iron infusions   • Anxiety    • Chronic hip pain, left    • Chronic hypertension affecting pregnancy 2022   • Chronic hypertension with superimposed preeclampsia    • Complication of anesthesia     pt reports required" more anesthesia "for her oral surgery and hand surgery to be effective   • Congenital pes planus of both feet     Resolved     • COVID-19     with pregnancy around 2020   • Difficulty walking     Resolved 2017 , secondary to hip left   • Eczema    • Hypertension     chronic HTN    • Mass of hand, left     Resolved 2017 , benign    • Obesity 2016   • Pain     chronic back pain   •  (spontaneous vaginal delivery) 2021   •  (spontaneous vaginal delivery) 2/15/2022   • Varicella     vaccine   • Visual impairment     eyewear       Past Surgical History:   Procedure Laterality Date   • FL INJECTION LEFT HIP (ARTHROGRAM)  2018   • HAND SURGERY Left    • WISDOM TOOTH EXTRACTION         Current Outpatient Medications   Medication Sig Dispense Refill   • acetaminophen (TYLENOL) 325 mg tablet Take 2 tablets (650 mg total) by mouth every 4 (four) hours as needed for mild pain  0   • DULoxetine (Cymbalta) 30 mg delayed release capsule Take 1 capsule (30 mg total) by mouth daily 90 capsule 0   • ibuprofen (MOTRIN) 600 mg tablet Take 1 tablet (600 mg total) by mouth every 6 (six) hours 30 tablet 0   • loratadine (CLARITIN) 10 mg tablet Take 10 mg by mouth daily     • methocarbamol (ROBAXIN) 750 mg tablet Take 1 tablet (750 mg total) by mouth 3 (three) times a day 270 tablet 0   • Prenatal MV & Min w/FA-DHA (Prenatal Adult Gummy/DHA/FA) 0 4-25 MG CHEW Chew 2 tablets daily     • propranolol (INDERAL) 20 mg tablet take 1 tablet by mouth every 12 hours 60 tablet 2   • traZODone (DESYREL) 100 mg tablet take 1 tablet by mouth at bedtime 90 tablet 1     No current facility-administered medications for this visit  No Known Allergies    Review of Systems   Constitutional: Negative for activity change, appetite change, chills, fatigue and fever  HENT: Negative for congestion  Respiratory: Negative for cough, chest tightness and shortness of breath  Cardiovascular: Negative for chest pain and leg swelling  Gastrointestinal: Negative for abdominal distention, abdominal pain, constipation, diarrhea, nausea and vomiting  Musculoskeletal: Positive for arthralgias, back pain and myalgias  Psychiatric/Behavioral: The patient is nervous/anxious  All other systems reviewed and are negative  Video Exam    Vitals:    02/06/23 1417   Weight: (!) 150 kg (331 lb)   Height: 5' 10" (1 778 m)       Physical Exam  Constitutional:       Appearance: Normal appearance  Pulmonary:      Effort: Pulmonary effort is normal    Musculoskeletal:         General: Normal range of motion  Neurological:      General: No focal deficit present  Mental Status: She is alert and oriented to person, place, and time  Psychiatric:         Mood and Affect: Mood normal          Behavior: Behavior normal          Thought Content:  Thought content normal          Judgment: Judgment normal           I spent 15 minutes directly with the patient during this visit

## 2023-03-06 DIAGNOSIS — R00.0 TACHYCARDIA: ICD-10-CM

## 2023-03-06 DIAGNOSIS — I10 ESSENTIAL HYPERTENSION: ICD-10-CM

## 2023-03-06 RX ORDER — PROPRANOLOL HYDROCHLORIDE 20 MG/1
TABLET ORAL
Qty: 60 TABLET | Refills: 2 | Status: SHIPPED | OUTPATIENT
Start: 2023-03-06

## 2023-05-02 DIAGNOSIS — F41.9 ANXIETY: ICD-10-CM

## 2023-05-02 RX ORDER — DULOXETIN HYDROCHLORIDE 30 MG/1
CAPSULE, DELAYED RELEASE ORAL
Qty: 90 CAPSULE | Refills: 0 | Status: SHIPPED | OUTPATIENT
Start: 2023-05-02 | End: 2023-05-03

## 2023-05-02 NOTE — TELEPHONE ENCOUNTER
She said it is doing ok  She wanted to try the higher dose to see if helps a little more or if it will be to much

## 2023-05-03 DIAGNOSIS — F41.9 ANXIETY: ICD-10-CM

## 2023-05-03 RX ORDER — DULOXETIN HYDROCHLORIDE 60 MG/1
60 CAPSULE, DELAYED RELEASE ORAL DAILY
Qty: 60 CAPSULE | Refills: 0 | Status: SHIPPED | OUTPATIENT
Start: 2023-05-03 | End: 2023-06-29

## 2023-05-03 NOTE — TELEPHONE ENCOUNTER
Sent in 60 mg which is the max for her  PLease see how her children are doing I found out they were unwell   I am not in office today but OK to schedule tomorrow as overbook incase

## 2023-05-03 NOTE — TELEPHONE ENCOUNTER
Patient advised   Vini Sanz is alittle better today , he had an ear infection , fever and wheezing ,he was put on an antibiotic and steroid  Her other son has congestion

## 2023-06-12 DIAGNOSIS — R00.0 TACHYCARDIA: ICD-10-CM

## 2023-06-12 DIAGNOSIS — I10 ESSENTIAL HYPERTENSION: ICD-10-CM

## 2023-06-12 RX ORDER — PROPRANOLOL HYDROCHLORIDE 20 MG/1
TABLET ORAL
Qty: 60 TABLET | Refills: 2 | Status: SHIPPED | OUTPATIENT
Start: 2023-06-12

## 2023-06-12 NOTE — CONSULTS
Future Appointments    Signatures   Electronically signed by : SIOBHAN Prater ; Vijay 15 2016  6:48PM EST                       (Author)
(M6) obeys commands

## 2023-07-18 ENCOUNTER — APPOINTMENT (EMERGENCY)
Dept: RADIOLOGY | Facility: HOSPITAL | Age: 23
End: 2023-07-18
Payer: COMMERCIAL

## 2023-07-18 ENCOUNTER — HOSPITAL ENCOUNTER (EMERGENCY)
Facility: HOSPITAL | Age: 23
Discharge: HOME | End: 2023-07-18
Attending: STUDENT IN AN ORGANIZED HEALTH CARE EDUCATION/TRAINING PROGRAM
Payer: COMMERCIAL

## 2023-07-18 ENCOUNTER — APPOINTMENT (EMERGENCY)
Dept: CT IMAGING | Facility: HOSPITAL | Age: 23
End: 2023-07-18
Payer: COMMERCIAL

## 2023-07-18 VITALS
HEART RATE: 100 BPM | RESPIRATION RATE: 18 BRPM | OXYGEN SATURATION: 97 % | DIASTOLIC BLOOD PRESSURE: 78 MMHG | WEIGHT: 293 LBS | SYSTOLIC BLOOD PRESSURE: 138 MMHG | TEMPERATURE: 98.1 F

## 2023-07-18 DIAGNOSIS — S81.012A KNEE LACERATION, LEFT, INITIAL ENCOUNTER: Primary | ICD-10-CM

## 2023-07-18 PROCEDURE — 74177 CT ABD & PELVIS W/CONTRAST: CPT

## 2023-07-18 PROCEDURE — 73564 X-RAY EXAM KNEE 4 OR MORE: CPT

## 2023-07-18 PROCEDURE — 72125 CT NECK SPINE W/O DYE: CPT

## 2023-07-18 PROCEDURE — 70450 CT HEAD/BRAIN W/O DYE: CPT

## 2023-07-18 PROCEDURE — 71260 CT THORAX DX C+: CPT

## 2023-07-18 RX ORDER — LIDOCAINE HYDROCHLORIDE 10 MG/ML
INJECTION, SOLUTION EPIDURAL; INFILTRATION; INTRACAUDAL; PERINEURAL
Status: COMPLETED
Start: 2023-07-18 | End: 2023-07-18

## 2023-07-18 RX ORDER — FENTANYL CITRATE 50 UG/ML
50 INJECTION, SOLUTION INTRAMUSCULAR; INTRAVENOUS ONCE
Status: DISCONTINUED | OUTPATIENT
Start: 2023-07-18 | End: 2023-07-18 | Stop reason: HOSPADM

## 2023-07-18 RX ORDER — SODIUM CHLORIDE, SODIUM GLUCONATE, SODIUM ACETATE, POTASSIUM CHLORIDE, MAGNESIUM CHLORIDE, SODIUM PHOSPHATE, DIBASIC, AND POTASSIUM PHOSPHATE .53; .5; .37; .037; .03; .012; .00082 G/100ML; G/100ML; G/100ML; G/100ML; G/100ML; G/100ML; G/100ML
1000 INJECTION, SOLUTION INTRAVENOUS ONCE
Status: COMPLETED | OUTPATIENT
Start: 2023-07-18 | End: 2023-07-18

## 2023-07-18 RX ORDER — LIDOCAINE HYDROCHLORIDE 10 MG/ML
5 INJECTION, SOLUTION EPIDURAL; INFILTRATION; INTRACAUDAL; PERINEURAL ONCE
Status: COMPLETED | OUTPATIENT
Start: 2023-07-18 | End: 2023-07-18

## 2023-07-18 RX ORDER — PROPRANOLOL HYDROCHLORIDE 20 MG/1
20 TABLET ORAL ONCE
Status: COMPLETED | OUTPATIENT
Start: 2023-07-18 | End: 2023-07-18

## 2023-07-18 RX ORDER — BACITRACIN, NEOMYCIN, POLYMYXIN B 400; 3.5; 5 [USP'U]/G; MG/G; [USP'U]/G
1 OINTMENT TOPICAL ONCE
Status: COMPLETED | OUTPATIENT
Start: 2023-07-18 | End: 2023-07-18

## 2023-07-18 RX ADMIN — SODIUM CHLORIDE, SODIUM GLUCONATE, SODIUM ACETATE, POTASSIUM CHLORIDE, MAGNESIUM CHLORIDE, SODIUM PHOSPHATE, DIBASIC, AND POTASSIUM PHOSPHATE 1000 ML: .53; .5; .37; .037; .03; .012; .00082 INJECTION, SOLUTION INTRAVENOUS at 17:57

## 2023-07-18 RX ADMIN — BACITRACIN ZINC, NEOMYCIN, POLYMYXIN B 1 SMALL APPLICATION: 400; 3.5; 5 OINTMENT TOPICAL at 18:30

## 2023-07-18 RX ADMIN — PROPRANOLOL HYDROCHLORIDE 20 MG: 20 TABLET ORAL at 18:10

## 2023-07-18 RX ADMIN — LIDOCAINE HYDROCHLORIDE 5 ML: 10 INJECTION, SOLUTION EPIDURAL; INFILTRATION; INTRACAUDAL; PERINEURAL at 17:30

## 2023-07-18 RX ADMIN — IOHEXOL 100 ML: 350 INJECTION, SOLUTION INTRAVENOUS at 14:23
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
## 2023-07-18 NOTE — DISCHARGE INSTR - AVS FIRST PAGE
Traumatic Laceration and Wound Care Instructions:     Wound Care:  - Wash laceration/wound daily, gently in the shower, do not scrub. Pat dry with clean towel. Do NOT immerse completely in water for 2 weeks   - Follow-up with the Trauma Service or your Family Physician in 10-14 days for suture removal  - Call office if you develop fever/chills, redness/swelling/drainage from the site. Additional Instructions:  - If you have any questions or concerns after discharge please call the office.  - Call office or return to ER if fever greater than 101, chills, increasing redness/swelling at site of laceration/wound, purulent or foul smelling drainage from laceration/wound, and/or worsening/uncontrollable pain.

## 2023-07-18 NOTE — PROCEDURES
Universal Protocol:  Consent: Verbal consent obtained. Risks and benefits: risks, benefits and alternatives were discussed  Consent given by: patient  Patient understanding: patient states understanding of the procedure being performed  Patient identity confirmed: verbally with patient    Laceration repair    Date/Time: 7/18/2023 6:08 PM    Performed by: Inez Granados MD  Authorized by: Inez Granados MD  Body area: lower extremity  Location details: left knee  Laceration length: 2 cm  Foreign bodies: no foreign bodies  Tendon involvement: none  Nerve involvement: none  Vascular damage: no  Anesthesia: local infiltration    Anesthesia:  Local Anesthetic: lidocaine 1% without epinephrine  Anesthetic total: 4 mL    Sedation:  Patient sedated: no      Wound Dehiscence:  Superficial Wound Dehiscence: simple closure      Procedure Details:  Preparation: Patient was prepped and draped in the usual sterile fashion.   Irrigation solution: saline  Irrigation method: syringe  Amount of cleaning: extensive  Debridement: none  Degree of undermining: none  Skin closure: 4-0 nylon  Number of sutures: 4  Technique: simple  Approximation difficulty: simple  Dressing: 4x4 sterile gauze and antibiotic ointment  Patient tolerance: patient tolerated the procedure well with no immediate complications
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
## 2023-07-18 NOTE — DISCHARGE INSTRUCTIONS
- Please have sutures removed in 7-10 days- you can have this done at primary doctor, urgent care or return to ED   - Please return to medical attention for development of fevers, redness, warmth, redness streaking up the leg, drainage or any other concerning symptoms around the wound

## 2023-07-21 ENCOUNTER — HOSPITAL ENCOUNTER (OUTPATIENT)
Dept: RADIOLOGY | Facility: HOSPITAL | Age: 23
Discharge: HOME | End: 2023-07-21
Attending: ORTHOPAEDIC SURGERY
Payer: OTHER GOVERNMENT

## 2023-07-21 ENCOUNTER — OFFICE VISIT (OUTPATIENT)
Dept: OBGYN CLINIC | Facility: CLINIC | Age: 23
End: 2023-07-21
Payer: OTHER GOVERNMENT

## 2023-07-21 ENCOUNTER — TELEPHONE (OUTPATIENT)
Dept: OBGYN CLINIC | Facility: CLINIC | Age: 23
End: 2023-07-21

## 2023-07-21 VITALS
SYSTOLIC BLOOD PRESSURE: 150 MMHG | HEIGHT: 70 IN | BODY MASS INDEX: 41.95 KG/M2 | WEIGHT: 293 LBS | HEART RATE: 92 BPM | DIASTOLIC BLOOD PRESSURE: 89 MMHG

## 2023-07-21 DIAGNOSIS — M23.92 INTERNAL DERANGEMENT OF LEFT KNEE: Primary | ICD-10-CM

## 2023-07-21 DIAGNOSIS — M23.92 INTERNAL DERANGEMENT OF LEFT KNEE: ICD-10-CM

## 2023-07-21 PROCEDURE — 99204 OFFICE O/P NEW MOD 45 MIN: CPT | Performed by: ORTHOPAEDIC SURGERY

## 2023-07-21 PROCEDURE — 73721 MRI JNT OF LWR EXTRE W/O DYE: CPT

## 2023-07-21 PROCEDURE — G1004 CDSM NDSC: HCPCS
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
## 2023-07-24 ENCOUNTER — TELEPHONE (OUTPATIENT)
Dept: OBGYN CLINIC | Facility: CLINIC | Age: 23
End: 2023-07-24

## 2023-07-24 NOTE — TELEPHONE ENCOUNTER
----- Message from Juan Rivera DO sent at 7/21/2023  4:28 PM EDT -----  I spoke with Loco and gave her the results of the MRI. She has a meniscus tear but no evidence of patellar tendon rupture    I would recommend follow-up with Dr. Roc Pollard next week to discuss treatment of the meniscus tear. If we time it up well we may be able to take her sutures out of her knee that were placed by the emergency room which can come out 10 to 14 days after they were put in. For now I told her that she no longer needs the immobilizer brace on her knee. She can continue with ice and compression of the knee and crutches only if needed.

## 2023-07-24 NOTE — RESULT ENCOUNTER NOTE
Patient was called and scheduled in our [place] office with Dr. Christos Alvarez tomorrow 07/25/23.

## 2023-07-25 ENCOUNTER — OFFICE VISIT (OUTPATIENT)
Dept: OBGYN CLINIC | Facility: CLINIC | Age: 23
End: 2023-07-25
Payer: OTHER GOVERNMENT

## 2023-07-25 VITALS
HEART RATE: 83 BPM | BODY MASS INDEX: 41.95 KG/M2 | HEIGHT: 70 IN | SYSTOLIC BLOOD PRESSURE: 138 MMHG | DIASTOLIC BLOOD PRESSURE: 94 MMHG | WEIGHT: 293 LBS

## 2023-07-25 DIAGNOSIS — S83.242D OTHER TEAR OF MEDIAL MENISCUS OF LEFT KNEE AS CURRENT INJURY, SUBSEQUENT ENCOUNTER: Primary | ICD-10-CM

## 2023-07-25 PROCEDURE — 99213 OFFICE O/P EST LOW 20 MIN: CPT | Performed by: ORTHOPAEDIC SURGERY
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
## 2023-07-29 DIAGNOSIS — G47.00 INSOMNIA, UNSPECIFIED TYPE: ICD-10-CM

## 2023-07-31 RX ORDER — TRAZODONE HYDROCHLORIDE 100 MG/1
TABLET ORAL
Qty: 90 TABLET | Refills: 0 | Status: SHIPPED | OUTPATIENT
Start: 2023-07-31 | End: 2023-09-07

## 2023-08-01 ENCOUNTER — OFFICE VISIT (OUTPATIENT)
Dept: OBGYN CLINIC | Facility: CLINIC | Age: 23
End: 2023-08-01
Payer: OTHER GOVERNMENT

## 2023-08-01 VITALS
DIASTOLIC BLOOD PRESSURE: 86 MMHG | WEIGHT: 293 LBS | SYSTOLIC BLOOD PRESSURE: 133 MMHG | HEART RATE: 74 BPM | HEIGHT: 70 IN | BODY MASS INDEX: 41.95 KG/M2

## 2023-08-01 DIAGNOSIS — S83.242D OTHER TEAR OF MEDIAL MENISCUS OF LEFT KNEE AS CURRENT INJURY, SUBSEQUENT ENCOUNTER: ICD-10-CM

## 2023-08-01 DIAGNOSIS — S16.1XXA ACUTE STRAIN OF NECK MUSCLE, INITIAL ENCOUNTER: Primary | ICD-10-CM

## 2023-08-01 PROBLEM — S83.242A TEAR OF MEDIAL MENISCUS OF LEFT KNEE, CURRENT: Status: ACTIVE | Noted: 2023-08-01

## 2023-08-01 PROCEDURE — 99214 OFFICE O/P EST MOD 30 MIN: CPT | Performed by: ORTHOPAEDIC SURGERY

## 2023-08-01 RX ORDER — CHLORHEXIDINE GLUCONATE 0.12 MG/ML
15 RINSE ORAL ONCE
OUTPATIENT
Start: 2023-08-01 | End: 2023-08-01
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
## 2023-08-03 ENCOUNTER — EVALUATION (OUTPATIENT)
Dept: PHYSICAL THERAPY | Facility: MEDICAL CENTER | Age: 23
End: 2023-08-03
Payer: OTHER GOVERNMENT

## 2023-08-03 DIAGNOSIS — S83.242D OTHER TEAR OF MEDIAL MENISCUS OF LEFT KNEE AS CURRENT INJURY, SUBSEQUENT ENCOUNTER: Primary | ICD-10-CM

## 2023-08-03 DIAGNOSIS — S16.1XXD ACUTE STRAIN OF NECK MUSCLE, SUBSEQUENT ENCOUNTER: ICD-10-CM

## 2023-08-03 PROCEDURE — 97161 PT EVAL LOW COMPLEX 20 MIN: CPT
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
## 2023-08-07 ENCOUNTER — APPOINTMENT (OUTPATIENT)
Dept: PHYSICAL THERAPY | Facility: MEDICAL CENTER | Age: 23
End: 2023-08-07
Payer: OTHER GOVERNMENT

## 2023-08-14 ENCOUNTER — OFFICE VISIT (OUTPATIENT)
Dept: PHYSICAL THERAPY | Facility: MEDICAL CENTER | Age: 23
End: 2023-08-14
Payer: OTHER GOVERNMENT

## 2023-08-14 DIAGNOSIS — S16.1XXD ACUTE STRAIN OF NECK MUSCLE, SUBSEQUENT ENCOUNTER: ICD-10-CM

## 2023-08-14 DIAGNOSIS — S83.242D OTHER TEAR OF MEDIAL MENISCUS OF LEFT KNEE AS CURRENT INJURY, SUBSEQUENT ENCOUNTER: Primary | ICD-10-CM

## 2023-08-14 PROCEDURE — 97140 MANUAL THERAPY 1/> REGIONS: CPT

## 2023-08-14 PROCEDURE — 97110 THERAPEUTIC EXERCISES: CPT

## 2023-08-14 NOTE — PROGRESS NOTES
Daily Note     Today's date: 2023  Patient name: Aleja Porter  : 2000  MRN: [id number]  Referring provider: Kenzie Krueger  Dx:   Encounter Diagnosis     ICD-10-CM    1. Other tear of medial meniscus of left knee as current injury, subsequent encounter  S83.242D       2. Acute strain of neck muscle, subsequent encounter  S16. 1XXD           Start Time:   Stop Time:   Total time in clinic (min): 28 minutes    Subjective: Patient reports her knee is loosing up however her neck still hurts the same. Objective: See treatment diary below      Assessment: Tolerated treatment well. Patient performs well with knee rom and quadriceps exercises. Motions kept within tolerance. Tightness remains at right sided cervical soft tissues along upper trap. Addressed with stretching and flexibility which patient tolerated well. Patient educated on hep to continue. Patient would benefit from continued PT. Plan: Continue per plan of care.       Precautions:   Past Medical History:   Diagnosis Date   • Anemia     with pregnancy , receive iron infusions   • Anxiety    • Chronic hip pain, left    • Chronic hypertension affecting pregnancy 2022   • Chronic hypertension with superimposed preeclampsia 2445   • Complication of anesthesia     pt reports required" more anesthesia "for her oral surgery and hand surgery to be effective   • Congenital pes planus of both feet     Resolved     • COVID-19     with pregnancy around 2020   • Difficulty walking     Resolved 2017 , secondary to hip left   • Eczema    • Hypertension     chronic HTN    • Mass of hand, left     Resolved 2017 , benign    • Obesity 2016   • Pain     chronic back pain   •  (spontaneous vaginal delivery) 2021   •  (spontaneous vaginal delivery) 2/15/2022   • Varicella     vaccine   • Visual impairment     eyewear           Manuals             stm right UT             Knee prom TE Neuro Re-Ed             saq 2x10            SLR 2x10            SL SLR 2x10            Chin tucks                                                    Ther Ex             Heel slides 10x5"            HSS 3x30"            Hamstring curls 20x            UT stretch 3x30"            Levator stretch  3x30"            Cervical arom              Ther Activity                                       Gait Training                                       Modalities

## 2023-08-17 ENCOUNTER — APPOINTMENT (OUTPATIENT)
Dept: PHYSICAL THERAPY | Facility: MEDICAL CENTER | Age: 23
End: 2023-08-17
Payer: OTHER GOVERNMENT

## 2023-08-21 ENCOUNTER — APPOINTMENT (OUTPATIENT)
Dept: PHYSICAL THERAPY | Facility: MEDICAL CENTER | Age: 23
End: 2023-08-21
Payer: OTHER GOVERNMENT

## 2023-08-28 ENCOUNTER — APPOINTMENT (OUTPATIENT)
Dept: PHYSICAL THERAPY | Facility: MEDICAL CENTER | Age: 23
End: 2023-08-28
Payer: OTHER GOVERNMENT

## 2023-09-05 ENCOUNTER — ANESTHESIA EVENT (OUTPATIENT)
Dept: PERIOP | Facility: HOSPITAL | Age: 23
End: 2023-09-05
Payer: OTHER GOVERNMENT

## 2023-09-05 ENCOUNTER — APPOINTMENT (OUTPATIENT)
Dept: LAB | Facility: HOSPITAL | Age: 23
End: 2023-09-05
Payer: OTHER GOVERNMENT

## 2023-09-05 DIAGNOSIS — S83.242D OTHER TEAR OF MEDIAL MENISCUS OF LEFT KNEE AS CURRENT INJURY, SUBSEQUENT ENCOUNTER: ICD-10-CM

## 2023-09-05 LAB
ALBUMIN SERPL BCP-MCNC: 4 G/DL (ref 3.5–5)
ALP SERPL-CCNC: 86 U/L (ref 34–104)
ALT SERPL W P-5'-P-CCNC: 16 U/L (ref 7–52)
ANION GAP SERPL CALCULATED.3IONS-SCNC: 7 MMOL/L
APTT PPP: 30 SECONDS (ref 23–37)
AST SERPL W P-5'-P-CCNC: 18 U/L (ref 13–39)
BILIRUB SERPL-MCNC: 0.59 MG/DL (ref 0.2–1)
BUN SERPL-MCNC: 12 MG/DL (ref 5–25)
CALCIUM SERPL-MCNC: 8.9 MG/DL (ref 8.4–10.2)
CHLORIDE SERPL-SCNC: 104 MMOL/L (ref 96–108)
CO2 SERPL-SCNC: 26 MMOL/L (ref 21–32)
CREAT SERPL-MCNC: 0.9 MG/DL (ref 0.6–1.3)
ERYTHROCYTE [DISTWIDTH] IN BLOOD BY AUTOMATED COUNT: 13.3 % (ref 11.6–15.1)
GFR SERPL CREATININE-BSD FRML MDRD: 89 ML/MIN/1.73SQ M
GLUCOSE P FAST SERPL-MCNC: 91 MG/DL (ref 65–99)
HCT VFR BLD AUTO: 36.9 % (ref 34.8–46.1)
HGB BLD-MCNC: 11.6 G/DL (ref 11.5–15.4)
INR PPP: 1.02 (ref 0.84–1.19)
MCH RBC QN AUTO: 26.5 PG (ref 26.8–34.3)
MCHC RBC AUTO-ENTMCNC: 31.4 G/DL (ref 31.4–37.4)
MCV RBC AUTO: 84 FL (ref 82–98)
PLATELET # BLD AUTO: 264 THOUSANDS/UL (ref 149–390)
PMV BLD AUTO: 9.8 FL (ref 8.9–12.7)
POTASSIUM SERPL-SCNC: 4.1 MMOL/L (ref 3.5–5.3)
PROT SERPL-MCNC: 7.2 G/DL (ref 6.4–8.4)
PROTHROMBIN TIME: 13.5 SECONDS (ref 11.6–14.5)
RBC # BLD AUTO: 4.38 MILLION/UL (ref 3.81–5.12)
SODIUM SERPL-SCNC: 137 MMOL/L (ref 135–147)
WBC # BLD AUTO: 6.27 THOUSAND/UL (ref 4.31–10.16)

## 2023-09-05 PROCEDURE — 85610 PROTHROMBIN TIME: CPT

## 2023-09-05 PROCEDURE — 36415 COLL VENOUS BLD VENIPUNCTURE: CPT

## 2023-09-05 PROCEDURE — 85730 THROMBOPLASTIN TIME PARTIAL: CPT

## 2023-09-05 PROCEDURE — 85027 COMPLETE CBC AUTOMATED: CPT

## 2023-09-05 PROCEDURE — 80053 COMPREHEN METABOLIC PANEL: CPT

## 2023-09-05 RX ORDER — DIPHENOXYLATE HYDROCHLORIDE AND ATROPINE SULFATE 2.5; .025 MG/1; MG/1
1 TABLET ORAL DAILY
COMMUNITY

## 2023-09-05 NOTE — PRE-PROCEDURE INSTRUCTIONS
Pre-Surgery Instructions:   Medication Instructions   • acetaminophen (TYLENOL) 325 mg tablet Uses PRN- OK to take day of surgery   • DULoxetine (CYMBALTA) 60 mg delayed release capsule Take night before surgery   • ibuprofen (MOTRIN) 600 mg tablet Stop taking 3 days prior to surgery. • loratadine (CLARITIN) 10 mg tablet Hold day of surgery. • methocarbamol (ROBAXIN) 750 mg tablet Hold day of surgery. • multivitamin (THERAGRAN) TABS Stop taking 3 days prior to surgery. • propranolol (INDERAL) 20 mg tablet Take day of surgery. • traZODone (DESYREL) 100 mg tablet Hold day of surgery. Medication instructions for day surgery reviewed. Please use only a sip of water to take your instructed medications. Avoid all over the counter vitamins, supplements and NSAIDS for one week prior to surgery per anesthesia guidelines. Tylenol is ok to take as needed. You will receive a call one business day prior to surgery with an arrival time and hospital directions. If your surgery is scheduled on a Monday, the hospital will be calling you on the Friday prior to your surgery. If you have not heard from anyone by 8pm, please call the hospital supervisor through the hospital  at [phone]. Luciana Mccallum [phone]). Do not eat or drink anything after midnight the night before your surgery, including candy, mints, lifesavers, or chewing gum. Do not drink alcohol 24hrs before your surgery. Try not to smoke at least 24hrs before your surgery. Follow the pre surgery showering instructions as listed in the [facility] Surgical Experience Booklet” or otherwise provided by your surgeon's office. Do not shave the surgical area 24 hours before surgery. Do not apply any lotions, creams, including makeup, cologne, deodorant, or perfumes after showering on the day of your surgery. No contact lenses, eye make-up, or artificial eyelashes.  Remove nail polish, including gel polish, and any artificial, gel, or acrylic nails if possible. Remove all jewelry including rings and body piercing jewelry. Wear causal clothing that is easy to take on and off. Consider your type of surgery. Keep any valuables, jewelry, piercings at home. Please bring any specially ordered equipment (sling, braces) if indicated. Arrange for a responsible person to drive you to and from the hospital on the day of your surgery. Visitor Guidelines discussed. Call the surgeon's office with any new illnesses, exposures, or additional questions prior to surgery. Please reference your [facility] Surgical Experience Booklet” for additional information to prepare for your upcoming surgery.

## 2023-09-07 ENCOUNTER — HOSPITAL ENCOUNTER (OUTPATIENT)
Facility: HOSPITAL | Age: 23
Setting detail: OUTPATIENT SURGERY
Discharge: HOME | End: 2023-09-07
Attending: ORTHOPAEDIC SURGERY | Admitting: ORTHOPAEDIC SURGERY
Payer: OTHER GOVERNMENT

## 2023-09-07 ENCOUNTER — ANESTHESIA (OUTPATIENT)
Dept: PERIOP | Facility: HOSPITAL | Age: 23
End: 2023-09-07
Payer: OTHER GOVERNMENT

## 2023-09-07 VITALS
SYSTOLIC BLOOD PRESSURE: 155 MMHG | WEIGHT: 293 LBS | DIASTOLIC BLOOD PRESSURE: 92 MMHG | OXYGEN SATURATION: 98 % | TEMPERATURE: 97.1 F | HEART RATE: 77 BPM | BODY MASS INDEX: 41.95 KG/M2 | HEIGHT: 70 IN | RESPIRATION RATE: 16 BRPM

## 2023-09-07 DIAGNOSIS — Z47.89 AFTERCARE FOLLOWING SURGERY OF THE MUSCULOSKELETAL SYSTEM: ICD-10-CM

## 2023-09-07 DIAGNOSIS — S83.242D TEAR OF MEDIAL MENISCUS OF LEFT KNEE, CURRENT, UNSPECIFIED TEAR TYPE, SUBSEQUENT ENCOUNTER: Primary | ICD-10-CM

## 2023-09-07 LAB
EXT PREGNANCY TEST URINE: NEGATIVE
EXT. CONTROL: NORMAL

## 2023-09-07 PROCEDURE — 29882 ARTHRS KNE SRG MNISC RPR M/L: CPT

## 2023-09-07 PROCEDURE — 81025 URINE PREGNANCY TEST: CPT | Performed by: ANESTHESIOLOGY

## 2023-09-07 PROCEDURE — C1713 ANCHOR/SCREW BN/BN,TIS/BN: HCPCS | Performed by: ORTHOPAEDIC SURGERY

## 2023-09-07 PROCEDURE — 99024 POSTOP FOLLOW-UP VISIT: CPT | Performed by: ORTHOPAEDIC SURGERY

## 2023-09-07 PROCEDURE — 29882 ARTHRS KNE SRG MNISC RPR M/L: CPT | Performed by: ORTHOPAEDIC SURGERY

## 2023-09-07 PROCEDURE — C9290 INJ, BUPIVACAINE LIPOSOME: HCPCS | Performed by: ANESTHESIOLOGY

## 2023-09-07 DEVICE — IMPLANTABLE DEVICE
Type: IMPLANTABLE DEVICE | Site: KNEE | Status: FUNCTIONAL
Brand: FIBERSTITCH™ IMPLANT, 24° CURVE WITH TWO POLYESTER IMPLANTS AND 2-0 FIBERWIRE® S

## 2023-09-07 RX ORDER — CHLORHEXIDINE GLUCONATE 0.12 MG/ML
15 RINSE ORAL ONCE
Status: COMPLETED | OUTPATIENT
Start: 2023-09-07 | End: 2023-09-07

## 2023-09-07 RX ORDER — ONDANSETRON 2 MG/ML
INJECTION INTRAMUSCULAR; INTRAVENOUS AS NEEDED
Status: DISCONTINUED | OUTPATIENT
Start: 2023-09-07 | End: 2023-09-07

## 2023-09-07 RX ORDER — SODIUM CHLORIDE, SODIUM LACTATE, POTASSIUM CHLORIDE, CALCIUM CHLORIDE 600; 310; 30; 20 MG/100ML; MG/100ML; MG/100ML; MG/100ML
125 INJECTION, SOLUTION INTRAVENOUS CONTINUOUS
Status: DISCONTINUED | OUTPATIENT
Start: 2023-09-07 | End: 2023-09-07 | Stop reason: HOSPADM

## 2023-09-07 RX ORDER — FENTANYL CITRATE 50 UG/ML
INJECTION, SOLUTION INTRAMUSCULAR; INTRAVENOUS AS NEEDED
Status: DISCONTINUED | OUTPATIENT
Start: 2023-09-07 | End: 2023-09-07

## 2023-09-07 RX ORDER — PROPOFOL 10 MG/ML
INJECTION, EMULSION INTRAVENOUS AS NEEDED
Status: DISCONTINUED | OUTPATIENT
Start: 2023-09-07 | End: 2023-09-07

## 2023-09-07 RX ORDER — ACETAMINOPHEN 500 MG
1000 TABLET ORAL EVERY 8 HOURS
Qty: 60 TABLET | Refills: 0 | Status: SHIPPED | OUTPATIENT
Start: 2023-09-07

## 2023-09-07 RX ORDER — LIDOCAINE HYDROCHLORIDE 10 MG/ML
INJECTION, SOLUTION EPIDURAL; INFILTRATION; INTRACAUDAL; PERINEURAL AS NEEDED
Status: DISCONTINUED | OUTPATIENT
Start: 2023-09-07 | End: 2023-09-07

## 2023-09-07 RX ORDER — METOCLOPRAMIDE HYDROCHLORIDE 5 MG/ML
10 INJECTION INTRAMUSCULAR; INTRAVENOUS ONCE AS NEEDED
Status: DISCONTINUED | OUTPATIENT
Start: 2023-09-07 | End: 2023-09-07 | Stop reason: HOSPADM

## 2023-09-07 RX ORDER — BUPIVACAINE HYDROCHLORIDE 5 MG/ML
INJECTION, SOLUTION EPIDURAL; INTRACAUDAL
Status: DISCONTINUED | OUTPATIENT
Start: 2023-09-07 | End: 2023-09-07

## 2023-09-07 RX ORDER — ACETAMINOPHEN 325 MG/1
650 TABLET ORAL EVERY 6 HOURS PRN
Status: CANCELLED | OUTPATIENT
Start: 2023-09-07

## 2023-09-07 RX ORDER — HYDROMORPHONE HCL/PF 1 MG/ML
0.5 SYRINGE (ML) INJECTION
Status: DISCONTINUED | OUTPATIENT
Start: 2023-09-07 | End: 2023-09-07 | Stop reason: HOSPADM

## 2023-09-07 RX ORDER — ONDANSETRON 2 MG/ML
4 INJECTION INTRAMUSCULAR; INTRAVENOUS EVERY 6 HOURS PRN
Status: CANCELLED | OUTPATIENT
Start: 2023-09-07

## 2023-09-07 RX ORDER — OXYCODONE HYDROCHLORIDE 5 MG/1
5 TABLET ORAL EVERY 4 HOURS PRN
Status: DISCONTINUED | OUTPATIENT
Start: 2023-09-07 | End: 2023-09-07 | Stop reason: HOSPADM

## 2023-09-07 RX ORDER — MIDAZOLAM HYDROCHLORIDE 2 MG/2ML
INJECTION, SOLUTION INTRAMUSCULAR; INTRAVENOUS AS NEEDED
Status: DISCONTINUED | OUTPATIENT
Start: 2023-09-07 | End: 2023-09-07

## 2023-09-07 RX ORDER — ONDANSETRON 4 MG/1
4 TABLET, FILM COATED ORAL EVERY 8 HOURS PRN
Qty: 20 TABLET | Refills: 0 | Status: SHIPPED | OUTPATIENT
Start: 2023-09-07

## 2023-09-07 RX ORDER — ONDANSETRON 2 MG/ML
4 INJECTION INTRAMUSCULAR; INTRAVENOUS ONCE AS NEEDED
Status: DISCONTINUED | OUTPATIENT
Start: 2023-09-07 | End: 2023-09-07 | Stop reason: HOSPADM

## 2023-09-07 RX ORDER — SODIUM CHLORIDE, SODIUM LACTATE, POTASSIUM CHLORIDE, CALCIUM CHLORIDE 600; 310; 30; 20 MG/100ML; MG/100ML; MG/100ML; MG/100ML
INJECTION, SOLUTION INTRAVENOUS CONTINUOUS PRN
Status: DISCONTINUED | OUTPATIENT
Start: 2023-09-07 | End: 2023-09-07

## 2023-09-07 RX ORDER — ALBUTEROL SULFATE 2.5 MG/3ML
2.5 SOLUTION RESPIRATORY (INHALATION) ONCE AS NEEDED
Status: DISCONTINUED | OUTPATIENT
Start: 2023-09-07 | End: 2023-09-07 | Stop reason: HOSPADM

## 2023-09-07 RX ORDER — KETOROLAC TROMETHAMINE 30 MG/ML
INJECTION, SOLUTION INTRAMUSCULAR; INTRAVENOUS AS NEEDED
Status: DISCONTINUED | OUTPATIENT
Start: 2023-09-07 | End: 2023-09-07

## 2023-09-07 RX ORDER — FENTANYL CITRATE/PF 50 MCG/ML
25 SYRINGE (ML) INJECTION
Status: DISCONTINUED | OUTPATIENT
Start: 2023-09-07 | End: 2023-09-07 | Stop reason: HOSPADM

## 2023-09-07 RX ORDER — DEXAMETHASONE SODIUM PHOSPHATE 10 MG/ML
INJECTION, SOLUTION INTRAMUSCULAR; INTRAVENOUS AS NEEDED
Status: DISCONTINUED | OUTPATIENT
Start: 2023-09-07 | End: 2023-09-07

## 2023-09-07 RX ORDER — MAGNESIUM HYDROXIDE 1200 MG/15ML
LIQUID ORAL AS NEEDED
Status: DISCONTINUED | OUTPATIENT
Start: 2023-09-07 | End: 2023-09-07 | Stop reason: HOSPADM

## 2023-09-07 RX ORDER — OXYCODONE HYDROCHLORIDE 5 MG/1
5 TABLET ORAL EVERY 6 HOURS PRN
Qty: 20 TABLET | Refills: 0 | Status: SHIPPED | OUTPATIENT
Start: 2023-09-07 | End: 2023-09-17

## 2023-09-07 RX ORDER — NAPROXEN 500 MG/1
500 TABLET ORAL 2 TIMES DAILY WITH MEALS
Qty: 20 TABLET | Refills: 0 | Status: SHIPPED | OUTPATIENT
Start: 2023-09-07

## 2023-09-07 RX ADMIN — BUPIVACAINE 10 ML: 13.3 INJECTION, SUSPENSION, LIPOSOMAL INFILTRATION at 10:40

## 2023-09-07 RX ADMIN — SODIUM CHLORIDE, SODIUM LACTATE, POTASSIUM CHLORIDE, AND CALCIUM CHLORIDE: .6; .31; .03; .02 INJECTION, SOLUTION INTRAVENOUS at 10:45

## 2023-09-07 RX ADMIN — PROPOFOL 200 MG: 10 INJECTION, EMULSION INTRAVENOUS at 10:50

## 2023-09-07 RX ADMIN — SODIUM CHLORIDE, SODIUM LACTATE, POTASSIUM CHLORIDE, AND CALCIUM CHLORIDE 125 ML/HR: .6; .31; .03; .02 INJECTION, SOLUTION INTRAVENOUS at 10:24

## 2023-09-07 RX ADMIN — ONDANSETRON 4 MG: 2 INJECTION INTRAMUSCULAR; INTRAVENOUS at 10:56

## 2023-09-07 RX ADMIN — FENTANYL CITRATE 25 MCG: 50 INJECTION, SOLUTION INTRAMUSCULAR; INTRAVENOUS at 10:55

## 2023-09-07 RX ADMIN — CHLORHEXIDINE GLUCONATE 15 ML: 1.2 SOLUTION ORAL at 10:25

## 2023-09-07 RX ADMIN — KETOROLAC TROMETHAMINE 15 MG: 30 INJECTION, SOLUTION INTRAMUSCULAR at 11:43

## 2023-09-07 RX ADMIN — MIDAZOLAM 2 MG: 1 INJECTION INTRAMUSCULAR; INTRAVENOUS at 10:37

## 2023-09-07 RX ADMIN — LIDOCAINE HYDROCHLORIDE 50 MG: 10 INJECTION, SOLUTION EPIDURAL; INFILTRATION; INTRACAUDAL at 10:50

## 2023-09-07 RX ADMIN — DEXAMETHASONE SODIUM PHOSPHATE 10 MG: 10 INJECTION, SOLUTION INTRAMUSCULAR; INTRAVENOUS at 10:56

## 2023-09-07 RX ADMIN — BUPIVACAINE HYDROCHLORIDE 10 ML: 5 INJECTION, SOLUTION EPIDURAL; INTRACAUDAL; PERINEURAL at 14:00

## 2023-09-07 RX ADMIN — FENTANYL CITRATE 25 MCG: 50 INJECTION, SOLUTION INTRAMUSCULAR; INTRAVENOUS at 11:12

## 2023-09-07 RX ADMIN — CEFAZOLIN 3000 MG: 1 INJECTION, POWDER, FOR SOLUTION INTRAMUSCULAR; INTRAVENOUS at 10:59

## 2023-09-07 RX ADMIN — FENTANYL CITRATE 25 MCG: 50 INJECTION, SOLUTION INTRAMUSCULAR; INTRAVENOUS at 11:01

## 2023-09-07 RX ADMIN — FENTANYL CITRATE 25 MCG: 50 INJECTION, SOLUTION INTRAMUSCULAR; INTRAVENOUS at 11:31
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
## 2023-09-07 NOTE — ANESTHESIA POSTPROCEDURE EVALUATION
Post-Op Assessment Note    CV Status:  Stable  Pain Score: 0    Pain management: adequate     Mental Status:  Alert and awake   Hydration Status:  Euvolemic   PONV Controlled:  Controlled   Airway Patency:  Patent      Post Op Vitals Reviewed: Yes      Staff: CRNA         No notable events documented.     BP   148/88   Temp   97.4   Pulse  82   Resp   16   SpO2   98

## 2023-09-07 NOTE — PERIOPERATIVE NURSING NOTE
Received patient from pacu aoo. vitals stable. patient Left knee surgical dressing CDI, knee immobilizer in place and ice applied; pulses palpable. patient denies pain or discomfort at this time and is tolerating sips ginger ale well. Patient resting in bed, call light in reach. Plan of care ongoing.

## 2023-09-07 NOTE — NURSING NOTE
Patient received a pre-op nerve block with Exparel,  Exparel blue band applied to right wrist in PACU.

## 2023-09-07 NOTE — ANESTHESIA PROCEDURE NOTES
Peripheral Block    Patient location during procedure: holding area  Start time: 9/7/2023 2:00 PM  Reason for block: at surgeon's request and post-op pain management  Staffing  Performed by: Jamie Villavicencio MD  Authorized by: Jamie Villavicencio MD    Preanesthetic Checklist  Completed: patient identified, IV checked, site marked, risks and benefits discussed, surgical consent, monitors and equipment checked, pre-op evaluation and timeout performed  Peripheral Block  Patient position: supine  Prep: ChloraPrep  Patient monitoring: continuous pulse oximetry and heart rate  Block type: Adductor Canal  Laterality: left  Injection technique: single-shot  Procedures: ultrasound guided, Ultrasound guidance required for the procedure to increase accuracy and safety of medication placement and decrease risk of complications.   Ultrasound permanent image savedbupivacaine (PF) (MARCAINE) 0.5 % injection 20 mL - Perineural   10 mL - 9/7/2023 2:00:00 PM  bupivacaine liposomal (EXPAREL) 1.3 % injection 20 mL - Perineural   10 mL - 9/7/2023 10:40:00 AM  Needle  Needle type: Stimuplex   Needle gauge: 22 G  Needle length: 4 in  Needle localization: ultrasound guidance  Assessment  Injection assessment: frequent aspiration, injected with ease, needle tip visualized at all times, incremental injection, negative for heart rate change, negative aspiration, no paresthesia on injection and no symptoms of intraneural/intravenous injection  Paresthesia pain: none  Post-procedure:  site cleaned  patient tolerated the procedure well with no immediate complications

## 2023-09-07 NOTE — DISCHARGE INSTR - AVS FIRST PAGE
Postoperative Instructions Following Knee Surgery    MEDICATIONS:  Resume all home medications unless otherwise instructed by your surgeon. Pain Medication:   Take Tylenol and Naproxen on prescribed schedule for 7 days  Take Oxycodone as needed for severe pain   If you were given a regional anesthetic (nerve block), it is helpful to take your pain medication before the block wear off. Possible side effects include nausea, constipation, and urinary retention. If you experience these side effects, please call our office for assistance. Pain med refills are authorized only during office hours (8am-4pm, Mon-Fri). Nausea Medication:   Zofran 4mg, take 1 tablet every 6 hours as needed for nausea or vomiting   Fill prescription ONLY if you expericnce severe nausea. Blood Clot Prevention:   Pump your foot up and down 20 times per hour while you are less mobile. Ambulate with your crutches at least once every hour   Wear lincoln stockings on both legs at all times except for hygiene for 2 weeks following surgery. Tip for smoother placement of stockings - place a plastic bag over the toes/foot then slide the stockings over the foot/ankle and remove the plastic bag. You may need another person to assist you. WOUND CARE:  Keep the dressing clean and dry. Light drainage may occur the first 2 days postop. You may remove the dressings and get the incision wet in the shower 72 hours after surgery. DO NOT remove steri-strips or sutures. DO NOT immerse the incision under water. Carefully pat the incision dry. If there is wound drainage, re-apply a fresh dry gauze dressing.   Please call our office ([phone]) if you experience either of the following:  Sudden increase in swelling, redness, or warmth at the surgical site  Excessive incisional drainage that persists beyond the 3rd day after surgery  Oral temperature greater than 101 degrees, not relieved with Tylenol  Shortness of breath, chest pain, nausea, or any Writer spoke with mother, she states understanding and agreement.    other concerning symptoms    Other pain/swelling control measures:  Cold Therapy:  Apply ice (20 min on, 20 min off) as often as you feel is necessary. Ice helps with pain. Elevation:  Elevate the entire leg above heart level. Place pillows under your ankle to keep your knee straight. This will help with swelling and prevents stiffness   Compression:  Keep an ace wrap on the operative leg until you return to the office. It may be removed to shower as described above. RANGE OF MOTION:  You are allowed LIMITED RANGE OF MOTION from 0 degrees (full extension) to 90 degrees of flexion    IMMOBILIZATION:  Your knee brace should be WORN AT ALL TIMES, including sleep. Keep the brace LOCKED IN EXTENSION FOR WALKING AND SLEEPING. You may unlock the brace while sitting. You may remove the brace only for showering. ACTIVITY:   BEAR FULL WEIGHT AS TOLERATED on the operative leg. Use crutches to assist only as needed. Using Crutches on Stairs:  Going up, lead with your "good" (nonoperative) leg. Going down, lead with your "bad" (operative) leg. Use a hand rail when available. Knee Extension:  Place a rolled towel or pillow under your ankle for 20-30 minutes 3-5 times per day. This will help to maintain full knee extension. Quad Sets:  Sit or lie with your knee straight. Tighten your quadriceps (front thigh) muscle. Hold for 3 seconds, then relax. Repeat 20 times per hour while awake. PHYSICAL THERAPY:  Begin therapy 1 TO 3 DAYS AFTER SURGERY. You were given a prescription for therapy at your preoperative office visit or on the day of surgery. If you do not have physical therapy scheduled yet, please call our office for assistance.     FOLLOW-UP APPOINTMENT:  10-14 days after surgery with:    Dr. Monica Humphrey MD    [address] [facility], [facility] [place] [address] [address]    [address] [address]    Phone:   [phone]

## 2023-09-07 NOTE — ANESTHESIA PREPROCEDURE EVALUATION
Procedure:  Arthroscopic meniscus repair, possible meniscectomy (Left: Knee)    Relevant Problems   CARDIO   (+) Essential hypertension        Physical Exam    Airway    Mallampati score: II  TM Distance: >3 FB  Neck ROM: full     Dental   No notable dental hx     Cardiovascular  Cardiovascular exam normal    Pulmonary  Pulmonary exam normal     Other Findings        Anesthesia Plan  ASA Score- 3     Anesthesia Type- general with ASA Monitors. Additional Monitors:   Airway Plan: LMA. Plan Factors-Exercise tolerance (METS): >4 METS. Chart reviewed. Existing labs reviewed. Patient summary reviewed. Patient is not a current smoker. Obstructive sleep apnea risk education given perioperatively. Induction- intravenous. Postoperative Plan- Plan for postoperative opioid use. Informed Consent- Anesthetic plan and risks discussed with patient. I personally reviewed this patient with the CRNA. Discussed and agreed on the Anesthesia Plan with the CRNA. Tati Montano
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
## 2023-09-12 ENCOUNTER — EVALUATION (OUTPATIENT)
Dept: PHYSICAL THERAPY | Facility: CLINIC | Age: 23
End: 2023-09-12
Payer: OTHER GOVERNMENT

## 2023-09-12 DIAGNOSIS — G89.18 POST-OPERATIVE PAIN: ICD-10-CM

## 2023-09-12 DIAGNOSIS — S83.242D OTHER TEAR OF MEDIAL MENISCUS OF LEFT KNEE AS CURRENT INJURY, SUBSEQUENT ENCOUNTER: Primary | ICD-10-CM

## 2023-09-12 DIAGNOSIS — M25.662 DECREASED ROM OF LEFT KNEE: ICD-10-CM

## 2023-09-12 PROCEDURE — 97161 PT EVAL LOW COMPLEX 20 MIN: CPT
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
## 2023-09-18 ENCOUNTER — OFFICE VISIT (OUTPATIENT)
Dept: PHYSICAL THERAPY | Facility: CLINIC | Age: 23
End: 2023-09-18
Payer: OTHER GOVERNMENT

## 2023-09-18 DIAGNOSIS — G89.18 POST-OPERATIVE PAIN: ICD-10-CM

## 2023-09-18 DIAGNOSIS — M25.662 DECREASED ROM OF LEFT KNEE: ICD-10-CM

## 2023-09-18 DIAGNOSIS — S83.242D OTHER TEAR OF MEDIAL MENISCUS OF LEFT KNEE AS CURRENT INJURY, SUBSEQUENT ENCOUNTER: Primary | ICD-10-CM

## 2023-09-18 PROCEDURE — 97110 THERAPEUTIC EXERCISES: CPT

## 2023-09-18 PROCEDURE — 97140 MANUAL THERAPY 1/> REGIONS: CPT

## 2023-09-18 NOTE — PROGRESS NOTES
Daily Note     Today's date: 2023  Patient name: Zainab Watkins  : 2000  MRN: [id number]  Referring provider: Jennifer Boggs*  Dx:   Encounter Diagnosis     ICD-10-CM    1. Other tear of medial meniscus of left knee as current injury, subsequent encounter  S83.242D       2. Post-operative pain  G89.18       3. Decreased ROM of left knee  M25.662           Start Time: 1750  Stop Time: 2207  Total time in clinic (min): 40 minutes    Subjective: Zainab Watkins arrived to session with 6/10 pain via VAS. Patient reported pain aggravation at work with prolonged standing and ambulation. Patient reported using ice at work to assist with joint effusion and pain. Patient verbalized compliance with HEP since previous session. Objective: See treatment diary below. Assessment: Patient tolerated treatment well today with no additional aggravation of pain. Patient tolerated incorporation of patellar mobilizations to improve knee mobility, reporting improved tolerance to passive assisted knee flexion while supine. Patient also tolerated standing exercises while still maintaining WBAT status and brace locked in extension. Patient educated by therapist on the importance of limb elevation at night to ensure edema reduction and performing ankle pumps while in bed to improve circulation. Patient instructed to continue HEP as previously prescribed, in which she verbalized adherence. Plan: Continue per plan of care. Progress treatment as tolerated. Progress treament per protocol. Precautions: WBAT for LLE. Access Code: [email or web address]  URL: [email or web address]  Date: 2023  Prepared by: Missy Plata     Exercises  - Supine Quad Set  - 1 x daily - 7 x weekly - 3 sets - 10 reps  - Active Straight Leg Raise with Quad Set  - 1 x daily - 7 x weekly - 3 sets - 10 reps  - Supine Heel Slides  - 1 x daily - 7 x weekly - 3 sets - 10 reps  - Supine Hamstring Stretch with Strap  - 1 x daily - 7 x weekly - 3 sets - 15-30 hold  - Long Sitting Calf Stretch with Strap  - 1 x daily - 7 x weekly - 3 sets - 15-30 hold  - Standing Weight Shift  - 1 x daily - 7 x weekly - 3 sets - 10 reps     9/18 IE  9/12            Manuals              Patellar Mobs  Flexion MWM; inf/sup/med/lat Grade 2-3             Supine Hip Add Stretch 3x30s             STM Medial Knee  performed                           Neuro Re-Ed              Lateral Weight Shifting  1x10 M/L with 0 UE support 1x10 A/P and L/R with 0 UE support                                                                                                 Ther Ex              Quad Sets  3x10 LLE only 3x10 LLE only            SLR 2x10 LLE only 2x10 LLE only            Heel Slides  2x10 LLE only; pillowcase under foot 2x10 LLE only; pillowcase under foot             Hamstring Str 3x30s with green strap 3x30s with green strap            Calf Str 3x30s with green strap 3x30s with green strap             Supine SAQ 2x10 LLE only              SL Hip Abd  2x10 LLE only              Standing Hip Ext 3x10 LLE only                             Education Educated on edema reduction via limb elevation and ankle pumps. Educated on skin inspection, movement into knee flexion/extension as tolerated, and maintaining brace locked in extension while sleeping.                                         Ther Activity                                          Gait Training                                          Modalities

## 2023-09-20 ENCOUNTER — APPOINTMENT (OUTPATIENT)
Dept: PHYSICAL THERAPY | Facility: CLINIC | Age: 23
End: 2023-09-20
Payer: OTHER GOVERNMENT

## 2023-09-22 ENCOUNTER — OFFICE VISIT (OUTPATIENT)
Dept: OBGYN CLINIC | Facility: CLINIC | Age: 23
End: 2023-09-22

## 2023-09-22 VITALS — HEIGHT: 70 IN | BODY MASS INDEX: 41.95 KG/M2 | WEIGHT: 293 LBS

## 2023-09-22 DIAGNOSIS — Z98.890 S/P MEDIAL MENISCUS REPAIR OF LEFT KNEE: Primary | ICD-10-CM

## 2023-09-22 DIAGNOSIS — S83.242D OTHER TEAR OF MEDIAL MENISCUS OF LEFT KNEE AS CURRENT INJURY, SUBSEQUENT ENCOUNTER: ICD-10-CM

## 2023-09-22 PROCEDURE — 99024 POSTOP FOLLOW-UP VISIT: CPT | Performed by: ORTHOPAEDIC SURGERY

## 2023-09-22 NOTE — PROGRESS NOTES
SUBJECTIVE:  Patient is a 20y.o. year old female who presents for follow up now POD 15 s/p Arthroscopic meniscus repair - Left performed on  9/7/2023. Today, the patient notes soreness along the medial aspect of the knee. Swelling has been slowly decreasing. She has been attending PT consistently per protocol and reports achieving full extension to about 40 degrees of flexion. She is weight bearing as tolerated with brace locked in extension. She has been compliant with brace wear. VITALS:  Vitals:       PHYSICAL EXAMINATION:  General: well developed and well nourished, alert, oriented times 3 and appears comfortable  Psychiatric: Normal    MUSCULOSKELETAL EXAMINATION:    Incision: Clean, dry, and intact. Sutures removed. Steri strips removed and reapplied today. Small area of healing ecchymosis around the medial arthroscopic portal site. Range of Motion: 0-30, resists further flexion due to pain  Mild medial joint line tenderness as expected POD15  Able to straight leg raise. No extensor lag. Neurovascular status: intact      PLAN:    We reviewed operative imaging and reviewed the procedure in detail. I believe the patient is progressing appropriately. Continue wearing the TROM at all times except for hygiene, keeping it locked in extension for ambulation and sleeping until she can demonstrate adequate quad strength and normalized gait. We emphasized the importance of having achieved full extension and encouraged the patient to continue working on progressing knee flexion with the goal of achieving at least 90 degrees of flexion by her next visit in 4 weeks. I recommended the following weight bearing status: as tolerated    Continue physical therapy per provided protocol with focus on continuing quad strength and further progressing knee flexion. Continue over-the-counter medications as needed for pain control. Follow up in 4 weeks.

## 2023-09-25 ENCOUNTER — OFFICE VISIT (OUTPATIENT)
Dept: PHYSICAL THERAPY | Facility: CLINIC | Age: 23
End: 2023-09-25
Payer: OTHER GOVERNMENT

## 2023-09-25 DIAGNOSIS — G89.18 POST-OPERATIVE PAIN: ICD-10-CM

## 2023-09-25 DIAGNOSIS — S83.242D OTHER TEAR OF MEDIAL MENISCUS OF LEFT KNEE AS CURRENT INJURY, SUBSEQUENT ENCOUNTER: Primary | ICD-10-CM

## 2023-09-25 DIAGNOSIS — M25.662 DECREASED ROM OF LEFT KNEE: ICD-10-CM

## 2023-09-25 PROCEDURE — 97110 THERAPEUTIC EXERCISES: CPT

## 2023-09-25 NOTE — PROGRESS NOTES
Daily Note     Today's date: 2023  Patient name: Meliton Jones  : 2000  MRN: [id number]  Referring provider: Cha Lawrence  Dx:   Encounter Diagnosis     ICD-10-CM    1. Other tear of medial meniscus of left knee as current injury, subsequent encounter  S83.242D       2. Post-operative pain  G89.18       3. Decreased ROM of left knee  M25.662           Start Time: 1630  Stop Time: 1700  Total time in clinic (min): 30 minutes    Subjective: Patient arrived to treatment session 15 minutes late due to transportation issues from work to PT office. Meliton Jones arrived to treatment session today with stiffness near the medial knee with associated swelling near the medial joint line. Denied any pain since previous session. Objective: See treatment diary below. Assessment: Patient is entering 3 weeks post-op left medial meniscal repair and is performing PT exercises in accordance to rehab protocol. Patient tolerated previously prescribed exercises with no pain aggravation, just sensation of increased pressure near the medial joint line and retinaculum. Patient practiced movements on table with no brace with both open and closed chain movements to improve left knee ROM, LE strengthening, and swelling. To assist with self-management of diagnosis, patient was educated by therapist on how to complete patellar mobilizations to the knee while completing heel slides to further improve knee flexion. Patient continues to display improvements in A/PROM of the left knee with no pain aggravation at end ranges of attainable ROM. Patient would benefit from continued skilled PT to further address limitations within tolerance. Plan: Continue per plan of care. Progress treament per protocol. Precautions: WBAT for LLE. Access Code: [email or web address]  URL: [email or web address]  Date: 2023  Prepared by: Susie Ulloa     Exercises  - Supine Quad Set  - 1 x daily - 7 x weekly - 3 sets - 10 reps  - Active Straight Leg Raise with Quad Set  - 1 x daily - 7 x weekly - 3 sets - 10 reps  - Supine Heel Slides  - 1 x daily - 7 x weekly - 3 sets - 10 reps  - Supine Hamstring Stretch with Strap  - 1 x daily - 7 x weekly - 3 sets - 15-30 hold  - Long Sitting Calf Stretch with Strap  - 1 x daily - 7 x weekly - 3 sets - 15-30 hold  - Standing Weight Shift  - 1 x daily - 7 x weekly - 3 sets - 10 reps    Date 9/25 9/18 IE  9/12            Visit 3 2 1                           Manuals               Patellar Mobs  Taught self patellar mobs to patient  Flexion MWM; inf/sup/med/lat Grade 2-3             Supine Hip Add Stretch  3x30s             STM Medial Knee   performed             Knee PROM 2x10 with OP @ end range flexion                Neuro Re-Ed               Lateral Weight Shifting   1x10 M/L with 0 UE support 1x10 A/P and L/R with 0 UE support                                                                                                       Ther Ex               Bridges 2x10 with knees <90 flexion              Quad Sets   3x10 LLE only 3x10 LLE only            SLR 2x10 LLE only 2x10 LLE only 2x10 LLE only            Heel Slides  2x10 LLE only; pillowcase under foot 2x10 LLE only; pillowcase under foot 2x10 LLE only; pillowcase under foot             Hamstring Str  3x30s with green strap 3x30s with green strap            Calf Str 3x30s with GS 3x30s with green strap 3x30s with green strap             Supine SAQ  2x10 LLE only              SL Hip Abd  2x10 LLE only 2x10 LLE only              Standing Hip Ext  3x10 LLE only              Prone HS Curls 2x10 LLE only               Education Educated on self patellar mobs to assist with knee flexion when completing heel slides supine on table. Educated on edema reduction via limb elevation and ankle pumps. Educated on skin inspection, movement into knee flexion/extension as tolerated, and maintaining brace locked in extension while sleeping. Ther Activity                                             Gait Training                                             Modalities

## 2023-09-27 ENCOUNTER — OFFICE VISIT (OUTPATIENT)
Dept: PHYSICAL THERAPY | Facility: CLINIC | Age: 23
End: 2023-09-27
Payer: OTHER GOVERNMENT

## 2023-09-27 DIAGNOSIS — S83.242D OTHER TEAR OF MEDIAL MENISCUS OF LEFT KNEE AS CURRENT INJURY, SUBSEQUENT ENCOUNTER: Primary | ICD-10-CM

## 2023-09-27 DIAGNOSIS — G89.18 POST-OPERATIVE PAIN: ICD-10-CM

## 2023-09-27 DIAGNOSIS — M25.662 DECREASED ROM OF LEFT KNEE: ICD-10-CM

## 2023-09-27 PROCEDURE — 97110 THERAPEUTIC EXERCISES: CPT

## 2023-09-27 NOTE — PROGRESS NOTES
Daily Note     Today's date: 2023  Patient name: Doris Sanchez  : 2000  MRN: [id number]  Referring provider: Mirza Mckeon*  Dx:   Encounter Diagnosis     ICD-10-CM    1. Other tear of medial meniscus of left knee as current injury, subsequent encounter  S83.242D       2. Post-operative pain  G89.18       3. Decreased ROM of left knee  M25.662           Start Time: 4415  Stop Time: 1700  Total time in clinic (min): 45 minutes    Subjective: Doris Sanchez arrived to treatment session with denial of pain. No issues reported with use of brace and maintaining it locked in extension as per protocol. Patient is compliant with HEP completion on a daily basis. Objective: See treatment diary below      Assessment: Tolerated treatment well today with review of previously prescribed exercises with incorporation of new exercises to improve knee ROM and hip strength. Patient responded well to new exercises with minor medial knee pressure along the joint line, but no pain reported. Patient continues to demonstrate improvement with knee A/PROM with active knee flexion at 85 degrees and passive at 90 degrees. Patient is able to complete terminal knee extension to 0 degrees with no complications. Discussed with patient to dial back visit frequency to 1x per week until protocol moves to next phase due to current level. Patient verbalized comprehension and adherence to therapist's recommendations. Plan: Continue per plan of care. Progress treament per protocol. Precautions: WBAT for LLE. Access Code: [email or web address]  URL: [email or web address]  Date: 2023  Prepared by: Zofia Chawla     Exercises  - Supine Quad Set  - 1 x daily - 7 x weekly - 3 sets - 10 reps  - Active Straight Leg Raise with Quad Set  - 1 x daily - 7 x weekly - 3 sets - 10 reps  - Supine Heel Slides  - 1 x daily - 7 x weekly - 3 sets - 10 reps  - Supine Hamstring Stretch with Strap  - 1 x daily - 7 x weekly - 3 sets - 15-30 hold  - Long Sitting Calf Stretch with Strap  - 1 x daily - 7 x weekly - 3 sets - 15-30 hold  - Standing Weight Shift  - 1 x daily - 7 x weekly - 3 sets - 10 reps    Date 9/27 9/25 9/18 IE  9/12   Visit 4 3 2 1          Manuals       Patellar Mobs   Taught self patellar mobs to patient  Flexion MWM; inf/sup/med/lat Grade 2-3    Supine Hip Add Stretch   3x30s    STM Medial Knee    performed    Knee PROM  2x10 with OP @ end range flexion       Neuro Re-Ed       Lateral Weight Shifting    1x10 M/L with 0 UE support 1x10 A/P and L/R with 0 UE support                                              Ther Ex       Sustained Knee Bend  2x30s  2x60s with blue strap attached to table with progressive inc knee flexion      LAQ 3x10 LLE       Bridges 3x10 with knees ~90 flexion 2x10 with knees <90 flexion     Quad Sets  2x10  3x10 LLE only 3x10 LLE only   SLR 3x10 LLE  2x10 LLE only 2x10 LLE only 2x10 LLE only   Heel Slides  3x10 LLE only w/ sliding board and GS;   Obtained 80 degrees PROM 2x10 LLE only; pillowcase under foot 2x10 LLE only; pillowcase under foot 2x10 LLE only; pillowcase under foot    Hamstring Str 3x30s with GS  3x30s with green strap 3x30s with green strap   Calf Str 3x30s with GS 3x30s with GS 3x30s with green strap 3x30s with green strap    Supine SAQ   2x10 LLE only     SL Hip Abd   2x10 LLE only 2x10 LLE only     Standing Hip Abd 3x10 with 1 UE support      Standing Hip Ext 3x10 with 1 UE support  3x10 LLE only     Heel Raises 2x10 B       Ankle [place]  30x CW/CCW      Prone HS Curls 3x10 LLE with MWM into terminal knee flexion 2x10 LLE only      Education  Educated on self patellar mobs to assist with knee flexion when completing heel slides supine on table. Educated on edema reduction via limb elevation and ankle pumps. Educated on skin inspection, movement into knee flexion/extension as tolerated, and maintaining brace locked in extension while sleeping.                  Ther Activity Gait Training                     Modalities

## 2023-10-02 ENCOUNTER — OFFICE VISIT (OUTPATIENT)
Dept: PHYSICAL THERAPY | Facility: CLINIC | Age: 23
End: 2023-10-02
Payer: OTHER GOVERNMENT

## 2023-10-02 DIAGNOSIS — M25.662 DECREASED ROM OF LEFT KNEE: ICD-10-CM

## 2023-10-02 DIAGNOSIS — G89.18 POST-OPERATIVE PAIN: ICD-10-CM

## 2023-10-02 DIAGNOSIS — S83.242D OTHER TEAR OF MEDIAL MENISCUS OF LEFT KNEE AS CURRENT INJURY, SUBSEQUENT ENCOUNTER: Primary | ICD-10-CM

## 2023-10-02 PROCEDURE — 97110 THERAPEUTIC EXERCISES: CPT

## 2023-10-02 PROCEDURE — 97140 MANUAL THERAPY 1/> REGIONS: CPT

## 2023-10-02 NOTE — PROGRESS NOTES
Daily Note     Today's date: 10/2/2023  Patient name: Rocky Sterling  : 2000  MRN: [id number]  Referring provider: Roxanne Carson  Dx:   Encounter Diagnosis     ICD-10-CM    1. Other tear of medial meniscus of left knee as current injury, subsequent encounter  S83.242D       2. Post-operative pain  G89.18       3. Decreased ROM of left knee  M25.662           Start Time: 9638  Stop Time: 1700  Total time in clinic (min): 45 minutes    Subjective: Rocky Sterling arrived to session with denial of pain symptoms and no issues with HEP. Reported fatigue in the left hip and left low back due to extensive walking at work. Objective: See treatment diary below      Assessment: Patient tolerated treatment session well today with minimal aggravation of pain symptoms. Patient only experienced aggravation at the end of available ROM for knee flexion with overpressure along the medial joint line. Patient completed previously prescribed exercises with focus of LE strengthening, ROM, and stability. Patient tolerated progression of exercises with use of ankle weights and prolonged positional holds for improved muscle activation. Patient's incision site presents with minimal scabbing and edema formation. Patient reported compliance to following protocol when it came to maintaining knee brace locked in extension, especially at night when sleeping. Discussed with patient available options for exercises at current time and plan to discuss rehab protocol with MD to determine surgical recovery and progress with PT. Instructed patient to continue current HEP with new modifications and assess tolerance for next session. Plan: Continue per plan of care. Progress treament per protocol. Precautions: WBAT for LLE. Access Code: [email or web address]  URL: [email or web address]  Date: 2023  Prepared by: Markie Carnes     Exercises  - Supine Quad Set  - 1 x daily - 7 x weekly - 3 sets - 10 reps  - Active Straight Leg Raise with Quad Set  - 1 x daily - 7 x weekly - 3 sets - 10 reps  - Supine Heel Slides  - 1 x daily - 7 x weekly - 3 sets - 10 reps  - Supine Hamstring Stretch with Strap  - 1 x daily - 7 x weekly - 3 sets - 15-30 hold  - Long Sitting Calf Stretch with Strap  - 1 x daily - 7 x weekly - 3 sets - 15-30 hold  - Standing Weight Shift  - 1 x daily - 7 x weekly - 3 sets - 10 reps    Date 10/2 9/27 9/25 9/18 IE  9/12   Visit 5 4 3 2 1           Manuals        Patellar Mobs  3x10 G2-4 while moving into knee flexion  Taught self patellar mobs to patient  Flexion MWM; inf/sup/med/lat Grade 2-3    Supine Hip Add Stretch    3x30s    STM Medial Knee  Performed x 4 min   performed    Knee PROM 3x10 with OP @ end range of motion flexion  2x10 with OP @ end range flexion       Neuro Re-Ed        Lateral Weight Shifting     1x10 M/L with 0 UE support 1x10 A/P and L/R with 0 UE support                                                    Ther Ex        Sustained Knee Bend   2x30s  2x60s with blue strap attached to table with progressive inc knee flexion      LAQ 3x10 LLE w/ 2# AW 3x10 LLE       Bridges 3x10 with 3s hold knees ~90 flexion 3x10 with knees ~90 flexion 2x10 with knees <90 flexion     Quad Sets  2x10 2x10  3x10 LLE only 3x10 LLE only   SLR 3x10 LLE  3x10 LLE  2x10 LLE only 2x10 LLE only 2x10 LLE only   Heel Slides  2x10 LLE only w/ sliding board and GS  3x10 LLE only w/ sliding board and GS;   Obtained 80 degrees PROM 2x10 LLE only; pillowcase under foot 2x10 LLE only; pillowcase under foot 2x10 LLE only; pillowcase under foot    Hamstring Str  3x30s with GS  3x30s with green strap 3x30s with green strap   Calf Str  3x30s with GS 3x30s with GS 3x30s with green strap 3x30s with green strap    Supine SAQ    2x10 LLE only     SL Hip Abd  3x10 LLE   2x10 LLE only 2x10 LLE only     Standing Hip Abd  3x10 with 1 UE support      Standing Hip Ext  3x10 with 1 UE support  3x10 LLE only     Heel Raises 3x10 B 2x10 B Ankle [place]   30x CW/CCW      Prone HS Curls  3x10 LLE with MWM into terminal knee flexion 2x10 LLE only      Education Discussed attempting heel slides against wall for gravity-assisted knee flexion with strap. Educated on self patellar mobs to assist with knee flexion when completing heel slides supine on table. Educated on edema reduction via limb elevation and ankle pumps. Educated on skin inspection, movement into knee flexion/extension as tolerated, and maintaining brace locked in extension while sleeping.    DKTC 3x10 with GPB + GS               Ther Activity                        Gait Training                        Modalities

## 2023-10-04 ENCOUNTER — APPOINTMENT (OUTPATIENT)
Dept: PHYSICAL THERAPY | Facility: CLINIC | Age: 23
End: 2023-10-04
Payer: OTHER GOVERNMENT

## 2023-10-09 ENCOUNTER — OFFICE VISIT (OUTPATIENT)
Dept: PHYSICAL THERAPY | Facility: CLINIC | Age: 23
End: 2023-10-09
Payer: OTHER GOVERNMENT

## 2023-10-09 DIAGNOSIS — M25.662 DECREASED ROM OF LEFT KNEE: ICD-10-CM

## 2023-10-09 DIAGNOSIS — S83.242D OTHER TEAR OF MEDIAL MENISCUS OF LEFT KNEE AS CURRENT INJURY, SUBSEQUENT ENCOUNTER: Primary | ICD-10-CM

## 2023-10-09 DIAGNOSIS — G89.18 POST-OPERATIVE PAIN: ICD-10-CM

## 2023-10-09 PROCEDURE — 97112 NEUROMUSCULAR REEDUCATION: CPT

## 2023-10-09 PROCEDURE — 97110 THERAPEUTIC EXERCISES: CPT

## 2023-10-09 NOTE — PROGRESS NOTES
Daily Note     Today's date: 10/9/2023  Patient name: Javier Anand  : 2000  MRN: [id number]  Referring provider: Demi Reyes  Dx:   Encounter Diagnosis     ICD-10-CM    1. Other tear of medial meniscus of left knee as current injury, subsequent encounter  S83.242D       2. Post-operative pain  G89.18       3. Decreased ROM of left knee  M25.662           Start Time: 4838  Stop Time: 1700  Total time in clinic (min): 45 minutes    Subjective: Javier Anand arrived to treatment session with denial of pain symptoms since previous visit. Reported HEP compliance over the weekend. Patient reported that she doffed her brace before coming over to session. Objective: See treatment diary below      Assessment: Tolerated treatment well. Incorporated standing exercises with focus on LE strengthening, stability, and CKC AROM. Patient did not exceed CKC knee flexion AROM past 90 degrees to maintain protocol while trying to encourage increased weightbearing as tolerated. Patient encouraged to utilize brace throughout day unlocked to assess tolerance to prolonged standing and weightbearing. Updated HEP today to reflect patient's progress and transition towards more standing exercises as tolerated. Patient demonstrated fatigue post treatment, exhibited good technique with therapeutic exercises and would benefit from continued PT      Plan: Continue per plan of care. Progress treament per protocol. Precautions: WBAT for LLE. 4 weeks post-op. Access Code: [email or web address]  URL: [email or web address]  Date: 10/09/2023  Prepared by: Chauncey Martin    Exercises  - Supine Heel Slides  - 1-2 x daily - 7 x weekly - 3 sets - 10 reps  - Standing Knee Flexion AROM with Chair Support  - 1-2 x daily - 7 x weekly - 3 sets - 10 reps  - Mini Squat with Counter Support  - 1-2 x daily - 7 x weekly - 3 sets - 10 reps  - Step Up  - 1-2 x daily - 7 x weekly - 3 sets - 10 reps  - Lateral Step Down  - 1 x daily - 7 x weekly - 3 sets - 10 reps  - Side Stepping with Counter Support  - 1-2 x daily - 7 x weekly - 6 sets - 10 reps  - Gastroc Stretch on Wall  - 1-2 x daily - 7 x weekly - 3 sets - 30 hold  - Walking Hamstring Stretch  - 1-2 x daily - 7 x weekly - 3 sets - 30 hold  - Standing Single Leg Stance with Counter Support  - 1-2 x daily - 7 x weekly - 3 sets - 30 hold  - Wall Sit  - 1-2 x daily - 7 x weekly - 3 sets - 30 hold    Date 10/9 10/2 9/27 9/25 9/18 IE  9/12   Visit 6 5 4 3 2 1            Manuals         Patellar Mobs   3x10 G2-4 while moving into knee flexion  Taught self patellar mobs to patient  Flexion MWM; inf/sup/med/lat Grade 2-3    Supine Hip Add Stretch     3x30s    STM Medial Knee   Performed x 4 min   performed    Knee PROM  3x10 with OP @ end range of motion flexion  2x10 with OP @ end range flexion       Neuro Re-Ed         Lateral Weight Shifting      1x10 M/L with 0 UE support 1x10 A/P and L/R with 0 UE support    SLS 3x30s        Sidesteps on airex beam 6 RTs x10ft in mini squats        Mini Squats  3x10         Step Ups 2x10 on 6" step        Lat Quad Dip 2x10 on 4" step        SL RDL 2x10         Wall Sits 3x30s knee flexion < 90         Ther Ex         Sustained Knee Bend    2x30s  2x60s with blue strap attached to table with progressive inc knee flexion      LAQ 3x10 with 2s hold BLE w/ 2# AW 3x10 LLE w/ 2# AW 3x10 LLE       Bridges 3x10 with 3s hold knees @ 90 flexion 3x10 with 3s hold knees ~90 flexion 3x10 with knees ~90 flexion 2x10 with knees <90 flexion     Quad Sets   2x10 2x10  3x10 LLE only 3x10 LLE only   SLR  3x10 LLE  3x10 LLE  2x10 LLE only 2x10 LLE only 2x10 LLE only   Heel Slides  3x10 LLE on GPB + GS; SKTC 2x10 LLE only w/ sliding board and GS  3x10 LLE only w/ sliding board and GS;   Obtained 80 degrees PROM 2x10 LLE only; pillowcase under foot 2x10 LLE only; pillowcase under foot 2x10 LLE only; pillowcase under foot    Hamstring Str 2x30s  3x30s with GS  3x30s with green strap 3x30s with green strap   Calf Str 2x30s  3x30s with GS 3x30s with GS 3x30s with green strap 3x30s with green strap    Supine SAQ     2x10 LLE only     SL Hip Abd   3x10 LLE   2x10 LLE only 2x10 LLE only     Standing Hip Abd   3x10 with 1 UE support      Standing Hip Ext   3x10 with 1 UE support  3x10 LLE only     Heel Raises 3x10 B 3x10 B 2x10 B       Ankle [place]    30x CW/CCW      Standing HS Curls 3x10 B        Prone HS Curls   3x10 LLE with MWM into terminal knee flexion 2x10 LLE only      Education Discussed use of brace in unlocked to ensure surgical integrity with use of standing exercises as tolerated by patient to improve fxn'l mobility Discussed attempting heel slides against wall for gravity-assisted knee flexion with strap. Educated on self patellar mobs to assist with knee flexion when completing heel slides supine on table. Educated on edema reduction via limb elevation and ankle pumps. Educated on skin inspection, movement into knee flexion/extension as tolerated, and maintaining brace locked in extension while sleeping.    DKTC  3x10 with GPB + GS                Ther Activity                           Gait Training                           Modalities

## 2023-10-16 ENCOUNTER — APPOINTMENT (OUTPATIENT)
Dept: PHYSICAL THERAPY | Facility: CLINIC | Age: 23
End: 2023-10-16
Payer: OTHER GOVERNMENT
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
## 2023-10-17 ENCOUNTER — OFFICE VISIT (OUTPATIENT)
Dept: OBGYN CLINIC | Facility: CLINIC | Age: 23
End: 2023-10-17

## 2023-10-17 ENCOUNTER — TELEPHONE (OUTPATIENT)
Age: 23
End: 2023-10-17

## 2023-10-17 VITALS
HEIGHT: 70 IN | SYSTOLIC BLOOD PRESSURE: 150 MMHG | DIASTOLIC BLOOD PRESSURE: 90 MMHG | HEART RATE: 70 BPM | BODY MASS INDEX: 41.95 KG/M2 | WEIGHT: 293 LBS

## 2023-10-17 DIAGNOSIS — Z98.890 S/P MEDIAL MENISCUS REPAIR OF LEFT KNEE: Primary | ICD-10-CM

## 2023-10-17 NOTE — TELEPHONE ENCOUNTER
Caller: Roldan Simeon      Doctor: Judith Patino      Reason for call: Patient called stating she was running late for her appt advise her she has a 15 min veena period to arrive .   Attempted to reschedule the pt she stated she will reschedule at the office      Call back#: N/A

## 2023-10-17 NOTE — PROGRESS NOTES
SUBJECTIVE:  Patient is a 20y.o. year old female who presents for follow up now nearly 6 weeks s/p Arthroscopic meniscus repair - Left performed on  9/7/2023. Today, the patient reports she is doing very well and is pleased with her progress so far. She reports an intermittent "twinge" of pain but is becoming less frequent and does not cause her any significant dysfunction. Patient has begun to to self discontinue her brace already. She has been ambulating without any pain or difficulties. Merly De La Cruz denies swelling, instability, locking, weakness, or numbness/tingling. The patient has returned to work without any issues at this time. VITALS:  Vitals:    10/17/23 0821   BP: 150/90   Pulse: 70       PHYSICAL EXAMINATION:  General: well developed and well nourished, alert, oriented times 3, and appears comfortable  Psychiatric: Normal    MUSCULOSKELETAL EXAMINATION:    Incision: well-healed  Range of Motion: 0-120 without pain, symmetric to contralateral  No significant effusion  No instability  Minimal medial joint line tenderness  Neurovascular status: intact      PLAN:    I believe the patient is continuing to progress appropriately. She can continue to wean out of the TROM as comfortable. Continue physical therapy per provided protocol. We discussed that the patient can remain cautious about deep flexion as this position does put more pressure on the healing meniscus, but otherwise, she can continue to progress with her protocol. Continue OTC medications as needed for pain control. Follow up in 6 weeks for next post-op visit.      PROMs review: No response to patient IQ surveys

## 2023-10-26 DIAGNOSIS — F41.9 ANXIETY: ICD-10-CM

## 2023-10-26 RX ORDER — DULOXETIN HYDROCHLORIDE 60 MG/1
CAPSULE, DELAYED RELEASE ORAL
Qty: 90 CAPSULE | Refills: 0 | Status: SHIPPED | OUTPATIENT
Start: 2023-10-26

## 2023-10-30 ENCOUNTER — APPOINTMENT (OUTPATIENT)
Dept: PHYSICAL THERAPY | Facility: CLINIC | Age: 23
End: 2023-10-30
Payer: OTHER GOVERNMENT

## 2024-08-06 ENCOUNTER — TELEPHONE (OUTPATIENT)
Dept: FAMILY MEDICINE CLINIC | Facility: CLINIC | Age: 24
End: 2024-08-06

## 2024-08-06 NOTE — TELEPHONE ENCOUNTER
Ariadna Mcnulty MD   to [facility]       8/6/24  3:17 PM  OK todo virtual  Dolly Barrios   to Ariadna Mcnulty MD         8/6/24  3:01 PM  Understandable! Let me know if a virtual or phone visit would work to. I will do my best to make anything work, but I am working all week.   Ariadna Mcnulty MD   to [facility]       8/6/24  2:56 PM  Med/ BP check needed; if cancellation please consider  Ariadna Mcnulty MD   to Dolly Barrios         8/6/24  2:56 PM  I can fill it out. But it usually ask the last time we saw eachother and it say 2/6/2023. That won't be valid.! I can't make promises to get you in this week but I can get the ladies to call you if there is a cancellation?    Last read by Dolly Barrios at  5:53 PM on 8/6/2

## 2024-08-09 ENCOUNTER — OFFICE VISIT (OUTPATIENT)
Dept: FAMILY MEDICINE CLINIC | Facility: CLINIC | Age: 24
End: 2024-08-09
Payer: OTHER GOVERNMENT

## 2024-08-09 VITALS
WEIGHT: 293 LBS | DIASTOLIC BLOOD PRESSURE: 80 MMHG | OXYGEN SATURATION: 99 % | TEMPERATURE: 97.6 F | RESPIRATION RATE: 12 BRPM | HEIGHT: 70 IN | BODY MASS INDEX: 41.95 KG/M2 | HEART RATE: 104 BPM | SYSTOLIC BLOOD PRESSURE: 140 MMHG

## 2024-08-09 DIAGNOSIS — I10 ESSENTIAL HYPERTENSION: Primary | ICD-10-CM

## 2024-08-09 DIAGNOSIS — Z13.29 SCREENING FOR THYROID DISORDER: ICD-10-CM

## 2024-08-09 DIAGNOSIS — R00.0 TACHYCARDIA: ICD-10-CM

## 2024-08-09 DIAGNOSIS — M54.16 LUMBAR RADICULOPATHY: ICD-10-CM

## 2024-08-09 DIAGNOSIS — E78.5 HYPERLIPIDEMIA, UNSPECIFIED HYPERLIPIDEMIA TYPE: ICD-10-CM

## 2024-08-09 PROCEDURE — 99214 OFFICE O/P EST MOD 30 MIN: CPT | Performed by: FAMILY MEDICINE

## 2024-08-09 RX ORDER — PROPRANOLOL HYDROCHLORIDE 20 MG/1
20 TABLET ORAL EVERY 12 HOURS
Qty: 60 TABLET | Refills: 2 | Status: SHIPPED | OUTPATIENT
Start: 2024-08-09

## 2024-08-09 RX ORDER — METHOCARBAMOL 500 MG/1
500 TABLET, FILM COATED ORAL 2 TIMES DAILY
Qty: 60 TABLET | Refills: 0 | Status: SHIPPED | OUTPATIENT
Start: 2024-08-09

## 2024-08-09 NOTE — PROGRESS NOTES
Dolly Barrios 2000 female MRN: [id number]    [facility] PRACTICE OFFICE VISIT  [facility] - [facility]      ASSESSMENT/PLAN  Dolly Barrios is a 24 y.o. female presents to the office for    Diagnoses and all orders for this visit:    Essential hypertension  -     CBC and differential; Future  -     Comprehensive metabolic panel; Future  -     propranolol (INDERAL) 20 mg tablet; Take 1 tablet (20 mg total) by mouth every 12 (twelve) hours    Lumbar radiculopathy  -     methocarbamol (ROBAXIN) 500 mg tablet; Take 1 tablet (500 mg total) by mouth 2 (two) times a day    Hyperlipidemia, unspecified hyperlipidemia type  -     Lipid panel; Future    Screening for thyroid disorder  -     TSH, 3rd generation with Free T4 reflex; Future    Tachycardia  -     propranolol (INDERAL) 20 mg tablet; Take 1 tablet (20 mg total) by mouth every 12 (twelve) hours       Currently the patient is not taking anything for her blood pressure and her tachycardia blood pressure is on the higher range of normal recommend patient restarting propranolol 20 mg every 12 hours.  Repeat all screening labs given.  Lumbar radiculopathy continue on Robaxin 500 mg as needed.  Refilled today.  She had this in the past and stopped taking it for some time.  [place] disability paperwork placed today for temporary disability placard           No future appointments.       SUBJECTIVE  CC: Follow-up      HPI:  Dolly Barrios is a 24 y.o. female who presents for an acute appointment.  Patient is requesting a handicap placard for [place] states that she had this for [place] by the specialist.  Patient states overall she has been doing okay has not taken any of her medications.  Patient states that her blood pressure has been in the range of normal when monitoring it at home.  She continues to have arthritic pains which is requires her to have the disability placard.  Patient states that she would like a refill on the  "Robaxin that she used to take 2 years ago  Review of Systems   Constitutional:  Negative for activity change, appetite change, chills, fatigue and fever.   HENT:  Negative for congestion.    Respiratory:  Negative for cough, chest tightness and shortness of breath.    Cardiovascular:  Negative for chest pain and leg swelling.   Gastrointestinal:  Negative for abdominal distention, abdominal pain, constipation, diarrhea, nausea and vomiting.   Musculoskeletal:  Positive for arthralgias.   All other systems reviewed and are negative.      Historical Information   The patient history was reviewed as follows:  Past Medical History:   Diagnosis Date   • Anemia     with pregnancy , receive iron infusions   • Anxiety    • Chronic hip pain, left    • Chronic hypertension affecting pregnancy 2022   • Chronic hypertension with superimposed preeclampsia 6/3/2021   • Complication of anesthesia     pt reports required\" more anesthesia \"for her oral surgery and hand surgery to be effective   • Congenital pes planus of both feet     Resolved     • COVID-19     with pregnancy around 2020   • Difficulty walking     Resolved 2017 , secondary to hip left   • Eczema    • Hypertension     chronic HTN    • Mass of hand, left     Resolved 2017 , benign    • Obesity 2016   • Pain     chronic back pain   •  (spontaneous vaginal delivery) 2021   •  (spontaneous vaginal delivery) 2/15/2022   • Varicella     vaccine   • Visual impairment     eyewear         Medications:     Current Outpatient Medications:   •  methocarbamol (ROBAXIN) 500 mg tablet, Take 1 tablet (500 mg total) by mouth 2 (two) times a day, Disp: 60 tablet, Rfl: 0  •  propranolol (INDERAL) 20 mg tablet, Take 1 tablet (20 mg total) by mouth every 12 (twelve) hours, Disp: 60 tablet, Rfl: 2  •  loratadine (CLARITIN) 10 mg tablet, Take 10 mg by mouth daily, Disp: , Rfl:   •  multivitamin (THERAGRAN) TABS, Take 1 tablet by mouth daily, " "Disp: , Rfl:     No Known Allergies    OBJECTIVE  Vitals:   Vitals:    08/09/24 0814   BP: 140/80   BP Location: Left arm   Patient Position: Sitting   Cuff Size: Standard   Pulse: 104   Resp: 12   Temp: 97.6 °F (36.4 °C)   TempSrc: Temporal   SpO2: 99%   Weight: (!) 161 kg (354 lb)   Height: 5' 10\" (1.778 m)         Physical Exam  Vitals reviewed.   Constitutional:       Appearance: She is well-developed.   HENT:      Head: Normocephalic and atraumatic.   Eyes:      Extraocular Movements: EOM normal.      Conjunctiva/sclera: Conjunctivae normal.      Pupils: Pupils are equal, round, and reactive to light.   Cardiovascular:      Rate and Rhythm: Normal rate and regular rhythm.      Heart sounds: Normal heart sounds, S1 normal and S2 normal. No murmur heard.  Pulmonary:      Effort: Pulmonary effort is normal. No respiratory distress.      Breath sounds: Normal breath sounds. No wheezing.   Musculoskeletal:         General: No edema. Normal range of motion.      Cervical back: Normal range of motion and neck supple.   Skin:     General: Skin is warm.   Neurological:      Mental Status: She is alert and oriented to person, place, and time.   Psychiatric:         Mood and Affect: Mood and affect normal.         Speech: Speech normal.         Behavior: Behavior normal.         Thought Content: Thought content normal.         Judgment: Judgment normal.                    Ariadna Lynn MD,   [facility]  8/9/2024      "

## 2024-08-11 DIAGNOSIS — F41.9 ANXIETY: ICD-10-CM

## 2024-08-12 RX ORDER — DULOXETIN HYDROCHLORIDE 60 MG/1
60 CAPSULE, DELAYED RELEASE ORAL DAILY
Qty: 60 CAPSULE | Refills: 0 | Status: SHIPPED | OUTPATIENT
Start: 2024-08-12

## 2024-10-09 DIAGNOSIS — R00.0 TACHYCARDIA: ICD-10-CM

## 2024-10-09 DIAGNOSIS — I10 ESSENTIAL HYPERTENSION: ICD-10-CM

## 2024-10-10 RX ORDER — PROPRANOLOL HCL 20 MG
20 TABLET ORAL EVERY 12 HOURS
Qty: 180 TABLET | Refills: 1 | Status: SHIPPED | OUTPATIENT
Start: 2024-10-10

## (undated) DEVICE — PORTAL SKID

## (undated) DEVICE — CHLORAPREP HI-LITE 26ML ORANGE

## (undated) DEVICE — ACE WRAP 6 IN STERILE

## (undated) DEVICE — FABRIC REINFORCED, SURGICAL GOWN, XL: Brand: CONVERTORS

## (undated) DEVICE — ANTI-EMBOLISM STOCKINGS,KNEE LENGTH,X-LARGE,LONG,SIZE H-: Brand: T.E.D.

## (undated) DEVICE — ABDOMINAL PAD: Brand: DERMACEA

## (undated) DEVICE — TIBURON EXTREMITY SHEET: Brand: CONVERTORS

## (undated) DEVICE — CURITY NON-ADHERENT STRIPS: Brand: CURITY

## (undated) DEVICE — TENSIONER/CUTTER SUT F/2-0 AND 0 FIBERWIRE SUT

## (undated) DEVICE — TUBING ARTHROSCOPIC WAVE  MAIN PUMP

## (undated) DEVICE — GLOVE INDICATOR PI UNDERGLOVE SZ 8 BLUE

## (undated) DEVICE — U-DRAPE: Brand: CONVERTORS

## (undated) DEVICE — HANDPIECE POWERPICK 1.5MM 13CM 30DEG COOLCUT

## (undated) DEVICE — PADDING CAST 6IN COTTON STRL

## (undated) DEVICE — 3M™ STERI-DRAPE™ U-DRAPE 1015: Brand: STERI-DRAPE™

## (undated) DEVICE — TOWEL SURG XR DETECT GREEN STRL RFD

## (undated) DEVICE — 3M™ STERI-STRIP™ REINFORCED ADHESIVE SKIN CLOSURES, R1547, 1/2 IN X 4 IN (12 MM X 100 MM), 6 STRIPS/ENVELOPE: Brand: 3M™ STERI-STRIP™

## (undated) DEVICE — BANDAGE, ESMARK LF STR 6"X9' (20/CS): Brand: CYPRESS

## (undated) DEVICE — STRAP LITHOTOMY CANDY CANE

## (undated) DEVICE — ASTOUND SURGICAL GOWN, XXX LARGE, X-LONG: Brand: CONVERTORS

## (undated) DEVICE — INTENDED FOR TISSUE SEPARATION, AND OTHER PROCEDURES THAT REQUIRE A SHARP SURGICAL BLADE TO PUNCTURE OR CUT.: Brand: BARD-PARKER ® CARBON RIB-BACK BLADES

## (undated) DEVICE — SPONGE GAUZE 4 X 9

## (undated) DEVICE — GLOVE INDICATOR PI UNDERGLOVE SZ 6.5 BLUE

## (undated) DEVICE — PACK ARTHROSCOPY

## (undated) DEVICE — GLOVE SRG BIOGEL 6.5

## (undated) DEVICE — BLADE SHAVER TORPEDO 4MM 13CM  COOLCUT

## (undated) DEVICE — 3M™ IOBAN™ 2 ANTIMICROBIAL INCISE DRAPE 6640EZ: Brand: IOBAN™ 2

## (undated) DEVICE — GLOVE SRG BIOGEL 8